# Patient Record
Sex: MALE | Race: WHITE | Employment: UNEMPLOYED | URBAN - METROPOLITAN AREA
[De-identification: names, ages, dates, MRNs, and addresses within clinical notes are randomized per-mention and may not be internally consistent; named-entity substitution may affect disease eponyms.]

---

## 2018-04-19 ENCOUNTER — HOSPITAL ENCOUNTER (OUTPATIENT)
Age: 53
Discharge: HOME | End: 2018-04-21
Payer: MEDICARE

## 2018-04-19 LAB
ALBUMIN SERPL-MCNC: 4.4 G/DL (ref 3.5–5.2)
ALP BLD-CCNC: 88 U/L (ref 40–129)
ALT SERPL-CCNC: 15 U/L (ref 0–40)
ANION GAP SERPL CALCULATED.3IONS-SCNC: 17 MMOL/L (ref 7–16)
AST SERPL-CCNC: 14 U/L (ref 0–39)
BASOPHILS ABSOLUTE: 0.11 E9/L (ref 0–0.2)
BASOPHILS RELATIVE PERCENT: 1.1 % (ref 0–2)
BILIRUB SERPL-MCNC: 1.2 MG/DL (ref 0–1.2)
BUN BLDV-MCNC: 15 MG/DL (ref 6–20)
CALCIUM SERPL-MCNC: 9.6 MG/DL (ref 8.6–10.2)
CHLORIDE BLD-SCNC: 98 MMOL/L (ref 98–107)
CHOLESTEROL, TOTAL: 143 MG/DL (ref 0–199)
CO2: 24 MMOL/L (ref 22–29)
CREAT SERPL-MCNC: 1.1 MG/DL (ref 0.7–1.2)
EOSINOPHILS ABSOLUTE: 0.45 E9/L (ref 0.05–0.5)
EOSINOPHILS RELATIVE PERCENT: 4.4 % (ref 0–6)
GFR AFRICAN AMERICAN: >60
GFR NON-AFRICAN AMERICAN: >60 ML/MIN/1.73
GLUCOSE BLD-MCNC: 102 MG/DL (ref 74–109)
HCT VFR BLD CALC: 47.9 % (ref 37–54)
HDLC SERPL-MCNC: 30 MG/DL
HEMOGLOBIN: 15.7 G/DL (ref 12.5–16.5)
IMMATURE GRANULOCYTES #: 0.02 E9/L
IMMATURE GRANULOCYTES %: 0.2 % (ref 0–5)
LDL CHOLESTEROL CALCULATED: 83 MG/DL (ref 0–99)
LYMPHOCYTES ABSOLUTE: 1.79 E9/L (ref 1.5–4)
LYMPHOCYTES RELATIVE PERCENT: 17.7 % (ref 20–42)
MCH RBC QN AUTO: 31.3 PG (ref 26–35)
MCHC RBC AUTO-ENTMCNC: 32.8 % (ref 32–34.5)
MCV RBC AUTO: 95.6 FL (ref 80–99.9)
MONOCYTES ABSOLUTE: 0.78 E9/L (ref 0.1–0.95)
MONOCYTES RELATIVE PERCENT: 7.7 % (ref 2–12)
NEUTROPHILS ABSOLUTE: 6.99 E9/L (ref 1.8–7.3)
NEUTROPHILS RELATIVE PERCENT: 68.9 % (ref 43–80)
PDW BLD-RTO: 12.8 FL (ref 11.5–15)
PLATELET # BLD: 330 E9/L (ref 130–450)
PMV BLD AUTO: 10.3 FL (ref 7–12)
POTASSIUM SERPL-SCNC: 4 MMOL/L (ref 3.5–5)
PROSTATE SPECIFIC ANTIGEN: 0.17 NG/ML (ref 0–4)
RBC # BLD: 5.01 E12/L (ref 3.8–5.8)
SODIUM BLD-SCNC: 139 MMOL/L (ref 132–146)
TOTAL PROTEIN: 7.6 G/DL (ref 6.4–8.3)
TRIGL SERPL-MCNC: 148 MG/DL (ref 0–149)
TSH SERPL DL<=0.05 MIU/L-ACNC: 6.31 UIU/ML (ref 0.27–4.2)
VITAMIN D 25-HYDROXY: 40 NG/ML (ref 30–100)
VLDLC SERPL CALC-MCNC: 30 MG/DL
WBC # BLD: 10.1 E9/L (ref 4.5–11.5)

## 2018-04-19 PROCEDURE — 84443 ASSAY THYROID STIM HORMONE: CPT

## 2018-04-19 PROCEDURE — 82306 VITAMIN D 25 HYDROXY: CPT

## 2018-04-19 PROCEDURE — 80053 COMPREHEN METABOLIC PANEL: CPT

## 2018-04-19 PROCEDURE — 80061 LIPID PANEL: CPT

## 2018-04-19 PROCEDURE — G0103 PSA SCREENING: HCPCS

## 2018-04-19 PROCEDURE — 85025 COMPLETE CBC W/AUTO DIFF WBC: CPT

## 2018-07-18 ENCOUNTER — HOSPITAL ENCOUNTER (OUTPATIENT)
Age: 53
Discharge: HOME | End: 2018-07-20
Payer: MEDICARE

## 2018-07-18 LAB
ALBUMIN SERPL-MCNC: 4.1 G/DL (ref 3.5–5.2)
ALP BLD-CCNC: 101 U/L (ref 40–129)
ALT SERPL-CCNC: 16 U/L (ref 0–40)
ANION GAP SERPL CALCULATED.3IONS-SCNC: 18 MMOL/L (ref 7–16)
AST SERPL-CCNC: 13 U/L (ref 0–39)
BASOPHILS ABSOLUTE: 0.06 E9/L (ref 0–0.2)
BASOPHILS RELATIVE PERCENT: 0.7 % (ref 0–2)
BILIRUB SERPL-MCNC: 1.1 MG/DL (ref 0–1.2)
BUN BLDV-MCNC: 11 MG/DL (ref 6–20)
CALCIUM SERPL-MCNC: 9.6 MG/DL (ref 8.6–10.2)
CHLORIDE BLD-SCNC: 99 MMOL/L (ref 98–107)
CHOLESTEROL, TOTAL: 132 MG/DL (ref 0–199)
CO2: 22 MMOL/L (ref 22–29)
CREAT SERPL-MCNC: 0.9 MG/DL (ref 0.7–1.2)
EOSINOPHILS ABSOLUTE: 0.18 E9/L (ref 0.05–0.5)
EOSINOPHILS RELATIVE PERCENT: 2 % (ref 0–6)
GFR AFRICAN AMERICAN: >60
GFR NON-AFRICAN AMERICAN: >60 ML/MIN/1.73
GLUCOSE BLD-MCNC: 94 MG/DL (ref 74–109)
HCT VFR BLD CALC: 48.1 % (ref 37–54)
HDLC SERPL-MCNC: 29 MG/DL
HEMOGLOBIN: 15.8 G/DL (ref 12.5–16.5)
IMMATURE GRANULOCYTES #: 0.03 E9/L
IMMATURE GRANULOCYTES %: 0.3 % (ref 0–5)
LDL CHOLESTEROL CALCULATED: 78 MG/DL (ref 0–99)
LYMPHOCYTES ABSOLUTE: 1.08 E9/L (ref 1.5–4)
LYMPHOCYTES RELATIVE PERCENT: 12.1 % (ref 20–42)
MCH RBC QN AUTO: 31.3 PG (ref 26–35)
MCHC RBC AUTO-ENTMCNC: 32.8 % (ref 32–34.5)
MCV RBC AUTO: 95.4 FL (ref 80–99.9)
MONOCYTES ABSOLUTE: 0.55 E9/L (ref 0.1–0.95)
MONOCYTES RELATIVE PERCENT: 6.2 % (ref 2–12)
NEUTROPHILS ABSOLUTE: 6.99 E9/L (ref 1.8–7.3)
NEUTROPHILS RELATIVE PERCENT: 78.7 % (ref 43–80)
PDW BLD-RTO: 12.8 FL (ref 11.5–15)
PLATELET # BLD: 317 E9/L (ref 130–450)
PMV BLD AUTO: 10.1 FL (ref 7–12)
POTASSIUM SERPL-SCNC: 4.2 MMOL/L (ref 3.5–5)
RBC # BLD: 5.04 E12/L (ref 3.8–5.8)
SODIUM BLD-SCNC: 139 MMOL/L (ref 132–146)
TOTAL PROTEIN: 7.6 G/DL (ref 6.4–8.3)
TRIGL SERPL-MCNC: 126 MG/DL (ref 0–149)
TSH SERPL DL<=0.05 MIU/L-ACNC: 0.45 UIU/ML (ref 0.27–4.2)
VITAMIN D 25-HYDROXY: 37 NG/ML (ref 30–100)
VLDLC SERPL CALC-MCNC: 25 MG/DL
WBC # BLD: 8.9 E9/L (ref 4.5–11.5)

## 2018-07-18 PROCEDURE — 80061 LIPID PANEL: CPT

## 2018-07-18 PROCEDURE — 82306 VITAMIN D 25 HYDROXY: CPT

## 2018-07-18 PROCEDURE — 85025 COMPLETE CBC W/AUTO DIFF WBC: CPT

## 2018-07-18 PROCEDURE — 84443 ASSAY THYROID STIM HORMONE: CPT

## 2018-07-18 PROCEDURE — 80053 COMPREHEN METABOLIC PANEL: CPT

## 2018-11-14 ENCOUNTER — HOSPITAL ENCOUNTER (OUTPATIENT)
Age: 53
Discharge: HOME | End: 2018-11-16
Payer: MEDICARE

## 2018-11-14 LAB
ALBUMIN SERPL-MCNC: 4.3 G/DL (ref 3.5–5.2)
ALP BLD-CCNC: 97 U/L (ref 40–129)
ALT SERPL-CCNC: 18 U/L (ref 0–40)
ANION GAP SERPL CALCULATED.3IONS-SCNC: 20 MMOL/L (ref 7–16)
AST SERPL-CCNC: 15 U/L (ref 0–39)
BASOPHILS ABSOLUTE: 0.08 E9/L (ref 0–0.2)
BASOPHILS RELATIVE PERCENT: 1 % (ref 0–2)
BILIRUB SERPL-MCNC: 0.4 MG/DL (ref 0–1.2)
BUN BLDV-MCNC: 10 MG/DL (ref 6–20)
CALCIUM SERPL-MCNC: 9.6 MG/DL (ref 8.6–10.2)
CHLORIDE BLD-SCNC: 102 MMOL/L (ref 98–107)
CHOLESTEROL, TOTAL: 148 MG/DL (ref 0–199)
CO2: 21 MMOL/L (ref 22–29)
CREAT SERPL-MCNC: 1.1 MG/DL (ref 0.7–1.2)
EOSINOPHILS ABSOLUTE: 0.2 E9/L (ref 0.05–0.5)
EOSINOPHILS RELATIVE PERCENT: 2.5 % (ref 0–6)
GFR AFRICAN AMERICAN: >60
GFR NON-AFRICAN AMERICAN: >60 ML/MIN/1.73
GLUCOSE BLD-MCNC: 103 MG/DL (ref 74–99)
HBA1C MFR BLD: 5.5 % (ref 4–5.6)
HCT VFR BLD CALC: 51 % (ref 37–54)
HDLC SERPL-MCNC: 32 MG/DL
HEMOGLOBIN: 17.1 G/DL (ref 12.5–16.5)
IMMATURE GRANULOCYTES #: 0.03 E9/L
IMMATURE GRANULOCYTES %: 0.4 % (ref 0–5)
LDL CHOLESTEROL CALCULATED: 79 MG/DL (ref 0–99)
LYMPHOCYTES ABSOLUTE: 1.22 E9/L (ref 1.5–4)
LYMPHOCYTES RELATIVE PERCENT: 15.3 % (ref 20–42)
MCH RBC QN AUTO: 31.7 PG (ref 26–35)
MCHC RBC AUTO-ENTMCNC: 33.5 % (ref 32–34.5)
MCV RBC AUTO: 94.4 FL (ref 80–99.9)
MONOCYTES ABSOLUTE: 0.5 E9/L (ref 0.1–0.95)
MONOCYTES RELATIVE PERCENT: 6.3 % (ref 2–12)
NEUTROPHILS ABSOLUTE: 5.92 E9/L (ref 1.8–7.3)
NEUTROPHILS RELATIVE PERCENT: 74.5 % (ref 43–80)
PDW BLD-RTO: 13.1 FL (ref 11.5–15)
PLATELET # BLD: 325 E9/L (ref 130–450)
PMV BLD AUTO: 10.4 FL (ref 7–12)
POTASSIUM SERPL-SCNC: 4.1 MMOL/L (ref 3.5–5)
RBC # BLD: 5.4 E12/L (ref 3.8–5.8)
SODIUM BLD-SCNC: 143 MMOL/L (ref 132–146)
TOTAL PROTEIN: 7.6 G/DL (ref 6.4–8.3)
TRIGL SERPL-MCNC: 187 MG/DL (ref 0–149)
TSH SERPL DL<=0.05 MIU/L-ACNC: 0.79 UIU/ML (ref 0.27–4.2)
VITAMIN D 25-HYDROXY: 38 NG/ML (ref 30–100)
VLDLC SERPL CALC-MCNC: 37 MG/DL
WBC # BLD: 8 E9/L (ref 4.5–11.5)

## 2018-11-14 PROCEDURE — 85025 COMPLETE CBC W/AUTO DIFF WBC: CPT

## 2018-11-14 PROCEDURE — 82306 VITAMIN D 25 HYDROXY: CPT

## 2018-11-14 PROCEDURE — 80061 LIPID PANEL: CPT

## 2018-11-14 PROCEDURE — 84443 ASSAY THYROID STIM HORMONE: CPT

## 2018-11-14 PROCEDURE — 80053 COMPREHEN METABOLIC PANEL: CPT

## 2018-11-14 PROCEDURE — 83036 HEMOGLOBIN GLYCOSYLATED A1C: CPT

## 2018-12-10 ENCOUNTER — HOSPITAL ENCOUNTER (EMERGENCY)
Age: 53
Discharge: HOME | End: 2018-12-10
Attending: EMERGENCY MEDICINE
Payer: MEDICARE

## 2018-12-10 ENCOUNTER — APPOINTMENT (OUTPATIENT)
Dept: GENERAL RADIOLOGY | Age: 53
End: 2018-12-10
Payer: MEDICARE

## 2018-12-10 VITALS
BODY MASS INDEX: 44.1 KG/M2 | RESPIRATION RATE: 22 BRPM | WEIGHT: 315 LBS | HEART RATE: 99 BPM | DIASTOLIC BLOOD PRESSURE: 68 MMHG | HEIGHT: 71 IN | OXYGEN SATURATION: 98 % | SYSTOLIC BLOOD PRESSURE: 140 MMHG | TEMPERATURE: 98 F

## 2018-12-10 DIAGNOSIS — J11.1 INFLUENZA WITH RESPIRATORY MANIFESTATION OTHER THAN PNEUMONIA: Primary | ICD-10-CM

## 2018-12-10 DIAGNOSIS — R09.1 PLEURISY: ICD-10-CM

## 2018-12-10 LAB
ALBUMIN SERPL-MCNC: 4.3 G/DL (ref 3.5–5.2)
ALP BLD-CCNC: 92 U/L (ref 40–129)
ALT SERPL-CCNC: 18 U/L (ref 0–40)
ANION GAP SERPL CALCULATED.3IONS-SCNC: 15 MMOL/L (ref 7–16)
AST SERPL-CCNC: 14 U/L (ref 0–39)
BASOPHILS ABSOLUTE: 0.04 E9/L (ref 0–0.2)
BASOPHILS RELATIVE PERCENT: 0.4 % (ref 0–2)
BILIRUB SERPL-MCNC: 0.8 MG/DL (ref 0–1.2)
BUN BLDV-MCNC: 13 MG/DL (ref 6–20)
CALCIUM SERPL-MCNC: 9.2 MG/DL (ref 8.6–10.2)
CHLORIDE BLD-SCNC: 104 MMOL/L (ref 98–107)
CO2: 22 MMOL/L (ref 22–29)
CREAT SERPL-MCNC: 1.1 MG/DL (ref 0.7–1.2)
D DIMER: <200 NG/ML DDU
EKG ATRIAL RATE: 101 BPM
EKG P AXIS: 56 DEGREES
EKG P-R INTERVAL: 168 MS
EKG Q-T INTERVAL: 360 MS
EKG QRS DURATION: 100 MS
EKG QTC CALCULATION (BAZETT): 466 MS
EKG R AXIS: 52 DEGREES
EKG T AXIS: 50 DEGREES
EKG VENTRICULAR RATE: 101 BPM
EOSINOPHILS ABSOLUTE: 0.04 E9/L (ref 0.05–0.5)
EOSINOPHILS RELATIVE PERCENT: 0.4 % (ref 0–6)
GFR AFRICAN AMERICAN: >60
GFR NON-AFRICAN AMERICAN: >60 ML/MIN/1.73
GLUCOSE BLD-MCNC: 154 MG/DL (ref 74–99)
HCT VFR BLD CALC: 46.3 % (ref 37–54)
HEMOGLOBIN: 16 G/DL (ref 12.5–16.5)
IMMATURE GRANULOCYTES #: 0.04 E9/L
IMMATURE GRANULOCYTES %: 0.4 % (ref 0–5)
INFLUENZA A BY PCR: DETECTED
INFLUENZA B BY PCR: NOT DETECTED
LYMPHOCYTES ABSOLUTE: 0.24 E9/L (ref 1.5–4)
LYMPHOCYTES RELATIVE PERCENT: 2.2 % (ref 20–42)
MCH RBC QN AUTO: 31.6 PG (ref 26–35)
MCHC RBC AUTO-ENTMCNC: 34.6 % (ref 32–34.5)
MCV RBC AUTO: 91.5 FL (ref 80–99.9)
MONOCYTES ABSOLUTE: 0.41 E9/L (ref 0.1–0.95)
MONOCYTES RELATIVE PERCENT: 3.7 % (ref 2–12)
NEUTROPHILS ABSOLUTE: 10.36 E9/L (ref 1.8–7.3)
NEUTROPHILS RELATIVE PERCENT: 92.9 % (ref 43–80)
PDW BLD-RTO: 12.5 FL (ref 11.5–15)
PLATELET # BLD: 275 E9/L (ref 130–450)
PMV BLD AUTO: 9.6 FL (ref 7–12)
POTASSIUM REFLEX MAGNESIUM: 3.6 MMOL/L (ref 3.5–5)
RBC # BLD: 5.06 E12/L (ref 3.8–5.8)
SODIUM BLD-SCNC: 141 MMOL/L (ref 132–146)
TOTAL PROTEIN: 7.5 G/DL (ref 6.4–8.3)
TROPONIN: <0.01 NG/ML (ref 0–0.03)
WBC # BLD: 11.1 E9/L (ref 4.5–11.5)

## 2018-12-10 PROCEDURE — 84484 ASSAY OF TROPONIN QUANT: CPT

## 2018-12-10 PROCEDURE — 80053 COMPREHEN METABOLIC PANEL: CPT

## 2018-12-10 PROCEDURE — 71045 X-RAY EXAM CHEST 1 VIEW: CPT

## 2018-12-10 PROCEDURE — 6360000002 HC RX W HCPCS: Performed by: EMERGENCY MEDICINE

## 2018-12-10 PROCEDURE — 87502 INFLUENZA DNA AMP PROBE: CPT

## 2018-12-10 PROCEDURE — 93005 ELECTROCARDIOGRAM TRACING: CPT | Performed by: EMERGENCY MEDICINE

## 2018-12-10 PROCEDURE — 6370000000 HC RX 637 (ALT 250 FOR IP): Performed by: EMERGENCY MEDICINE

## 2018-12-10 PROCEDURE — 85025 COMPLETE CBC W/AUTO DIFF WBC: CPT

## 2018-12-10 PROCEDURE — 99285 EMERGENCY DEPT VISIT HI MDM: CPT

## 2018-12-10 PROCEDURE — 85378 FIBRIN DEGRADE SEMIQUANT: CPT

## 2018-12-10 PROCEDURE — 96375 TX/PRO/DX INJ NEW DRUG ADDON: CPT

## 2018-12-10 PROCEDURE — 36415 COLL VENOUS BLD VENIPUNCTURE: CPT

## 2018-12-10 PROCEDURE — 96374 THER/PROPH/DIAG INJ IV PUSH: CPT

## 2018-12-10 RX ORDER — IPRATROPIUM BROMIDE AND ALBUTEROL SULFATE 2.5; .5 MG/3ML; MG/3ML
1 SOLUTION RESPIRATORY (INHALATION)
Status: DISCONTINUED | OUTPATIENT
Start: 2018-12-10 | End: 2018-12-10 | Stop reason: HOSPADM

## 2018-12-10 RX ORDER — METHYLPREDNISOLONE SODIUM SUCCINATE 125 MG/2ML
125 INJECTION, POWDER, LYOPHILIZED, FOR SOLUTION INTRAMUSCULAR; INTRAVENOUS ONCE
Status: COMPLETED | OUTPATIENT
Start: 2018-12-10 | End: 2018-12-10

## 2018-12-10 RX ORDER — OSELTAMIVIR PHOSPHATE 75 MG/1
75 CAPSULE ORAL 2 TIMES DAILY
Qty: 10 CAPSULE | Refills: 0 | Status: SHIPPED | OUTPATIENT
Start: 2018-12-10 | End: 2018-12-15

## 2018-12-10 RX ORDER — OSELTAMIVIR PHOSPHATE 75 MG/1
75 CAPSULE ORAL ONCE
Status: DISCONTINUED | OUTPATIENT
Start: 2018-12-10 | End: 2018-12-10 | Stop reason: HOSPADM

## 2018-12-10 RX ORDER — KETOROLAC TROMETHAMINE 30 MG/ML
30 INJECTION, SOLUTION INTRAMUSCULAR; INTRAVENOUS ONCE
Status: COMPLETED | OUTPATIENT
Start: 2018-12-10 | End: 2018-12-10

## 2018-12-10 RX ADMIN — METHYLPREDNISOLONE SODIUM SUCCINATE 125 MG: 125 INJECTION, POWDER, FOR SOLUTION INTRAMUSCULAR; INTRAVENOUS at 08:11

## 2018-12-10 RX ADMIN — KETOROLAC TROMETHAMINE 30 MG: 30 INJECTION, SOLUTION INTRAMUSCULAR at 07:40

## 2018-12-10 RX ADMIN — IPRATROPIUM BROMIDE AND ALBUTEROL SULFATE 1 AMPULE: .5; 3 SOLUTION RESPIRATORY (INHALATION) at 07:01

## 2018-12-10 ASSESSMENT — PAIN SCALES - GENERAL
PAINLEVEL_OUTOF10: 10
PAINLEVEL_OUTOF10: 7

## 2018-12-10 ASSESSMENT — PAIN DESCRIPTION - ORIENTATION: ORIENTATION: RIGHT

## 2018-12-10 ASSESSMENT — PAIN DESCRIPTION - LOCATION: LOCATION: CHEST

## 2018-12-10 ASSESSMENT — PAIN DESCRIPTION - FREQUENCY: FREQUENCY: CONTINUOUS

## 2018-12-10 ASSESSMENT — PAIN DESCRIPTION - PAIN TYPE: TYPE: ACUTE PAIN

## 2018-12-10 ASSESSMENT — PAIN DESCRIPTION - DESCRIPTORS: DESCRIPTORS: SHARP

## 2018-12-10 NOTE — ED PROVIDER NOTES
EXAM--------------------------------------    Constitutional:  Well developed, well nourished, no acute distress, non-toxic appearance   Eyes:  PERRL, conjunctiva normal, EOMI  HENT:  Atraumatic, external ears normal, nose normal, oropharynx moist. Neck- normal range of motion, no tenderness, supple   Respiratory:  No respiratory distress, decreased breath sounds, no rales, scattered wheezing   Cardiovascular:  Normal rate, normal rhythm, no murmurs, no gallops, no rubs. Radial and DP pulses 2+ bilaterally. GI:  Soft, nondistended, normal bowel sounds, nontender, no organomegaly, no mass, no rebound, no guarding   :  No costovertebral angle tenderness   Musculoskeletal:  No edema, no tenderness, no deformities. Back- no tenderness  Integument:  Well hydrated, no rash. Adequate perfusion. Lymphatic:  No lymphadenopathy noted   Neurologic:  Alert & oriented x 3, CN 2-12 normal, normal motor function, normal sensory function, no focal deficits noted. Psychiatric:  Speech and behavior appropriate       -------------------------------------------------- RESULTS -------------------------------------------------  I have personally reviewed all laboratory and imaging results for this patient. Results are listed below.      LABS:  Results for orders placed or performed during the hospital encounter of 12/10/18   Rapid influenza A/B antigens   Result Value Ref Range    Influenza A by PCR DETECTED (A) Not Detected    Influenza B by PCR Not Detected Not Detected   CBC Auto Differential   Result Value Ref Range    WBC 11.1 4.5 - 11.5 E9/L    RBC 5.06 3.80 - 5.80 E12/L    Hemoglobin 16.0 12.5 - 16.5 g/dL    Hematocrit 46.3 37.0 - 54.0 %    MCV 91.5 80.0 - 99.9 fL    MCH 31.6 26.0 - 35.0 pg    MCHC 34.6 (H) 32.0 - 34.5 %    RDW 12.5 11.5 - 15.0 fL    Platelets 811 485 - 651 E9/L    MPV 9.6 7.0 - 12.0 fL   Comprehensive Metabolic Panel w/ Reflex to MG   Result Value Ref Range    Sodium 141 132 - 146 mmol/L    Potassium

## 2018-12-16 ENCOUNTER — HOSPITAL ENCOUNTER (EMERGENCY)
Age: 53
Discharge: HOME | End: 2018-12-16
Attending: EMERGENCY MEDICINE
Payer: MEDICARE

## 2018-12-16 ENCOUNTER — APPOINTMENT (OUTPATIENT)
Dept: GENERAL RADIOLOGY | Age: 53
End: 2018-12-16
Payer: MEDICARE

## 2018-12-16 VITALS
OXYGEN SATURATION: 95 % | RESPIRATION RATE: 18 BRPM | HEIGHT: 71 IN | TEMPERATURE: 98.1 F | WEIGHT: 315 LBS | DIASTOLIC BLOOD PRESSURE: 75 MMHG | HEART RATE: 68 BPM | BODY MASS INDEX: 44.1 KG/M2 | SYSTOLIC BLOOD PRESSURE: 160 MMHG

## 2018-12-16 DIAGNOSIS — J45.20 MILD INTERMITTENT ASTHMATIC BRONCHITIS WITHOUT COMPLICATION: Primary | ICD-10-CM

## 2018-12-16 PROCEDURE — 71046 X-RAY EXAM CHEST 2 VIEWS: CPT

## 2018-12-16 PROCEDURE — 6370000000 HC RX 637 (ALT 250 FOR IP): Performed by: EMERGENCY MEDICINE

## 2018-12-16 PROCEDURE — 93005 ELECTROCARDIOGRAM TRACING: CPT

## 2018-12-16 PROCEDURE — 99285 EMERGENCY DEPT VISIT HI MDM: CPT

## 2018-12-16 RX ORDER — PREDNISONE 10 MG/1
TABLET ORAL
Qty: 10 TABLET | Refills: 0 | Status: SHIPPED | OUTPATIENT
Start: 2018-12-16 | End: 2018-12-26

## 2018-12-16 RX ORDER — PREDNISONE 20 MG/1
60 TABLET ORAL ONCE
Status: COMPLETED | OUTPATIENT
Start: 2018-12-16 | End: 2018-12-16

## 2018-12-16 RX ORDER — IPRATROPIUM BROMIDE AND ALBUTEROL SULFATE 2.5; .5 MG/3ML; MG/3ML
1 SOLUTION RESPIRATORY (INHALATION) ONCE
Status: COMPLETED | OUTPATIENT
Start: 2018-12-16 | End: 2018-12-16

## 2018-12-16 RX ORDER — AZITHROMYCIN 250 MG/1
TABLET, FILM COATED ORAL
Qty: 1 PACKET | Refills: 0 | Status: SHIPPED | OUTPATIENT
Start: 2018-12-16 | End: 2018-12-26

## 2018-12-16 RX ADMIN — IPRATROPIUM BROMIDE AND ALBUTEROL SULFATE 1 AMPULE: .5; 3 SOLUTION RESPIRATORY (INHALATION) at 15:59

## 2018-12-16 RX ADMIN — PREDNISONE 60 MG: 20 TABLET ORAL at 15:58

## 2018-12-16 ASSESSMENT — PAIN DESCRIPTION - PROGRESSION: CLINICAL_PROGRESSION: GRADUALLY WORSENING

## 2018-12-16 ASSESSMENT — PAIN DESCRIPTION - ORIENTATION: ORIENTATION: RIGHT

## 2018-12-16 ASSESSMENT — PAIN DESCRIPTION - PAIN TYPE: TYPE: ACUTE PAIN

## 2018-12-16 ASSESSMENT — PAIN SCALES - GENERAL: PAINLEVEL_OUTOF10: 6

## 2018-12-16 ASSESSMENT — PAIN DESCRIPTION - DESCRIPTORS: DESCRIPTORS: DULL

## 2018-12-16 ASSESSMENT — PAIN DESCRIPTION - LOCATION: LOCATION: CHEST

## 2018-12-16 NOTE — ED PROVIDER NOTES
HPI:  12/16/18,   Time: 3:47 PM         Mandi Espitia is a 47 y.o. male presenting to the ED for shortness of breath and coughing, beginning one week ago. The complaint has been persistent, moderate in severity, and worsened by light exertion. The patient has been having left-sided chest pain for the last week and has since developed a moist productive cough and shortness of breath with wheezing. The patient is a smoker and he has been using a nebulizer at home. He denies fever or chills or further chest pain    ROS:   Pertinent positives and negatives are stated within HPI, all other systems reviewed and are negative.  --------------------------------------------- PAST HISTORY ---------------------------------------------  Past Medical History:  has a past medical history of Hyperlipidemia; Hypertension; MS (multiple sclerosis) ([facility] Multiple sclerosis ([facility] Spinal stenosis, lumbar; and Thyroid disease. Past Surgical History:  has a past surgical history that includes hernia repair and Tonsillectomy. Social History:  reports that he has been smoking. He has been smoking about 1.00 pack per day. He has never used smokeless tobacco. He reports that he drinks alcohol. He reports that he does not use drugs. Family History: family history is not on file. The patients home medications have been reviewed. Allergies: Bactrim [sulfamethoxazole-trimethoprim]; Penicillins; and Sulfa antibiotics    -------------------------------------------------- RESULTS -------------------------------------------------  All laboratory and radiology results have been personally reviewed by myself   LABS:  No results found for this visit on 12/16/18. RADIOLOGY:  Interpreted by Radiologist.  XR CHEST STANDARD (2 VW)    (Results Pending)       ------------------------- NURSING NOTES AND VITALS REVIEWED ---------------------------   The nursing notes within the ED encounter and vital signs as below have been reviewed.

## 2018-12-16 NOTE — ED NOTES
Pt co right sided chest pain & SOB.  Last seen here 1 week ago & dx with pleurisy     Yamilet Dexter RN  12/16/18 [id number]

## 2018-12-19 LAB
EKG ATRIAL RATE: 71 BPM
EKG P AXIS: 35 DEGREES
EKG P-R INTERVAL: 150 MS
EKG Q-T INTERVAL: 388 MS
EKG QRS DURATION: 100 MS
EKG QTC CALCULATION (BAZETT): 421 MS
EKG R AXIS: 48 DEGREES
EKG T AXIS: 39 DEGREES
EKG VENTRICULAR RATE: 71 BPM

## 2019-02-07 ENCOUNTER — HOSPITAL ENCOUNTER (OUTPATIENT)
Age: 54
Discharge: HOME | End: 2019-02-09
Payer: MEDICARE

## 2019-02-07 LAB
ALBUMIN SERPL-MCNC: 4.4 G/DL (ref 3.5–5.2)
ALP BLD-CCNC: 83 U/L (ref 40–129)
ALT SERPL-CCNC: 15 U/L (ref 0–40)
ANION GAP SERPL CALCULATED.3IONS-SCNC: 14 MMOL/L (ref 7–16)
AST SERPL-CCNC: 13 U/L (ref 0–39)
BASOPHILS ABSOLUTE: 0.08 E9/L (ref 0–0.2)
BASOPHILS RELATIVE PERCENT: 0.9 % (ref 0–2)
BILIRUB SERPL-MCNC: 0.7 MG/DL (ref 0–1.2)
BUN BLDV-MCNC: 15 MG/DL (ref 6–20)
CALCIUM SERPL-MCNC: 9.7 MG/DL (ref 8.6–10.2)
CHLORIDE BLD-SCNC: 104 MMOL/L (ref 98–107)
CHOLESTEROL, TOTAL: 147 MG/DL (ref 0–199)
CO2: 22 MMOL/L (ref 22–29)
CREAT SERPL-MCNC: 1.1 MG/DL (ref 0.7–1.2)
EOSINOPHILS ABSOLUTE: 0.11 E9/L (ref 0.05–0.5)
EOSINOPHILS RELATIVE PERCENT: 1.2 % (ref 0–6)
GFR AFRICAN AMERICAN: >60
GFR NON-AFRICAN AMERICAN: >60 ML/MIN/1.73
GLUCOSE BLD-MCNC: 111 MG/DL (ref 74–99)
HCT VFR BLD CALC: 44.2 % (ref 37–54)
HDLC SERPL-MCNC: 35 MG/DL
HEMOGLOBIN: 14.4 G/DL (ref 12.5–16.5)
IMMATURE GRANULOCYTES #: 0.02 E9/L
IMMATURE GRANULOCYTES %: 0.2 % (ref 0–5)
LDL CHOLESTEROL CALCULATED: 78 MG/DL (ref 0–99)
LYMPHOCYTES ABSOLUTE: 1.03 E9/L (ref 1.5–4)
LYMPHOCYTES RELATIVE PERCENT: 11.1 % (ref 20–42)
MCH RBC QN AUTO: 31.6 PG (ref 26–35)
MCHC RBC AUTO-ENTMCNC: 32.6 % (ref 32–34.5)
MCV RBC AUTO: 97.1 FL (ref 80–99.9)
MONOCYTES ABSOLUTE: 0.66 E9/L (ref 0.1–0.95)
MONOCYTES RELATIVE PERCENT: 7.1 % (ref 2–12)
NEUTROPHILS ABSOLUTE: 7.37 E9/L (ref 1.8–7.3)
NEUTROPHILS RELATIVE PERCENT: 79.5 % (ref 43–80)
PDW BLD-RTO: 14.6 FL (ref 11.5–15)
PLATELET # BLD: 325 E9/L (ref 130–450)
PMV BLD AUTO: 10 FL (ref 7–12)
POTASSIUM SERPL-SCNC: 4.3 MMOL/L (ref 3.5–5)
PROSTATE SPECIFIC ANTIGEN: 0.22 NG/ML (ref 0–4)
RBC # BLD: 4.55 E12/L (ref 3.8–5.8)
SODIUM BLD-SCNC: 140 MMOL/L (ref 132–146)
TOTAL PROTEIN: 7.5 G/DL (ref 6.4–8.3)
TRIGL SERPL-MCNC: 170 MG/DL (ref 0–149)
TSH SERPL DL<=0.05 MIU/L-ACNC: 15.15 UIU/ML (ref 0.27–4.2)
VITAMIN D 25-HYDROXY: 35 NG/ML (ref 30–100)
VLDLC SERPL CALC-MCNC: 34 MG/DL
WBC # BLD: 9.3 E9/L (ref 4.5–11.5)

## 2019-02-07 PROCEDURE — 80061 LIPID PANEL: CPT

## 2019-02-07 PROCEDURE — 84443 ASSAY THYROID STIM HORMONE: CPT

## 2019-02-07 PROCEDURE — 85025 COMPLETE CBC W/AUTO DIFF WBC: CPT

## 2019-02-07 PROCEDURE — 82306 VITAMIN D 25 HYDROXY: CPT

## 2019-02-07 PROCEDURE — 80053 COMPREHEN METABOLIC PANEL: CPT

## 2019-02-07 PROCEDURE — G0103 PSA SCREENING: HCPCS

## 2019-04-04 ENCOUNTER — HOSPITAL ENCOUNTER (OUTPATIENT)
Age: 54
Discharge: HOME | End: 2019-04-06
Payer: MEDICARE

## 2019-04-04 LAB
ALBUMIN SERPL-MCNC: 4.3 G/DL (ref 3.5–5.2)
ALP BLD-CCNC: 92 U/L (ref 40–129)
ALT SERPL-CCNC: 14 U/L (ref 0–40)
ANION GAP SERPL CALCULATED.3IONS-SCNC: 19 MMOL/L (ref 7–16)
AST SERPL-CCNC: 13 U/L (ref 0–39)
BASOPHILS ABSOLUTE: 0.06 E9/L (ref 0–0.2)
BASOPHILS RELATIVE PERCENT: 0.7 % (ref 0–2)
BILIRUB SERPL-MCNC: 0.4 MG/DL (ref 0–1.2)
BUN BLDV-MCNC: 10 MG/DL (ref 6–20)
CALCIUM SERPL-MCNC: 9.6 MG/DL (ref 8.6–10.2)
CHLORIDE BLD-SCNC: 99 MMOL/L (ref 98–107)
CHOLESTEROL, TOTAL: 140 MG/DL (ref 0–199)
CO2: 22 MMOL/L (ref 22–29)
CREAT SERPL-MCNC: 0.9 MG/DL (ref 0.7–1.2)
EOSINOPHILS ABSOLUTE: 0.21 E9/L (ref 0.05–0.5)
EOSINOPHILS RELATIVE PERCENT: 2.6 % (ref 0–6)
GFR AFRICAN AMERICAN: >60
GFR NON-AFRICAN AMERICAN: >60 ML/MIN/1.73
GLUCOSE BLD-MCNC: 110 MG/DL (ref 74–99)
HCT VFR BLD CALC: 48.6 % (ref 37–54)
HDLC SERPL-MCNC: 32 MG/DL
HEMOGLOBIN: 16.2 G/DL (ref 12.5–16.5)
IMMATURE GRANULOCYTES #: 0.01 E9/L
IMMATURE GRANULOCYTES %: 0.1 % (ref 0–5)
LDL CHOLESTEROL CALCULATED: 56 MG/DL (ref 0–99)
LYMPHOCYTES ABSOLUTE: 1.09 E9/L (ref 1.5–4)
LYMPHOCYTES RELATIVE PERCENT: 13.4 % (ref 20–42)
MCH RBC QN AUTO: 32.1 PG (ref 26–35)
MCHC RBC AUTO-ENTMCNC: 33.3 % (ref 32–34.5)
MCV RBC AUTO: 96.2 FL (ref 80–99.9)
MONOCYTES ABSOLUTE: 0.36 E9/L (ref 0.1–0.95)
MONOCYTES RELATIVE PERCENT: 4.4 % (ref 2–12)
NEUTROPHILS ABSOLUTE: 6.43 E9/L (ref 1.8–7.3)
NEUTROPHILS RELATIVE PERCENT: 78.8 % (ref 43–80)
PDW BLD-RTO: 12 FL (ref 11.5–15)
PLATELET # BLD: 363 E9/L (ref 130–450)
PMV BLD AUTO: 10.3 FL (ref 7–12)
POTASSIUM SERPL-SCNC: 3.7 MMOL/L (ref 3.5–5)
PROSTATE SPECIFIC ANTIGEN: 0.11 NG/ML (ref 0–4)
RBC # BLD: 5.05 E12/L (ref 3.8–5.8)
SODIUM BLD-SCNC: 140 MMOL/L (ref 132–146)
TOTAL PROTEIN: 7.5 G/DL (ref 6.4–8.3)
TRIGL SERPL-MCNC: 261 MG/DL (ref 0–149)
TSH SERPL DL<=0.05 MIU/L-ACNC: 1.59 UIU/ML (ref 0.27–4.2)
VITAMIN D 25-HYDROXY: 33 NG/ML (ref 30–100)
VLDLC SERPL CALC-MCNC: 52 MG/DL
WBC # BLD: 8.2 E9/L (ref 4.5–11.5)

## 2019-04-04 PROCEDURE — 80061 LIPID PANEL: CPT

## 2019-04-04 PROCEDURE — 85025 COMPLETE CBC W/AUTO DIFF WBC: CPT

## 2019-04-04 PROCEDURE — 80053 COMPREHEN METABOLIC PANEL: CPT

## 2019-04-04 PROCEDURE — 84443 ASSAY THYROID STIM HORMONE: CPT

## 2019-04-04 PROCEDURE — 82306 VITAMIN D 25 HYDROXY: CPT

## 2019-04-04 PROCEDURE — G0103 PSA SCREENING: HCPCS

## 2019-07-23 ENCOUNTER — HOSPITAL ENCOUNTER (EMERGENCY)
Age: 54
Discharge: HOME | End: 2019-07-23
Payer: MEDICARE

## 2019-07-23 VITALS
OXYGEN SATURATION: 96 % | SYSTOLIC BLOOD PRESSURE: 124 MMHG | DIASTOLIC BLOOD PRESSURE: 88 MMHG | HEART RATE: 87 BPM | RESPIRATION RATE: 16 BRPM | BODY MASS INDEX: 44.1 KG/M2 | TEMPERATURE: 97.9 F | WEIGHT: 315 LBS | HEIGHT: 71 IN

## 2019-07-23 DIAGNOSIS — B02.9 HERPES ZOSTER WITHOUT COMPLICATION: Primary | ICD-10-CM

## 2019-07-23 PROCEDURE — 99282 EMERGENCY DEPT VISIT SF MDM: CPT

## 2019-07-23 RX ORDER — ACYCLOVIR 800 MG/1
800 TABLET ORAL
Qty: 35 TABLET | Refills: 0 | Status: SHIPPED | OUTPATIENT
Start: 2019-07-23 | End: 2019-08-02

## 2019-07-23 RX ORDER — METHYLPREDNISOLONE 4 MG/1
TABLET ORAL
Qty: 1 KIT | Refills: 0 | Status: SHIPPED | OUTPATIENT
Start: 2019-07-23 | End: 2019-07-29

## 2019-07-23 RX ORDER — OXYCODONE AND ACETAMINOPHEN 10; 325 MG/1; MG/1
1 TABLET ORAL EVERY 4 HOURS PRN
COMMUNITY

## 2019-07-23 ASSESSMENT — PAIN DESCRIPTION - ORIENTATION: ORIENTATION: RIGHT

## 2019-07-23 ASSESSMENT — PAIN DESCRIPTION - LOCATION: LOCATION: BACK;CHEST

## 2019-07-23 ASSESSMENT — PAIN SCALES - GENERAL: PAINLEVEL_OUTOF10: 4

## 2019-07-23 NOTE — ED PROVIDER NOTES
339 lb (153.8 kg)     Oxygen Saturation Interpretation: Normal.    Constitutional:  Alert, development consistent with age. HEENT:  NC/NT. Airway patent. Eyes:  PERRL, EOMI, no discharge. Ears:  TMs without perforation, injection, or bulging. External canals clear without exudate. Mouth:  Mucous membranes moist without lesions, tongue and gums normal.  Throat:  Pharynx without injection, exudate, or tonsillar hypertrophy. Airway patient. Neck:  Supple. No lymphadenopathy. Respiratory:  Clear to auscultation and breath sounds equal.  CV:  Regular rate and rhythm. GI:  Abdomen Soft, nontender, +BS. Integument:  Skin turgor: Normal.              Erythematous  Serous filled blistering rash to the dermatomal pathway to the right chest.  Neurological:  Orientation age-appropriate unless noted elseware. Motor functions intact. Lab / Imaging Results   (All laboratory and radiology results have been personally reviewed by myself)  Labs:  No results found for this visit on 07/23/19. Imaging: All Radiology results interpreted by Radiologist unless otherwise noted. No orders to display       ED Course / Medical Decision Making   Medications - No data to display     Consults:   None    Procedures:   none    MDM:   At this time the patient is without objective evidence of an acute process requiring hospitalization or inpatient management. They have remained hemodynamically stable throughout their entire ED visit and are stable for discharge with outpatient follow-up. The plan has been discussed in detail and they are aware of the specific conditions for emergent return, as well as the importance of follow-up. Patient educated on shingles and to follow up with his pcp. All questions answered. Counseling:     The emergency provider has spoken with the patient and discussed todays results, in addition to providing specific details for the plan of care and counseling regarding the diagnosis and

## 2019-07-31 ENCOUNTER — HOSPITAL ENCOUNTER (OUTPATIENT)
Age: 54
Discharge: HOME | End: 2019-08-02
Payer: MEDICARE

## 2019-07-31 LAB
ALBUMIN SERPL-MCNC: 4.1 G/DL (ref 3.5–5.2)
ALP BLD-CCNC: 88 U/L (ref 40–129)
ALT SERPL-CCNC: 44 U/L (ref 0–40)
ANION GAP SERPL CALCULATED.3IONS-SCNC: 22 MMOL/L (ref 7–16)
AST SERPL-CCNC: 21 U/L (ref 0–39)
BASOPHILS ABSOLUTE: 0.05 E9/L (ref 0–0.2)
BASOPHILS RELATIVE PERCENT: 0.3 % (ref 0–2)
BILIRUB SERPL-MCNC: 0.5 MG/DL (ref 0–1.2)
BUN BLDV-MCNC: 19 MG/DL (ref 6–20)
CALCIUM SERPL-MCNC: 9.3 MG/DL (ref 8.6–10.2)
CHLORIDE BLD-SCNC: 99 MMOL/L (ref 98–107)
CHOLESTEROL, TOTAL: 182 MG/DL (ref 0–199)
CO2: 21 MMOL/L (ref 22–29)
CREAT SERPL-MCNC: 1 MG/DL (ref 0.7–1.2)
EOSINOPHILS ABSOLUTE: 0.13 E9/L (ref 0.05–0.5)
EOSINOPHILS RELATIVE PERCENT: 0.9 % (ref 0–6)
GFR AFRICAN AMERICAN: >60
GFR NON-AFRICAN AMERICAN: >60 ML/MIN/1.73
GLUCOSE BLD-MCNC: 127 MG/DL (ref 74–99)
HCT VFR BLD CALC: 52.4 % (ref 37–54)
HDLC SERPL-MCNC: 41 MG/DL
HEMOGLOBIN: 17.4 G/DL (ref 12.5–16.5)
IMMATURE GRANULOCYTES #: 0.16 E9/L
IMMATURE GRANULOCYTES %: 1.1 % (ref 0–5)
LDL CHOLESTEROL CALCULATED: 70 MG/DL (ref 0–99)
LYMPHOCYTES ABSOLUTE: 1.96 E9/L (ref 1.5–4)
LYMPHOCYTES RELATIVE PERCENT: 13.4 % (ref 20–42)
MCH RBC QN AUTO: 31.9 PG (ref 26–35)
MCHC RBC AUTO-ENTMCNC: 33.2 % (ref 32–34.5)
MCV RBC AUTO: 96 FL (ref 80–99.9)
MONOCYTES ABSOLUTE: 0.63 E9/L (ref 0.1–0.95)
MONOCYTES RELATIVE PERCENT: 4.3 % (ref 2–12)
NEUTROPHILS ABSOLUTE: 11.69 E9/L (ref 1.8–7.3)
NEUTROPHILS RELATIVE PERCENT: 80 % (ref 43–80)
PDW BLD-RTO: 13.7 FL (ref 11.5–15)
PLATELET # BLD: 368 E9/L (ref 130–450)
PMV BLD AUTO: 10.4 FL (ref 7–12)
POTASSIUM SERPL-SCNC: 4.6 MMOL/L (ref 3.5–5)
RBC # BLD: 5.46 E12/L (ref 3.8–5.8)
SODIUM BLD-SCNC: 142 MMOL/L (ref 132–146)
TOTAL PROTEIN: 7.1 G/DL (ref 6.4–8.3)
TRIGL SERPL-MCNC: 357 MG/DL (ref 0–149)
TSH SERPL DL<=0.05 MIU/L-ACNC: 1.05 UIU/ML (ref 0.27–4.2)
VITAMIN D 25-HYDROXY: 38 NG/ML (ref 30–100)
VLDLC SERPL CALC-MCNC: 71 MG/DL
WBC # BLD: 14.6 E9/L (ref 4.5–11.5)

## 2019-07-31 PROCEDURE — 80061 LIPID PANEL: CPT

## 2019-07-31 PROCEDURE — 82306 VITAMIN D 25 HYDROXY: CPT

## 2019-07-31 PROCEDURE — 85025 COMPLETE CBC W/AUTO DIFF WBC: CPT

## 2019-07-31 PROCEDURE — 84443 ASSAY THYROID STIM HORMONE: CPT

## 2019-07-31 PROCEDURE — 80053 COMPREHEN METABOLIC PANEL: CPT

## 2023-01-11 ENCOUNTER — TELEPHONE (OUTPATIENT)
Dept: VASCULAR SURGERY | Age: 58
End: 2023-01-11

## 2023-01-12 ENCOUNTER — OFFICE VISIT (OUTPATIENT)
Dept: VASCULAR SURGERY | Age: 58
End: 2023-01-12
Payer: MEDICARE

## 2023-01-12 ENCOUNTER — TELEPHONE (OUTPATIENT)
Dept: VASCULAR SURGERY | Age: 58
End: 2023-01-12

## 2023-01-12 VITALS — HEIGHT: 70 IN | WEIGHT: 315 LBS | BODY MASS INDEX: 45.1 KG/M2

## 2023-01-12 DIAGNOSIS — I89.0 LYMPHEDEMA OF BOTH LOWER EXTREMITIES: ICD-10-CM

## 2023-01-12 DIAGNOSIS — B35.3 TINEA PEDIS OF BOTH FEET: ICD-10-CM

## 2023-01-12 DIAGNOSIS — B35.9 DERMATOPHYTOSIS: ICD-10-CM

## 2023-01-12 DIAGNOSIS — G35 MULTIPLE SCLEROSIS (HCC): ICD-10-CM

## 2023-01-12 DIAGNOSIS — M79.89 LEG SWELLING: ICD-10-CM

## 2023-01-12 PROCEDURE — 99204 OFFICE O/P NEW MOD 45 MIN: CPT | Performed by: SURGERY

## 2023-01-12 RX ORDER — PRAMIPEXOLE DIHYDROCHLORIDE 0.12 MG/1
0.12 TABLET ORAL 3 TIMES DAILY
COMMUNITY

## 2023-01-12 RX ORDER — LEVOTHYROXINE SODIUM 175 UG/1
175 TABLET ORAL DAILY
COMMUNITY

## 2023-01-12 RX ORDER — BUMETANIDE 1 MG/1
1 TABLET ORAL DAILY
COMMUNITY

## 2023-01-12 RX ORDER — TERBINAFINE HYDROCHLORIDE 250 MG/1
250 TABLET ORAL DAILY
Qty: 30 TABLET | Refills: 0 | Status: SHIPPED | OUTPATIENT
Start: 2023-01-12 | End: 2023-02-11

## 2023-01-12 RX ORDER — BACLOFEN 20 MG/1
20 TABLET ORAL 3 TIMES DAILY
COMMUNITY

## 2023-01-12 RX ORDER — SPIRONOLACTONE 25 MG/1
25 TABLET ORAL DAILY
COMMUNITY

## 2023-01-12 NOTE — TELEPHONE ENCOUNTER
Left message on voicemail regarding ultrasound at [address] [place] [address] Dr Figueroa on Monday, 1-23-23, at 3:00 pm. [place] at 2:30 pm.   Also, stop in lab the day of the ultrasound to have blood work (order in [address] [place] [address] Rd).

## 2023-01-12 NOTE — PROGRESS NOTES
Chief Complaint:   Chief Complaint   Patient presents with    Consultation     New pt. Bilateral lower extremities swelling         HPI: Patient came to the office, in a wheelchair accompanied by his mother and his cousin for the evaluation of vascular status of both legs, for the evaluation of persistent and progressive swelling of both legs on and off for last many years    Patient states that last year he did undergo venous ultrasound study and was told that there was no deep vein thrombosis    No history of deep vein thrombosis in the past    Patient has significant weakness of both legs due to combination of multiple sclerosis and spinal stenosis, mostly confined to bed to wheelchair and short distance of walking, spends most of the day with the legs in a dependent position    Patient follows up with the clinic clinic regarding his multiple sclerosis      Patient denies any focal lateralizing neurological symptoms like loss of speech, vision or loss of function of extremity      Allergies   Allergen Reactions    Bactrim [Sulfamethoxazole-Trimethoprim] Swelling     Swelling to face and eyes.     Penicillins Rash    Sulfa Antibiotics Rash       Current Outpatient Medications   Medication Sig Dispense Refill    bumetanide (BUMEX) 1 MG tablet Take 1 mg by mouth daily      levothyroxine (SYNTHROID) 175 MCG tablet Take 175 mcg by mouth Daily      spironolactone (ALDACTONE) 25 MG tablet Take 25 mg by mouth daily      pramipexole (MIRAPEX) 0.125 MG tablet Take 0.125 mg by mouth 3 times daily      baclofen (LIORESAL) 20 MG tablet Take 20 mg by mouth 3 times daily      metFORMIN (GLUCOPHAGE) 500 MG tablet Take 500 mg by mouth 2 times daily (with meals)      metoprolol tartrate (LOPRESSOR) 25 MG tablet Take 25 mg by mouth 2 times daily      terbinafine (LAMISIL) 250 MG tablet Take 1 tablet by mouth daily 30 tablet 0    miconazole nitrate 2 % OINT Apply topically 2 times daily Apply to the toes in between the toes both feet and both calfs up to the knee twice a day for 1 month 1 each 10    oxyCODONE-acetaminophen (PERCOCET)  MG per tablet Take 1 tablet by mouth every 4 hours as needed for Pain.      ipratropium (ATROVENT) 0.02 % nebulizer solution Take 2.5 mLs by nebulization 4 times daily 2.5 mL 3    Dimethyl Fumarate (TECFIDERA PO) Take  by mouth 2 times daily. gabapentin (NEURONTIN) 300 MG capsule Take 600 mg by mouth 2 times daily. vitamin E 1000 UNITS capsule Take 1,000 Units by mouth daily. potassium chloride (KLOR-CON) 10 MEQ CR tablet Take 10 mEq by mouth daily. furosemide (LASIX) 20 MG tablet Take 20 mg by mouth daily. Cholecalciferol (VITAMIN D3) 5000 UNITS CAPS Take  by mouth daily. loratadine (CLARITIN) 10 MG tablet Take 10 mg by mouth daily. albuterol (PROVENTIL HFA) 108 (90 BASE) MCG/ACT inhaler Inhale 2 puffs into the lungs every 6 hours as needed for Wheezing for 7 days. 1 Inhaler 0     No current facility-administered medications for this visit. Past Medical History:   Diagnosis Date    Dermatophytosis 1/12/2023    Hyperlipidemia     Hypertension     Leg pain     Leg swelling 1/12/2023    Lymphedema     Lymphedema of both lower extremities 1/12/2023    MS (multiple sclerosis[facility] ([facility])     [facility]     Spinal stenosis, lumbar     Thyroid disease     Tinea pedis of both feet 1/12/2023       Past Surgical History:   Procedure Laterality Date    HERNIA REPAIR      TONSILLECTOMY         No family history on file.     Social History     Socioeconomic History    Marital status:      Spouse name: Not on file    Number of children: Not on file    Years of education: Not on file    Highest education level: Not on file   Occupational History    Not on file   Tobacco Use    Smoking status: Every Day     Packs/day: 1.00     Types: Cigarettes    Smokeless tobacco: Never   Substance and Sexual Activity    Alcohol use: Yes     Comment: rarely    Drug use: Not Currently     Types: Marijuana Anne Fernandez)     Comment: edible    Sexual activity: Not on file   Other Topics Concern    Not on file   Social History Narrative    ** Merged History Encounter **          Social Determinants of Health     Financial Resource Strain: Not on file   Food Insecurity: Not on file   Transportation Needs: Not on file   Physical Activity: Not on file   Stress: Not on file   Social Connections: Not on file   Intimate Partner Violence: Not on file   Housing Stability: Not on file       Review of Systems:  Skin:  No abnormal pigmentation or rash  Eyes:  No blurring, diplopia or vision loss  Ears/Nose/Throat:  No hearing loss or vertigo  Respiratory:  No cough, pleuritic chest pain, dyspnea, or wheezing. History of asthma and chronic obstructive lung disease  Cardiovascular: No angina, palpitations . Hypertension  Gastrointestinal:  No nausea or vomiting; no abdominal pain or rectal bleeding  Musculoskeletal:  No arthritis or weakness. Neurologic:  No paralysis, paresis, paresthesia, seizures or headaches. History of weakness particularly in the legs due to multiple sclerosis and spinal stenosis  Hematologic/Lymphatic/Immunologic:  No anemia, abnormal bleeding/bruising, fever, chills or night sweats. Endocrine:  No heat or cold intolerance. No polyphagia, polydipsia or polyuria. Diabetes mellitus, hypothyroidism      Physical Exam:  General appearance:  Alert, awake, oriented x 3. No distress. Skin:  Warm and dry  Head:  Normocephalic. No masses, lesions or tenderness  Eyes:  Conjunctivae appear normal; PERRL  Ears:  External ears normal  Nose/Sinuses:  Septum midline, mucosa normal; no drainage  Oropharynx:  Clear, no exudate noted  Neck:  No jugular venous distention, lymphadenopathy or thyromegaly. No evidence of carotid bruit  Lungs:  Clear to ausculation bilaterally. No rhonchi, crackles, wheezes  Heart:  Regular rate and rhythm. No rub or murmur  Abdomen:  Soft, non-tender.   No masses, organomegaly. Musculoskeletal : No joint effusions, tenderness swelling    Neuro: Speech is intact. Moving all extremities. No focal motor or sensory deficits. Patient has weakness in both lower extremities mainly because of multiple sclerosis and spinal stenosis      Extremities:  Both feet are warm to touch. The color of both feet is normal.    Patient does have evidence of tinea pedis and dermatophytosis of the feet and the legs    Patient has a skin swelling of both legs right more than left side, clinically consistent with lymphedema    No calf tenderness    Surprisingly in the pain and swelling the pulses are easy to feel    No varicose veins noted    Pulses Right  Left    Brachial 3 3    Radial    3=normal   Femoral 2 2  2=diminished   Popliteal    1=barely palpable   Dorsalis pedis 2-3 2-3  0=absent   Posterior tibial    4=aneurysmal             Other pertinent information:1. The past medical records were reviewed. Assessment:    1. Leg swelling    2. Lymphedema of both lower extremities    3. Tinea pedis of both feet    4.  Dermatophytosis              Plan:       I had a long and detailed discussed the patient and his mother, options, risks benefits and alternatives were explained, patient recommended venous ultrasound for evaluation deep vein system, if normal, consider lymphedema therapy and once maximum improvement is noted, have the legs measured for compression device    Patient was instructed, to cut on the amount of time he sits in the chair with legs in a dependent position and keep the legs elevated to decrease the swelling    Patient also recommended baseline lab work with CBC and CMP    Inform them, that I will also contact the PCPs office regarding getting additional information including any further testing that was done in the past    The evidence to call me as needed if any increasing symptoms and also consider weight loss program   All the questions were answered. Orders Placed This Encounter   Procedures    US DUP LOWER EXTREMITIES BILATERAL VENOUS    Comprehensive Metabolic Panel    CBC     Orders Placed This Encounter   Medications    terbinafine (LAMISIL) 250 MG tablet     Sig: Take 1 tablet by mouth daily     Dispense:  30 tablet     Refill:  0    miconazole nitrate 2 % OINT     Sig: Apply topically 2 times daily Apply to the toes in between the toes both feet and both calfs up to the knee twice a day for 1 month     Dispense:  1 each     Refill:  10           Indicated follow-up: Return if symptoms worsen or fail to improve.

## 2023-01-23 ENCOUNTER — HOSPITAL ENCOUNTER (OUTPATIENT)
Dept: ULTRASOUND IMAGING | Age: 58
Discharge: HOME | End: 2023-01-25
Payer: MEDICARE

## 2023-01-23 DIAGNOSIS — M79.89 LEG SWELLING: ICD-10-CM

## 2023-01-23 PROCEDURE — 93970 EXTREMITY STUDY: CPT

## 2023-01-23 PROCEDURE — 93970 EXTREMITY STUDY: CPT | Performed by: RADIOLOGY

## 2023-01-24 ENCOUNTER — TELEPHONE (OUTPATIENT)
Dept: VASCULAR SURGERY | Age: 58
End: 2023-01-24

## 2023-01-24 DIAGNOSIS — I89.0 LYMPHEDEMA OF BOTH LOWER EXTREMITIES: Primary | ICD-10-CM

## 2023-01-24 NOTE — TELEPHONE ENCOUNTER
Message left #[phone] regarding the venous ultrasound results, normal, consider lymphedema therapy    Discussed the patient's mother Princess Li, telephone #[phone], venous ulcer normal, lab work CMP CBC within normal range, consider lymphedema therapy and once maximum improvement is noted, have the legs measured for compression device, all her questions were answered

## 2023-02-08 ENCOUNTER — APPOINTMENT (OUTPATIENT)
Dept: GENERAL RADIOLOGY | Age: 58
DRG: 208 | End: 2023-02-08
Payer: MEDICARE

## 2023-02-08 ENCOUNTER — APPOINTMENT (OUTPATIENT)
Dept: CT IMAGING | Age: 58
DRG: 208 | End: 2023-02-08
Payer: MEDICARE

## 2023-02-08 ENCOUNTER — HOSPITAL ENCOUNTER (INPATIENT)
Age: 58
LOS: 13 days | Discharge: NURSING, REHAB OR LONG-TERM CARE | DRG: 208 | End: 2023-02-21
Attending: EMERGENCY MEDICINE | Admitting: INTERNAL MEDICINE
Payer: MEDICARE

## 2023-02-08 DIAGNOSIS — G04.90 ENCEPHALITIS: Primary | ICD-10-CM

## 2023-02-08 PROBLEM — G93.40 ACUTE ENCEPHALOPATHY: Status: ACTIVE | Noted: 2023-02-08

## 2023-02-08 LAB
ACETAMINOPHEN LEVEL: <5 MCG/ML (ref 10–30)
ALBUMIN SERPL-MCNC: 4.3 G/DL (ref 3.5–5.2)
ALP BLD-CCNC: 115 U/L (ref 40–129)
ALT SERPL-CCNC: 12 U/L (ref 0–40)
AMMONIA: 29 UMOL/L (ref 16–60)
AMPHETAMINE SCREEN, URINE: NOT DETECTED
ANION GAP SERPL CALCULATED.3IONS-SCNC: 14 MMOL/L (ref 7–16)
AST SERPL-CCNC: 9 U/L (ref 0–39)
B.E.: 0.1 MMOL/L (ref -3–3)
B.E.: 2.2 MMOL/L (ref -3–3)
B.E.: 2.9 MMOL/L (ref -3–3)
BACTERIA: NORMAL /HPF
BARBITURATE SCREEN URINE: NOT DETECTED
BASOPHILS ABSOLUTE: 0.09 E9/L (ref 0–0.2)
BASOPHILS RELATIVE PERCENT: 0.6 % (ref 0–2)
BENZODIAZEPINE SCREEN, URINE: NOT DETECTED
BILIRUB SERPL-MCNC: 0.4 MG/DL (ref 0–1.2)
BILIRUBIN URINE: NEGATIVE
BLOOD, URINE: NEGATIVE
BUN BLDV-MCNC: 19 MG/DL (ref 6–20)
CALCIUM SERPL-MCNC: 9.4 MG/DL (ref 8.6–10.2)
CANNABINOID SCREEN URINE: NOT DETECTED
CHLORIDE BLD-SCNC: 102 MMOL/L (ref 98–107)
CLARITY: CLEAR
CO2: 27 MMOL/L (ref 22–29)
COCAINE METABOLITE SCREEN URINE: NOT DETECTED
COLOR: YELLOW
CREAT SERPL-MCNC: 1.2 MG/DL (ref 0.7–1.2)
DELIVERY SYSTEMS: ABNORMAL
DEVICE: ABNORMAL
EOSINOPHILS ABSOLUTE: 0.08 E9/L (ref 0.05–0.5)
EOSINOPHILS RELATIVE PERCENT: 0.6 % (ref 0–6)
ETHANOL: <10 MG/DL (ref 0–0.08)
FENTANYL SCREEN, URINE: NOT DETECTED
FIO2: 50
FIO2: 50
GFR SERPL CREATININE-BSD FRML MDRD: >60 ML/MIN/1.73
GLUCOSE BLD-MCNC: 173 MG/DL (ref 74–99)
GLUCOSE URINE: NEGATIVE MG/DL
HCO3: 27.1 MMOL/L (ref 22–26)
HCO3: 28.3 MMOL/L (ref 22–26)
HCO3: 29.6 MMOL/L (ref 22–26)
HCT VFR BLD CALC: 48.7 % (ref 37–54)
HEMOGLOBIN: 15.7 G/DL (ref 12.5–16.5)
IMMATURE GRANULOCYTES #: 0.07 E9/L
IMMATURE GRANULOCYTES %: 0.5 % (ref 0–5)
INFLUENZA A BY PCR: NOT DETECTED
INFLUENZA B BY PCR: NOT DETECTED
KETONES, URINE: NEGATIVE MG/DL
LACTIC ACID, SEPSIS: 1.7 MMOL/L (ref 0.5–1.9)
LACTIC ACID, SEPSIS: 1.7 MMOL/L (ref 0.5–1.9)
LEUKOCYTE ESTERASE, URINE: NEGATIVE
LYMPHOCYTES ABSOLUTE: 1.03 E9/L (ref 1.5–4)
LYMPHOCYTES RELATIVE PERCENT: 7.2 % (ref 20–42)
Lab: ABNORMAL
MCH RBC QN AUTO: 30.7 PG (ref 26–35)
MCHC RBC AUTO-ENTMCNC: 32.2 % (ref 32–34.5)
MCV RBC AUTO: 95.3 FL (ref 80–99.9)
METHADONE SCREEN, URINE: NOT DETECTED
MODE: AC
MODE: AC
MONOCYTES ABSOLUTE: 0.79 E9/L (ref 0.1–0.95)
MONOCYTES RELATIVE PERCENT: 5.6 % (ref 2–12)
NEUTROPHILS ABSOLUTE: 12.15 E9/L (ref 1.8–7.3)
NEUTROPHILS RELATIVE PERCENT: 85.5 % (ref 43–80)
NITRITE, URINE: NEGATIVE
O2 SATURATION: 86.7 % (ref 92–98.5)
O2 SATURATION: 96.7 % (ref 92–98.5)
O2 SATURATION: 96.9 % (ref 92–98.5)
OPERATOR ID: 30
OPERATOR ID: 30
OPERATOR ID: 3111
OPIATE SCREEN URINE: POSITIVE
OXYCODONE URINE: NOT DETECTED
PCO2 37: 45.1 MMHG (ref 35–45)
PCO2 37: 52.3 MMHG (ref 35–45)
PCO2 37: 55.9 MMHG (ref 35–45)
PDW BLD-RTO: 12.9 FL (ref 11.5–15)
PH 37: 7.32 (ref 7.35–7.45)
PH 37: 7.33 (ref 7.35–7.45)
PH 37: 7.41 (ref 7.35–7.45)
PH UA: 5.5 (ref 5–9)
PHENCYCLIDINE SCREEN URINE: NOT DETECTED
PLATELET # BLD: 401 E9/L (ref 130–450)
PMV BLD AUTO: 10.1 FL (ref 7–12)
PO2 37: 57.5 MMHG (ref 80–100)
PO2 37: 87.9 MMHG (ref 80–100)
PO2 37: 98.3 MMHG (ref 80–100)
POC SOURCE: ABNORMAL
POSITIVE END EXP PRESS: 5 CMH2O
POSITIVE END EXP PRESS: 5 CMH2O
POTASSIUM REFLEX MAGNESIUM: 4.1 MMOL/L (ref 3.5–5)
PROTEIN UA: NEGATIVE MG/DL
RBC # BLD: 5.11 E12/L (ref 3.8–5.8)
RBC UA: NORMAL /HPF (ref 0–2)
RESPIRATORY RATE: 18 B/MIN
RESPIRATORY RATE: 20 B/MIN
SALICYLATE, SERUM: <0.3 MG/DL (ref 0–30)
SARS-COV-2, NAAT: NOT DETECTED
SODIUM BLD-SCNC: 143 MMOL/L (ref 132–146)
SPECIFIC GRAVITY UA: 1.02 (ref 1–1.03)
TIDAL VOLUME: 400 ML
TIDAL VOLUME: 400 ML
TOTAL PROTEIN: 7.7 G/DL (ref 6.4–8.3)
TRICYCLIC ANTIDEPRESSANTS SCREEN SERUM: NEGATIVE NG/ML
TROPONIN, HIGH SENSITIVITY: 7 NG/L (ref 0–11)
TROPONIN, HIGH SENSITIVITY: 8 NG/L (ref 0–11)
TSH SERPL DL<=0.05 MIU/L-ACNC: 2.09 UIU/ML (ref 0.27–4.2)
UROBILINOGEN, URINE: 0.2 E.U./DL
WBC # BLD: 14.2 E9/L (ref 4.5–11.5)
WBC UA: NORMAL /HPF (ref 0–5)

## 2023-02-08 PROCEDURE — 74177 CT ABD & PELVIS W/CONTRAST: CPT

## 2023-02-08 PROCEDURE — 71045 X-RAY EXAM CHEST 1 VIEW: CPT

## 2023-02-08 PROCEDURE — 99285 EMERGENCY DEPT VISIT HI MDM: CPT

## 2023-02-08 PROCEDURE — 84484 ASSAY OF TROPONIN QUANT: CPT

## 2023-02-08 PROCEDURE — 2580000003 HC RX 258: Performed by: STUDENT IN AN ORGANIZED HEALTH CARE EDUCATION/TRAINING PROGRAM

## 2023-02-08 PROCEDURE — 5A1945Z RESPIRATORY VENTILATION, 24-96 CONSECUTIVE HOURS: ICD-10-PCS | Performed by: STUDENT IN AN ORGANIZED HEALTH CARE EDUCATION/TRAINING PROGRAM

## 2023-02-08 PROCEDURE — 6360000004 HC RX CONTRAST MEDICATION: Performed by: RADIOLOGY

## 2023-02-08 PROCEDURE — 85025 COMPLETE CBC W/AUTO DIFF WBC: CPT

## 2023-02-08 PROCEDURE — 81001 URINALYSIS AUTO W/SCOPE: CPT

## 2023-02-08 PROCEDURE — 2580000003 HC RX 258

## 2023-02-08 PROCEDURE — 80307 DRUG TEST PRSMV CHEM ANLYZR: CPT

## 2023-02-08 PROCEDURE — 6370000000 HC RX 637 (ALT 250 FOR IP): Performed by: STUDENT IN AN ORGANIZED HEALTH CARE EDUCATION/TRAINING PROGRAM

## 2023-02-08 PROCEDURE — 83605 ASSAY OF LACTIC ACID: CPT

## 2023-02-08 PROCEDURE — 80179 DRUG ASSAY SALICYLATE: CPT

## 2023-02-08 PROCEDURE — 96372 THER/PROPH/DIAG INJ SC/IM: CPT

## 2023-02-08 PROCEDURE — 2500000003 HC RX 250 WO HCPCS: Performed by: STUDENT IN AN ORGANIZED HEALTH CARE EDUCATION/TRAINING PROGRAM

## 2023-02-08 PROCEDURE — 87502 INFLUENZA DNA AMP PROBE: CPT

## 2023-02-08 PROCEDURE — 02HV33Z INSERTION OF INFUSION DEVICE INTO SUPERIOR VENA CAVA, PERCUTANEOUS APPROACH: ICD-10-PCS | Performed by: INTERNAL MEDICINE

## 2023-02-08 PROCEDURE — 84443 ASSAY THYROID STIM HORMONE: CPT

## 2023-02-08 PROCEDURE — 82803 BLOOD GASES ANY COMBINATION: CPT

## 2023-02-08 PROCEDURE — 96375 TX/PRO/DX INJ NEW DRUG ADDON: CPT

## 2023-02-08 PROCEDURE — 6360000002 HC RX W HCPCS: Performed by: EMERGENCY MEDICINE

## 2023-02-08 PROCEDURE — 87635 SARS-COV-2 COVID-19 AMP PRB: CPT

## 2023-02-08 PROCEDURE — 6360000002 HC RX W HCPCS: Performed by: STUDENT IN AN ORGANIZED HEALTH CARE EDUCATION/TRAINING PROGRAM

## 2023-02-08 PROCEDURE — 94640 AIRWAY INHALATION TREATMENT: CPT

## 2023-02-08 PROCEDURE — 93005 ELECTROCARDIOGRAM TRACING: CPT | Performed by: STUDENT IN AN ORGANIZED HEALTH CARE EDUCATION/TRAINING PROGRAM

## 2023-02-08 PROCEDURE — 0BH17EZ INSERTION OF ENDOTRACHEAL AIRWAY INTO TRACHEA, VIA NATURAL OR ARTIFICIAL OPENING: ICD-10-PCS | Performed by: STUDENT IN AN ORGANIZED HEALTH CARE EDUCATION/TRAINING PROGRAM

## 2023-02-08 PROCEDURE — 82140 ASSAY OF AMMONIA: CPT

## 2023-02-08 PROCEDURE — 2000000000 HC ICU R&B

## 2023-02-08 PROCEDURE — 94002 VENT MGMT INPAT INIT DAY: CPT

## 2023-02-08 PROCEDURE — 36415 COLL VENOUS BLD VENIPUNCTURE: CPT

## 2023-02-08 PROCEDURE — 36600 WITHDRAWAL OF ARTERIAL BLOOD: CPT

## 2023-02-08 PROCEDURE — 96368 THER/DIAG CONCURRENT INF: CPT

## 2023-02-08 PROCEDURE — 82077 ASSAY SPEC XCP UR&BREATH IA: CPT

## 2023-02-08 PROCEDURE — 70450 CT HEAD/BRAIN W/O DYE: CPT

## 2023-02-08 PROCEDURE — 74018 RADEX ABDOMEN 1 VIEW: CPT

## 2023-02-08 PROCEDURE — 71260 CT THORAX DX C+: CPT

## 2023-02-08 PROCEDURE — 96365 THER/PROPH/DIAG IV INF INIT: CPT

## 2023-02-08 PROCEDURE — 80143 DRUG ASSAY ACETAMINOPHEN: CPT

## 2023-02-08 PROCEDURE — 31500 INSERT EMERGENCY AIRWAY: CPT

## 2023-02-08 PROCEDURE — 96376 TX/PRO/DX INJ SAME DRUG ADON: CPT

## 2023-02-08 PROCEDURE — 87040 BLOOD CULTURE FOR BACTERIA: CPT

## 2023-02-08 PROCEDURE — 80053 COMPREHEN METABOLIC PANEL: CPT

## 2023-02-08 RX ORDER — LORAZEPAM 2 MG/ML
2 INJECTION INTRAMUSCULAR ONCE
Status: COMPLETED | OUTPATIENT
Start: 2023-02-08 | End: 2023-02-08

## 2023-02-08 RX ORDER — FENTANYL CITRATE 50 UG/ML
100 INJECTION, SOLUTION INTRAMUSCULAR; INTRAVENOUS ONCE
Status: COMPLETED | OUTPATIENT
Start: 2023-02-08 | End: 2023-02-08

## 2023-02-08 RX ORDER — PROPOFOL 10 MG/ML
5-50 INJECTION, EMULSION INTRAVENOUS CONTINUOUS
Status: DISCONTINUED | OUTPATIENT
Start: 2023-02-08 | End: 2023-02-09

## 2023-02-08 RX ORDER — 0.9 % SODIUM CHLORIDE 0.9 %
1000 INTRAVENOUS SOLUTION INTRAVENOUS ONCE
Status: COMPLETED | OUTPATIENT
Start: 2023-02-08 | End: 2023-02-08

## 2023-02-08 RX ORDER — IPRATROPIUM BROMIDE AND ALBUTEROL SULFATE 2.5; .5 MG/3ML; MG/3ML
1 SOLUTION RESPIRATORY (INHALATION) 4 TIMES DAILY
Status: DISCONTINUED | OUTPATIENT
Start: 2023-02-08 | End: 2023-02-09

## 2023-02-08 RX ORDER — SODIUM CHLORIDE 9 MG/ML
INJECTION, SOLUTION INTRAVENOUS PRN
Status: DISCONTINUED | OUTPATIENT
Start: 2023-02-08 | End: 2023-02-21 | Stop reason: HOSPADM

## 2023-02-08 RX ORDER — ROCURONIUM BROMIDE 10 MG/ML
150 INJECTION, SOLUTION INTRAVENOUS ONCE
Status: COMPLETED | OUTPATIENT
Start: 2023-02-08 | End: 2023-02-08

## 2023-02-08 RX ORDER — DEXAMETHASONE SODIUM PHOSPHATE 10 MG/ML
10 INJECTION INTRAMUSCULAR; INTRAVENOUS ONCE
Status: COMPLETED | OUTPATIENT
Start: 2023-02-08 | End: 2023-02-08

## 2023-02-08 RX ORDER — IPRATROPIUM BROMIDE AND ALBUTEROL SULFATE 2.5; .5 MG/3ML; MG/3ML
3 SOLUTION RESPIRATORY (INHALATION) ONCE
Status: COMPLETED | OUTPATIENT
Start: 2023-02-08 | End: 2023-02-08

## 2023-02-08 RX ORDER — HALOPERIDOL 5 MG/ML
5 INJECTION INTRAMUSCULAR ONCE
Status: COMPLETED | OUTPATIENT
Start: 2023-02-08 | End: 2023-02-08

## 2023-02-08 RX ORDER — BUDESONIDE 0.5 MG/2ML
0.5 INHALANT ORAL 2 TIMES DAILY
Status: DISCONTINUED | OUTPATIENT
Start: 2023-02-08 | End: 2023-02-21 | Stop reason: HOSPADM

## 2023-02-08 RX ORDER — KETAMINE HYDROCHLORIDE 10 MG/ML
0.5 INJECTION INTRAMUSCULAR; INTRAVENOUS ONCE
Status: COMPLETED | OUTPATIENT
Start: 2023-02-08 | End: 2023-02-08

## 2023-02-08 RX ORDER — SODIUM CHLORIDE, SODIUM LACTATE, POTASSIUM CHLORIDE, CALCIUM CHLORIDE 600; 310; 30; 20 MG/100ML; MG/100ML; MG/100ML; MG/100ML
INJECTION, SOLUTION INTRAVENOUS CONTINUOUS
Status: DISCONTINUED | OUTPATIENT
Start: 2023-02-08 | End: 2023-02-10

## 2023-02-08 RX ORDER — SODIUM CHLORIDE 0.9 % (FLUSH) 0.9 %
5-40 SYRINGE (ML) INJECTION EVERY 12 HOURS SCHEDULED
Status: DISCONTINUED | OUTPATIENT
Start: 2023-02-08 | End: 2023-02-21 | Stop reason: HOSPADM

## 2023-02-08 RX ORDER — SODIUM CHLORIDE 0.9 % (FLUSH) 0.9 %
5-40 SYRINGE (ML) INJECTION PRN
Status: DISCONTINUED | OUTPATIENT
Start: 2023-02-08 | End: 2023-02-21 | Stop reason: HOSPADM

## 2023-02-08 RX ORDER — SODIUM CHLORIDE 9 MG/ML
INJECTION, SOLUTION INTRAVENOUS ONCE
Status: COMPLETED | OUTPATIENT
Start: 2023-02-08 | End: 2023-02-08

## 2023-02-08 RX ORDER — KETAMINE HYDROCHLORIDE 10 MG/ML
400 INJECTION INTRAMUSCULAR; INTRAVENOUS ONCE
Status: DISCONTINUED | OUTPATIENT
Start: 2023-02-08 | End: 2023-02-08

## 2023-02-08 RX ORDER — ETOMIDATE 2 MG/ML
40 INJECTION INTRAVENOUS ONCE
Status: COMPLETED | OUTPATIENT
Start: 2023-02-08 | End: 2023-02-08

## 2023-02-08 RX ORDER — IPRATROPIUM BROMIDE AND ALBUTEROL SULFATE 2.5; .5 MG/3ML; MG/3ML
1 SOLUTION RESPIRATORY (INHALATION) ONCE
Status: DISCONTINUED | OUTPATIENT
Start: 2023-02-08 | End: 2023-02-08

## 2023-02-08 RX ADMIN — FENTANYL CITRATE 100 MCG: 50 INJECTION, SOLUTION INTRAMUSCULAR; INTRAVENOUS at 20:32

## 2023-02-08 RX ADMIN — PROPOFOL 50 MCG/KG/MIN: 10 INJECTION, EMULSION INTRAVENOUS at 23:47

## 2023-02-08 RX ADMIN — MEROPENEM 2000 MG: 1 INJECTION, POWDER, FOR SOLUTION INTRAVENOUS at 19:17

## 2023-02-08 RX ADMIN — SODIUM CHLORIDE: 9 INJECTION, SOLUTION INTRAVENOUS at 22:45

## 2023-02-08 RX ADMIN — ETOMIDATE 40 MG: 2 INJECTION INTRAVENOUS at 17:19

## 2023-02-08 RX ADMIN — VANCOMYCIN HYDROCHLORIDE 3000 MG: 1 INJECTION, POWDER, LYOPHILIZED, FOR SOLUTION INTRAVENOUS at 20:04

## 2023-02-08 RX ADMIN — IPRATROPIUM BROMIDE AND ALBUTEROL SULFATE 3 AMPULE: .5; 3 SOLUTION RESPIRATORY (INHALATION) at 18:09

## 2023-02-08 RX ADMIN — HALOPERIDOL LACTATE 5 MG: 5 INJECTION, SOLUTION INTRAMUSCULAR at 15:43

## 2023-02-08 RX ADMIN — FENTANYL CITRATE 100 MCG: 50 INJECTION, SOLUTION INTRAMUSCULAR; INTRAVENOUS at 19:42

## 2023-02-08 RX ADMIN — PROPOFOL 10 MCG/KG/MIN: 10 INJECTION, EMULSION INTRAVENOUS at 17:22

## 2023-02-08 RX ADMIN — DEXAMETHASONE SODIUM PHOSPHATE 10 MG: 10 INJECTION INTRAMUSCULAR; INTRAVENOUS at 19:14

## 2023-02-08 RX ADMIN — SODIUM CHLORIDE, POTASSIUM CHLORIDE, SODIUM LACTATE AND CALCIUM CHLORIDE: 600; 310; 30; 20 INJECTION, SOLUTION INTRAVENOUS at 22:32

## 2023-02-08 RX ADMIN — SODIUM CHLORIDE 1000 ML: 9 INJECTION, SOLUTION INTRAVENOUS at 19:24

## 2023-02-08 RX ADMIN — KETAMINE HYDROCHLORIDE 86.2 MG: 10 INJECTION INTRAMUSCULAR; INTRAVENOUS at 16:48

## 2023-02-08 RX ADMIN — SODIUM CHLORIDE 1000 ML: 9 INJECTION, SOLUTION INTRAVENOUS at 14:37

## 2023-02-08 RX ADMIN — IOPAMIDOL 75 ML: 755 INJECTION, SOLUTION INTRAVENOUS at 21:15

## 2023-02-08 RX ADMIN — ROCURONIUM BROMIDE 150 MG: 10 SOLUTION INTRAVENOUS at 17:20

## 2023-02-08 RX ADMIN — ACYCLOVIR SODIUM 750 MG: 50 INJECTION, SOLUTION INTRAVENOUS at 19:23

## 2023-02-08 RX ADMIN — FENTANYL CITRATE 50 MCG/HR: 50 INJECTION, SOLUTION INTRAMUSCULAR; INTRAVENOUS at 19:36

## 2023-02-08 RX ADMIN — LORAZEPAM 2 MG: 2 INJECTION INTRAMUSCULAR; INTRAVENOUS at 15:04

## 2023-02-08 ASSESSMENT — PULMONARY FUNCTION TESTS
PIF_VALUE: 24
PIF_VALUE: 42
PIF_VALUE: 39

## 2023-02-08 NOTE — ED NOTES
150mg of rocuronium given IV at this time.      Froilan Aguero, RN  02/08/23 6523 Joaquim Rhodes, GEOVANNY  02/08/23 [id number]

## 2023-02-08 NOTE — ED PROVIDER NOTES
Department of Emergency Medicine   ED  Provider Note  Admit Date/RoomTime: 2/8/2023  1:38 PM  ED Room: 08/08          History of Present Illness:  2/8/23, Time: 2:02 PM EST  Chief Complaint   Patient presents with    Altered Mental Status     Family states the patient is more altered than normal. Started yesterday. EMS did give Narcan and patient became more agitated. Nasreen Mann is a 65 y.o. male presenting to the ED for altered mental status. Patient does have a history of hypothyroidism on Synthroid, alcohol and drug abuse, and AMS. Patient reportedly was normal yesterday. Today patient was found sitting on the toilet and they believe that he was there for an extended period of time. He does have some abnormal movements of his upper extremities. Patient is oriented to person and month but not place nor time. He is unable to provide a history at this time. Review of Systems   Unable to perform ROS: Mental status change        --------------------------------------------- PAST HISTORY ---------------------------------------------  Past Medical History:  has a past medical history of Dermatophytosis, Hyperlipidemia, Hypertension, Leg pain, Leg swelling, Lymphedema, Lymphedema of both lower extremities, MS (multiple sclerosis) ([facility] [address] Multiple sclerosis ([facility] [address] Multiple sclerosis ([facility] [address] Spinal stenosis, lumbar, Thyroid disease, and Tinea pedis of both feet. Past Surgical History:  has a past surgical history that includes hernia repair and Tonsillectomy. Social History:  reports that he has been smoking cigarettes. He has been smoking an average of 1 pack per day. He has never used smokeless tobacco. He reports current alcohol use. He reports that he does not currently use drugs after having used the following drugs: Marijuana Juryarelis Hartley). Family History: family history is not on file. . Unless otherwise noted, family history is non contributory    The patients home medications have been reviewed. Allergies: Bactrim [sulfamethoxazole-trimethoprim], Penicillins, and Sulfa antibiotics        ---------------------------------------------------PHYSICAL EXAM--------------------------------------    Physical Exam  Vitals and nursing note reviewed. Constitutional:       General: He is not in acute distress. Appearance: He is well-developed. He is obese. HENT:      Head: Normocephalic and atraumatic. Eyes:      Conjunctiva/sclera: Conjunctivae normal.      Pupils: Pupils are equal, round, and reactive to light. Cardiovascular:      Rate and Rhythm: Normal rate and regular rhythm. Heart sounds: Normal heart sounds. No murmur heard. Pulmonary:      Effort: Pulmonary effort is normal. No respiratory distress. Breath sounds: Normal breath sounds. No wheezing or rales. Abdominal:      Palpations: Abdomen is soft. Tenderness: There is no abdominal tenderness. There is no guarding or rebound. Musculoskeletal:         General: No tenderness or deformity. Cervical back: Normal range of motion and neck supple. Skin:     General: Skin is warm and dry. Comments: Stage 1 pressure ulcer sacrum   Neurological:      Mental Status: He is lethargic, disoriented and confused. GCS: GCS eye subscore is 2. GCS verbal subscore is 4. GCS motor subscore is 6.          -------------------------------------------------- RESULTS -------------------------------------------------  I have personally reviewed all laboratory and imaging results for this patient. Results are listed below.      LABS: (Lab results interpreted by me)  Results for orders placed or performed during the hospital encounter of 02/08/23   COVID-19, Rapid    Specimen: Nasopharyngeal Swab   Result Value Ref Range    SARS-CoV-2, NAAT Not Detected Not Detected   RAPID INFLUENZA A/B ANTIGENS    Specimen: Nasopharyngeal   Result Value Ref Range    Influenza A by PCR Not Detected Not Detected    Influenza B by PCR Not Detected Not Detected   CBC with Auto Differential   Result Value Ref Range    WBC 14.2 (H) 4.5 - 11.5 E9/L    RBC 5.11 3.80 - 5.80 E12/L    Hemoglobin 15.7 12.5 - 16.5 g/dL    Hematocrit 48.7 37.0 - 54.0 %    MCV 95.3 80.0 - 99.9 fL    MCH 30.7 26.0 - 35.0 pg    MCHC 32.2 32.0 - 34.5 %    RDW 12.9 11.5 - 15.0 fL    Platelets 054 906 - 304 E9/L    MPV 10.1 7.0 - 12.0 fL    Neutrophils % 85.5 (H) 43.0 - 80.0 %    Immature Granulocytes % 0.5 0.0 - 5.0 %    Lymphocytes % 7.2 (L) 20.0 - 42.0 %    Monocytes % 5.6 2.0 - 12.0 %    Eosinophils % 0.6 0.0 - 6.0 %    Basophils % 0.6 0.0 - 2.0 %    Neutrophils Absolute 12.15 (H) 1.80 - 7.30 E9/L    Immature Granulocytes # 0.07 E9/L    Lymphocytes Absolute 1.03 (L) 1.50 - 4.00 E9/L    Monocytes Absolute 0.79 0.10 - 0.95 E9/L    Eosinophils Absolute 0.08 0.05 - 0.50 E9/L    Basophils Absolute 0.09 0.00 - 0.20 E9/L   Comprehensive Metabolic Panel w/ Reflex to MG   Result Value Ref Range    Sodium 143 132 - 146 mmol/L    Potassium reflex Magnesium 4.1 3.5 - 5.0 mmol/L    Chloride 102 98 - 107 mmol/L    CO2 27 22 - 29 mmol/L    Anion Gap 14 7 - 16 mmol/L    Glucose 173 (H) 74 - 99 mg/dL    BUN 19 6 - 20 mg/dL    Creatinine 1.2 0.7 - 1.2 mg/dL    Est, Glom Filt Rate >60 >=60 mL/min/1.73    Calcium 9.4 8.6 - 10.2 mg/dL    Total Protein 7.7 6.4 - 8.3 g/dL    Albumin 4.3 3.5 - 5.2 g/dL    Total Bilirubin 0.4 0.0 - 1.2 mg/dL    Alkaline Phosphatase 115 40 - 129 U/L    ALT 12 0 - 40 U/L    AST 9 0 - 39 U/L   Troponin   Result Value Ref Range    Troponin, High Sensitivity 7 0 - 11 ng/L   Ammonia   Result Value Ref Range    Ammonia 29.0 16.0 - 60.0 umol/L   Lactate, Sepsis   Result Value Ref Range    Lactic Acid, Sepsis 1.7 0.5 - 1.9 mmol/L   Lactate, Sepsis   Result Value Ref Range    Lactic Acid, Sepsis 1.7 0.5 - 1.9 mmol/L   TSH   Result Value Ref Range    TSH 2.090 0.270 - 4.200 uIU/mL   Urinalysis with Microscopic   Result Value Ref Range    Color, UA Yellow Straw/Yellow    Clarity, UA Clear Clear    Glucose, Ur Negative Negative mg/dL    Bilirubin Urine Negative Negative    Ketones, Urine Negative Negative mg/dL    Specific Gravity, UA 1.025 1.005 - 1.030    Blood, Urine Negative Negative    pH, UA 5.5 5.0 - 9.0    Protein, UA Negative Negative mg/dL    Urobilinogen, Urine 0.2 <2.0 E.U./dL    Nitrite, Urine Negative Negative    Leukocyte Esterase, Urine Negative Negative    WBC, UA NONE 0 - 5 /HPF    RBC, UA NONE 0 - 2 /HPF    Bacteria, UA NONE SEEN None Seen /HPF   URINE DRUG SCREEN   Result Value Ref Range    Amphetamine Screen, Urine NOT DETECTED Negative <1000 ng/mL    Barbiturate Screen, Ur NOT DETECTED Negative < 200 ng/mL    Benzodiazepine Screen, Urine NOT DETECTED Negative < 200 ng/mL    Cannabinoid Scrn, Ur NOT DETECTED Negative < 50ng/mL    Cocaine Metabolite Screen, Urine NOT DETECTED Negative < 300 ng/mL    Opiate Scrn, Ur POSITIVE (A) Negative < 300ng/mL    PCP Screen, Urine NOT DETECTED Negative < 25 ng/mL    Methadone Screen, Urine NOT DETECTED Negative <300 ng/mL    Oxycodone Urine NOT DETECTED Negative <100 ng/mL    FENTANYL SCREEN, URINE NOT DETECTED Negative <1 ng/mL    Drug Screen Comment: see below    Serum Drug Screen   Result Value Ref Range    Ethanol Lvl <10 mg/dL    Acetaminophen Level <5.0 (L) 10.0 - 07.4 mcg/mL    Salicylate, Serum <7.4 0.0 - 30.0 mg/dL    TCA Scrn NEGATIVE Cutoff:300 ng/mL   Arterial Blood Gas, Respiratory Only   Result Value Ref Range    POC Source Arterial     PH 37 7.406 7.350 - 7.450    PCO2 37 45.1 (H) 35.0 - 45.0 mmHg    PO2 37 87.9 80.0 - 100.0 mmHg    HCO3 28.3 (H) 22.0 - 26.0 mmol/L    B.E. 2.9 -3.0 - 3.0 mmol/L    O2 Sat 96.7 92.0 - 98.5 %     ID 3,111     DEVICE [id number]     [employer]    Troponin   Result Value Ref Range    Troponin, High Sensitivity 8 0 - 11 ng/L   Arterial Blood Gas, Respiratory Only   Result Value Ref Range    POC Source Arterial     FIO2 50.0     PH 37 7.333 (L) 7.350 - 7.450    PCO2 37 55.9 (H) 35.0 - 45.0 mmHg    PO2 37 98.3 80.0 - 100.0 mmHg    HCO3 29.6 (H) 22.0 - 26.0 mmol/L    B.E. 2.2 -3.0 - 3.0 mmol/L    O2 Sat 96.9 92.0 - 98.5 %     ID 30     DEVICE [id number]     Mode AC     Tidal Volume 400 mL    Positive End EXP Press 5 cmH2O    Respiratory Rate 18 b/min    [employer]    ,       RADIOLOGY:  Interpreted by Radiologist unless otherwise specified  XR ABDOMEN FOR NG/OG/NE TUBE PLACEMENT   Final Result   Nasogastric tube is poorly discerned on the abdominal films but CT chest   confirms that tip of this device terminates at the gastroesophageal junction. Preliminary report is provided via ancillary staff to a license caregiver on   02/08/2023 at 9:14 p.m. EST. XR CHEST PORTABLE   Final Result   1. Medical support devices as above. The enteric tube on this exam appears   to slightly retracted with distal tip projecting over the central mediastinum. (Suggest confirmation with dedicated radiographic evaluation of the lower   chest and upper abdomen and if confirmed, suggest repositioning.)      2. Atherosclerotic disease and prominence of the cardiac silhouette. There   are bilateral interstitial opacities which are unchanged. The findings were sent to the Radiology Results [address] at 6:55   pm on 2/8/2023 to be communicated to a licensed caregiver. XR CHEST PORTABLE   Final Result   ETT as noted above. CT HEAD WO CONTRAST   Final Result   No acute intracranial abnormality. XR CHEST PORTABLE   Final Result   No acute process. Mild cardiomegaly.          CT ABDOMEN PELVIS W IV CONTRAST Additional Contrast? None    (Results Pending)   CT CHEST W CONTRAST    (Results Pending)                ------------------------- NURSING NOTES AND VITALS REVIEWED ---------------------------   The nursing notes within the ED encounter and vital signs as below have been reviewed by myself  BP (!) 157/83   Pulse 70 Temp 98.4 °F (36.9 °C) (Axillary)   Resp 20   Wt (!) 380 lb (172.4 kg)   SpO2 100%   BMI 54.52 kg/m²     Oxygen Saturation Interpretation: Normal    The cardiac monitor revealed normal sinus rhythm with a heart rate in the 70s as interpreted by me. The cardiac monitor was ordered secondary to the patient's heart rate and to monitor the patient for dysrhythmia. CPT 89689    The patients available past medical records and past encounters were reviewed.         ------------------------------ ED COURSE/MEDICAL DECISION MAKING----------------------  Medications   propofol injection (50 mcg/kg/min × 172.4 kg IntraVENous Rate/Dose Change 2/8/23 1918)   vancomycin (VANCOCIN) 3,000 mg in sodium chloride 0.9 % 500 mL IVPB (3,000 mg IntraVENous New Bag 2/8/23 2004)   0.9 % sodium chloride infusion (has no administration in time range)   sodium chloride flush 0.9 % injection 5-40 mL (has no administration in time range)   sodium chloride flush 0.9 % injection 5-40 mL (has no administration in time range)   0.9 % sodium chloride infusion (has no administration in time range)   fentaNYL 10 mcg/ml in 0.9%  ml infusion (75 mcg/hr IntraVENous Rate/Dose Change 2/8/23 2029)   0.9 % sodium chloride bolus (0 mLs IntraVENous Stopped 2/8/23 2127)   LORazepam (ATIVAN) injection 2 mg (2 mg IntraVENous Given 2/8/23 1504)   haloperidol lactate (HALDOL) injection 5 mg (5 mg IntraMUSCular Given 2/8/23 1543)   ketamine (KETALAR) injection 86.2 mg (86.2 mg IntraVENous Given by Other 2/8/23 1648)   etomidate (AMIDATE) injection 40 mg (40 mg IntraVENous Given 2/8/23 1719)   rocuronium (ZEMURON) injection 150 mg (150 mg IntraVENous Given 2/8/23 1720)   0.9 % sodium chloride bolus (1,000 mLs IntraVENous New Bag 2/8/23 1924)   acyclovir (ZOVIRAX) 750 mg in sodium chloride 0.9 % 250 mL IVPB (0 mg IntraVENous Stopped 2/8/23 2030)   meropenem (MERREM) 2,000 mg in sodium chloride 0.9 % 100 mL IVPB (0 mg IntraVENous Stopped 2/8/23 2004) dexamethasone (DECADRON) injection 10 mg (10 mg IntraVENous Given 2/8/23 1914)   ipratropium-albuterol (DUONEB) nebulizer solution 3 ampule (3 ampules Inhalation Given 2/8/23 1809)   fentaNYL (SUBLIMAZE) injection 100 mcg (100 mcg IntraVENous Given 2/8/23 1942)   iopamidol (ISOVUE-370) 76 % injection 75 mL (75 mLs IntraVENous Given 2/8/23 2115)   fentaNYL (SUBLIMAZE) injection 100 mcg (100 mcg IntraVENous Given 2/8/23 2032)          Medical Decision Making  Differential included but not limited to sepsis vs acs vs meningitis vs acute respiratory failure with hypercapnia vs drug intoxication vs hypothyroidism    Amount and/or Complexity of Data Reviewed  Independent Historian:      Details: Family provided additional history. Labs: ordered. Decision-making details documented in ED Course. Radiology: ordered and independent interpretation performed. Decision-making details documented in ED Course. ECG/medicine tests: ordered and independent interpretation performed. Decision-making details documented in ED Course. Discussion of management or test interpretation with external provider(s): Discussed with [facility] who will accept patient. Also spoke with Evelin Naranjo of ICU and Dr. Mable Winchester of medicine who will admit patient pending transfer. Risk  Prescription drug management. Decision regarding hospitalization. Critical Care  Total time providing critical care: (60 minutes)      ED Course as of 02/08/23 2137 Wed Feb 08, 2023   1400 ATTENDING PROVIDER ATTESTATION:     I have personally performed and/or participated in the history, exam, medical decision making, and procedures and agree with all pertinent clinical information unless otherwise noted. I have also reviewed and agree with the past medical, family and social history unless otherwise noted. I have discussed this patient in detail with the resident, and provided the instruction and education regarding . Patient here, his son is not present, the patient lives with other family members has a history of MS, drug and alcohol abuse as well as COPD although has been clean from drugs and alcohol for several years per the son. Apparently was confused today, could not get himself up off of the toilet and has some irregular nonspecific muscle movements. The patient is offering no information himself. My findings/plan: Patient is a morbidly obese male in the bed in no distress varying myoclonic movements of his extremities, none no seizure-like activity. No sign of acute head or face injury. No jaundice or icterus. Abdomen soft and nontender with no guarding or rebound tenderness. Patient answers his name correctly but gives no other correct answers for me. Intermittently is following commands but not consistently. Has no jaundice or icterus. Lungs are diminished but equal bilaterally anteriorly. Heart rate regular. Arms and legs are neurovascular tact and no sign of acute bone or joint injury no unilateral leg swelling or calf pain. [NC]   3338 Called to CT for patient continued to be agitated. The decision was made to intubate the patient for patient and staff safety as patient is flailing around and lab work and imaging is unable to be obtained. Family was present in room is agreeable with plan. [BB]   1709 Patient becoming increasingly restless and agitated throughout his stay less cooperative and confused. Requiring several attempts with sedation to get his CT head, laboratory results have been monitored and are fairly unremarkable. Patient at this time is back from CT and rolling around on the bed and cooperative, agitated and a threat to his own safety.  [NC]   1901 Attempts by the resident as well as myself performing the lumbar puncture under general sterile techniques were unsuccessful, we attempted use ultrasound and were unsuccessful, I have gone over the radiology department, IR has been gone for the day and nobody is here for IR. [NC]   1902 Please note I was personally in [NC]   1902 Please note I was directly involved with and or supervised the intubation and central venous catheter placement of this patient as well as the postprocedure evaluations and examinations. [NC]   1934 CRITICAL CARE:   61 MINUTES. Please note that the withdrawal or failure to initiate urgent interventions for this patient would likely result in a life threatening deterioration or permanent disability. Accordingly this patient received the above mentioned time, excluding separately billable procedures. [NC]   2101 Accepted by [facility]. Spoke with Dr. Alexandria Miller. They do not have beds at this time. Will admit to ICU in the mean time. [BB]   2120 EKG normal sinus rhythm with a rate of 67 with no acute ischemic ST-T wave changes. Normal axis, normal conduction otherwise. Otherwise agree with resident. [NC]   2121 EKG shows normal sinus rhythm with a ventricular rate of 67 bpm.  Normal axis. Does not meet STEMI criteria. Comparable to previous EKG on 12/16/2018. As interpreted by myself the ED physician [BB]   2131 Patient presents with altered mental status. He is disoriented on my exam.  Work-up was ordered. During patient's stay began to have agitation. Patient did have a history of alcohol abuse. Ativan was given without improvement. Haldol was then given and patient continued to be agitated flailing around. Ketamine was given and patient continued to have agitation and worsening mentation. Decision was made to intubate the patient to proceed with work-up for both patient's safety and staff. Patient was sent to CT and CT of the head did not show any brain bleed. CBC showed a mild leukocytosis of 14.2 and no anemia. Glucose was within normal limits. CMP was unremarkable without any electrolyte abnormalities or elevations in his transaminases. Ammonia within normal limits. COVID and influenza were negative.   TSH was within normal limits. Low concern for hypothyroidism. Cultures were drawn and are pending. Troponins with nonsignificant delta. EKG did not meet STEMI criteria. Low concern for ACS. Urine drug screen was negative outside of a positive result for opiates. Patient is prescribed home opiates. Urinalysis was unconvincing of UTI. ABG was obtained which did not show any acidosis and a mild increase in his CO2 at 45.1. Likely secondary to his obesity hypoventilation. Repeat ABG was obtained which did show worsening hypercapnia. Adjustments were made to the vents to increase his respirations. Repeat ABG is pending. Concern for encephalitis. Jessenia Milch, and acyclovir were started. Patient was also given Decadron for his history of MS. Lumbar puncture was attempted and was unsuccessful. Per patient's family's request, patient will be transferred to [facility]. They do not currently have any beds. Patient mated to the ICU pending transfer. [BB]      ED Course User Index  [BB] Reg Blair DO  [NC] Obed Barrientos DO        Re-Evaluations:  Patient was reevaluted and had worsening mentation. This patient's ED course included: a personal history and physicial examination, multiple bedside re-evaluations, IV medications, cardiac monitoring, continuous pulse oximetry, and complex medical decision making and emergency management    This patient has remained hemodynamically stable during their ED course. Consultations:  ICU    Counseling: The emergency provider has spoken with the family member son and daughter and discussed todays results, in addition to providing specific details for the plan of care and counseling regarding the diagnosis and prognosis. Questions are answered at this time and they are agreeable with the plan.       --------------------------------- IMPRESSION AND DISPOSITION ---------------------------------    IMPRESSION  1.  Encephalitis        DISPOSITION  Disposition: Admit to CCU/ICU pending transfer to [facility]  Patient condition is critical    Patient was seen and evaluated by both myself and Aung Keys DO. NOTE: This report was transcribed using voice recognition software.  Every effort was made to ensure accuracy; however, inadvertent computerized transcription errors may be present           Shani Hwang DO  Resident  02/08/23 1268

## 2023-02-08 NOTE — ED NOTES
8.0 ET tube placed, 22 cm at gum line. Xray called for verification.       Avelina Cade RN  02/08/23 [id number]

## 2023-02-08 NOTE — ED NOTES
Pt being placed on monitor continues to thrash and yell in the bed.       Flora Champagne RN  02/08/23 7318

## 2023-02-09 ENCOUNTER — APPOINTMENT (OUTPATIENT)
Dept: GENERAL RADIOLOGY | Age: 58
DRG: 208 | End: 2023-02-09
Payer: MEDICARE

## 2023-02-09 ENCOUNTER — APPOINTMENT (OUTPATIENT)
Dept: INTERVENTIONAL RADIOLOGY/VASCULAR | Age: 58
DRG: 208 | End: 2023-02-09
Payer: MEDICARE

## 2023-02-09 LAB
AADO2: 286.5 MMHG
AADO2: 291.5 MMHG
ADENOVIRUS BY PCR: NOT DETECTED
ANION GAP SERPL CALCULATED.3IONS-SCNC: 8 MMOL/L (ref 7–16)
B.E.: -1.3 MMOL/L (ref -3–3)
B.E.: 0.8 MMOL/L (ref -3–3)
BASOPHILS ABSOLUTE: 0.02 E9/L (ref 0–0.2)
BASOPHILS RELATIVE PERCENT: 0.1 % (ref 0–2)
BORDETELLA PARAPERTUSSIS BY PCR: NOT DETECTED
BORDETELLA PERTUSSIS BY PCR: NOT DETECTED
BUN BLDV-MCNC: 16 MG/DL (ref 6–20)
CALCIUM SERPL-MCNC: 8.1 MG/DL (ref 8.6–10.2)
CHLAMYDOPHILIA PNEUMONIAE BY PCR: NOT DETECTED
CHLORIDE BLD-SCNC: 106 MMOL/L (ref 98–107)
CO2: 28 MMOL/L (ref 22–29)
COHB: 0.3 % (ref 0–1.5)
COHB: 0.6 % (ref 0–1.5)
CORONAVIRUS 229E BY PCR: NOT DETECTED
CORONAVIRUS HKU1 BY PCR: NOT DETECTED
CORONAVIRUS NL63 BY PCR: NOT DETECTED
CORONAVIRUS OC43 BY PCR: NOT DETECTED
CREAT SERPL-MCNC: 1 MG/DL (ref 0.7–1.2)
CRITICAL: ABNORMAL
CRITICAL: ABNORMAL
DATE ANALYZED: ABNORMAL
DATE ANALYZED: ABNORMAL
DATE OF COLLECTION: ABNORMAL
DATE OF COLLECTION: ABNORMAL
EOSINOPHILS ABSOLUTE: 0 E9/L (ref 0.05–0.5)
EOSINOPHILS RELATIVE PERCENT: 0 % (ref 0–6)
FIO2: 65 %
FIO2: 65 %
GFR SERPL CREATININE-BSD FRML MDRD: >60 ML/MIN/1.73
GLUCOSE BLD-MCNC: 195 MG/DL (ref 74–99)
HCO3: 25.7 MMOL/L (ref 22–26)
HCO3: 27 MMOL/L (ref 22–26)
HCT VFR BLD CALC: 43.6 % (ref 37–54)
HEMOGLOBIN: 13.6 G/DL (ref 12.5–16.5)
HHB: 2.4 % (ref 0–5)
HHB: 2.5 % (ref 0–5)
HUMAN METAPNEUMOVIRUS BY PCR: NOT DETECTED
HUMAN RHINOVIRUS/ENTEROVIRUS BY PCR: NOT DETECTED
IMMATURE GRANULOCYTES #: 0.1 E9/L
IMMATURE GRANULOCYTES %: 0.6 % (ref 0–5)
INFLUENZA A BY PCR: NOT DETECTED
INFLUENZA B BY PCR: NOT DETECTED
L. PNEUMOPHILA SEROGP 1 UR AG: NORMAL
LAB: ABNORMAL
LAB: ABNORMAL
LYMPHOCYTES ABSOLUTE: 0.72 E9/L (ref 1.5–4)
LYMPHOCYTES RELATIVE PERCENT: 4.3 % (ref 20–42)
Lab: ABNORMAL
Lab: ABNORMAL
MAGNESIUM: 1.9 MG/DL (ref 1.6–2.6)
MCH RBC QN AUTO: 30.6 PG (ref 26–35)
MCHC RBC AUTO-ENTMCNC: 31.2 % (ref 32–34.5)
MCV RBC AUTO: 98.2 FL (ref 80–99.9)
METHB: 0.5 % (ref 0–1.5)
METHB: 0.6 % (ref 0–1.5)
MODE: AC
MODE: AC
MONOCYTES ABSOLUTE: 0.44 E9/L (ref 0.1–0.95)
MONOCYTES RELATIVE PERCENT: 2.6 % (ref 2–12)
MYCOPLASMA PNEUMONIAE BY PCR: NOT DETECTED
NEUTROPHILS ABSOLUTE: 15.45 E9/L (ref 1.8–7.3)
NEUTROPHILS RELATIVE PERCENT: 92.4 % (ref 43–80)
O2 CONTENT: 19.9 ML/DL
O2 CONTENT: 20.4 ML/DL
O2 SATURATION: 97.5 % (ref 92–98.5)
O2 SATURATION: 97.6 % (ref 92–98.5)
O2HB: 96.4 % (ref 94–97)
O2HB: 96.7 % (ref 94–97)
OPERATOR ID: 2323
OPERATOR ID: 2323
PARAINFLUENZA VIRUS 1 BY PCR: NOT DETECTED
PARAINFLUENZA VIRUS 2 BY PCR: NOT DETECTED
PARAINFLUENZA VIRUS 3 BY PCR: NOT DETECTED
PARAINFLUENZA VIRUS 4 BY PCR: NOT DETECTED
PATIENT TEMP: 37 C
PATIENT TEMP: 37 C
PCO2: 49.4 MMHG (ref 35–45)
PCO2: 51.8 MMHG (ref 35–45)
PDW BLD-RTO: 13.2 FL (ref 11.5–15)
PEEP/CPAP: 5 CMH2O
PEEP/CPAP: 5 CMH2O
PFO2: 1.57 MMHG/%
PFO2: 1.61 MMHG/%
PH BLOOD GAS: 7.31 (ref 7.35–7.45)
PH BLOOD GAS: 7.36 (ref 7.35–7.45)
PHOSPHORUS: 3.8 MG/DL (ref 2.5–4.5)
PLATELET # BLD: 343 E9/L (ref 130–450)
PMV BLD AUTO: 10 FL (ref 7–12)
PO2: 102 MMHG (ref 75–100)
PO2: 104.4 MMHG (ref 75–100)
POTASSIUM SERPL-SCNC: 4.2 MMOL/L (ref 3.5–5)
PROCALCITONIN: 0.04 NG/ML (ref 0–0.08)
RBC # BLD: 4.44 E12/L (ref 3.8–5.8)
RESPIRATORY SYNCYTIAL VIRUS BY PCR: NOT DETECTED
RI(T): 2.74
RI(T): 2.86
RR MECHANICAL: 20 B/MIN
RR MECHANICAL: 20 B/MIN
SARS-COV-2, PCR: NOT DETECTED
SODIUM BLD-SCNC: 142 MMOL/L (ref 132–146)
SOURCE, BLOOD GAS: ABNORMAL
SOURCE, BLOOD GAS: ABNORMAL
STREP PNEUMONIAE ANTIGEN, URINE: NORMAL
THB: 14.6 G/DL (ref 11.5–16.5)
THB: 15 G/DL (ref 11.5–16.5)
TIME ANALYZED: 454
TIME ANALYZED: 9
VT MECHANICAL: 400 ML
VT MECHANICAL: 400 ML
WBC # BLD: 16.7 E9/L (ref 4.5–11.5)

## 2023-02-09 PROCEDURE — 71045 X-RAY EXAM CHEST 1 VIEW: CPT

## 2023-02-09 PROCEDURE — 83735 ASSAY OF MAGNESIUM: CPT

## 2023-02-09 PROCEDURE — 85025 COMPLETE CBC W/AUTO DIFF WBC: CPT

## 2023-02-09 PROCEDURE — 6360000002 HC RX W HCPCS: Performed by: INTERNAL MEDICINE

## 2023-02-09 PROCEDURE — 36600 WITHDRAWAL OF ARTERIAL BLOOD: CPT

## 2023-02-09 PROCEDURE — C9113 INJ PANTOPRAZOLE SODIUM, VIA: HCPCS

## 2023-02-09 PROCEDURE — 87449 NOS EACH ORGANISM AG IA: CPT

## 2023-02-09 PROCEDURE — 0202U NFCT DS 22 TRGT SARS-COV-2: CPT

## 2023-02-09 PROCEDURE — 6370000000 HC RX 637 (ALT 250 FOR IP): Performed by: INTERNAL MEDICINE

## 2023-02-09 PROCEDURE — 2580000003 HC RX 258: Performed by: INTERNAL MEDICINE

## 2023-02-09 PROCEDURE — 6360000002 HC RX W HCPCS

## 2023-02-09 PROCEDURE — A4216 STERILE WATER/SALINE, 10 ML: HCPCS

## 2023-02-09 PROCEDURE — 87206 SMEAR FLUORESCENT/ACID STAI: CPT

## 2023-02-09 PROCEDURE — 84100 ASSAY OF PHOSPHORUS: CPT

## 2023-02-09 PROCEDURE — 2580000003 HC RX 258

## 2023-02-09 PROCEDURE — 6360000002 HC RX W HCPCS: Performed by: STUDENT IN AN ORGANIZED HEALTH CARE EDUCATION/TRAINING PROGRAM

## 2023-02-09 PROCEDURE — 2000000000 HC ICU R&B

## 2023-02-09 PROCEDURE — 93970 EXTREMITY STUDY: CPT

## 2023-02-09 PROCEDURE — 94003 VENT MGMT INPAT SUBQ DAY: CPT

## 2023-02-09 PROCEDURE — 2500000003 HC RX 250 WO HCPCS: Performed by: INTERNAL MEDICINE

## 2023-02-09 PROCEDURE — 84145 PROCALCITONIN (PCT): CPT

## 2023-02-09 PROCEDURE — 94640 AIRWAY INHALATION TREATMENT: CPT

## 2023-02-09 PROCEDURE — 82805 BLOOD GASES W/O2 SATURATION: CPT

## 2023-02-09 PROCEDURE — 6370000000 HC RX 637 (ALT 250 FOR IP)

## 2023-02-09 PROCEDURE — 2580000003 HC RX 258: Performed by: STUDENT IN AN ORGANIZED HEALTH CARE EDUCATION/TRAINING PROGRAM

## 2023-02-09 PROCEDURE — 80048 BASIC METABOLIC PNL TOTAL CA: CPT

## 2023-02-09 PROCEDURE — 87088 URINE BACTERIA CULTURE: CPT

## 2023-02-09 PROCEDURE — 87070 CULTURE OTHR SPECIMN AEROBIC: CPT

## 2023-02-09 RX ORDER — MIDAZOLAM HYDROCHLORIDE 5 MG/ML
INJECTION INTRAMUSCULAR; INTRAVENOUS
Status: COMPLETED
Start: 2023-02-09 | End: 2023-02-09

## 2023-02-09 RX ORDER — BUMETANIDE 1 MG/1
1 TABLET ORAL DAILY
Status: DISCONTINUED | OUTPATIENT
Start: 2023-02-09 | End: 2023-02-09

## 2023-02-09 RX ORDER — MIDAZOLAM HYDROCHLORIDE 1 MG/ML
5 INJECTION INTRAMUSCULAR; INTRAVENOUS ONCE
Status: DISCONTINUED | OUTPATIENT
Start: 2023-02-09 | End: 2023-02-09 | Stop reason: CLARIF

## 2023-02-09 RX ORDER — SPIRONOLACTONE 25 MG/1
25 TABLET ORAL DAILY
Status: DISCONTINUED | OUTPATIENT
Start: 2023-02-09 | End: 2023-02-09

## 2023-02-09 RX ORDER — PRAMIPEXOLE DIHYDROCHLORIDE 0.12 MG/1
0.12 TABLET ORAL 3 TIMES DAILY
Status: DISCONTINUED | OUTPATIENT
Start: 2023-02-09 | End: 2023-02-21 | Stop reason: HOSPADM

## 2023-02-09 RX ORDER — SODIUM CHLORIDE 0.9 % (FLUSH) 0.9 %
5-40 SYRINGE (ML) INJECTION PRN
Status: DISCONTINUED | OUTPATIENT
Start: 2023-02-09 | End: 2023-02-21 | Stop reason: HOSPADM

## 2023-02-09 RX ORDER — PROPOFOL 10 MG/ML
5-50 INJECTION, EMULSION INTRAVENOUS CONTINUOUS
Status: DISCONTINUED | OUTPATIENT
Start: 2023-02-09 | End: 2023-02-11

## 2023-02-09 RX ORDER — MIDAZOLAM HYDROCHLORIDE 5 MG/ML
5 INJECTION INTRAMUSCULAR; INTRAVENOUS ONCE
Status: COMPLETED | OUTPATIENT
Start: 2023-02-09 | End: 2023-02-09

## 2023-02-09 RX ORDER — BACLOFEN 10 MG/1
20 TABLET ORAL 3 TIMES DAILY
Status: DISCONTINUED | OUTPATIENT
Start: 2023-02-09 | End: 2023-02-10

## 2023-02-09 RX ORDER — IPRATROPIUM BROMIDE AND ALBUTEROL SULFATE 2.5; .5 MG/3ML; MG/3ML
1 SOLUTION RESPIRATORY (INHALATION) EVERY 4 HOURS
Status: DISCONTINUED | OUTPATIENT
Start: 2023-02-09 | End: 2023-02-17 | Stop reason: CLARIF

## 2023-02-09 RX ORDER — LEVOTHYROXINE SODIUM 175 UG/1
175 TABLET ORAL DAILY
Status: DISCONTINUED | OUTPATIENT
Start: 2023-02-09 | End: 2023-02-21 | Stop reason: HOSPADM

## 2023-02-09 RX ORDER — SODIUM CHLORIDE 0.9 % (FLUSH) 0.9 %
5-40 SYRINGE (ML) INJECTION EVERY 12 HOURS SCHEDULED
Status: DISCONTINUED | OUTPATIENT
Start: 2023-02-09 | End: 2023-02-21 | Stop reason: HOSPADM

## 2023-02-09 RX ORDER — GABAPENTIN 300 MG/1
600 CAPSULE ORAL 2 TIMES DAILY
Status: DISCONTINUED | OUTPATIENT
Start: 2023-02-09 | End: 2023-02-09

## 2023-02-09 RX ORDER — ACETAMINOPHEN 325 MG/1
650 TABLET ORAL EVERY 6 HOURS PRN
Status: DISCONTINUED | OUTPATIENT
Start: 2023-02-09 | End: 2023-02-21 | Stop reason: HOSPADM

## 2023-02-09 RX ORDER — ENOXAPARIN SODIUM 100 MG/ML
60 INJECTION SUBCUTANEOUS 2 TIMES DAILY
Status: DISCONTINUED | OUTPATIENT
Start: 2023-02-09 | End: 2023-02-21 | Stop reason: HOSPADM

## 2023-02-09 RX ORDER — ENOXAPARIN SODIUM 100 MG/ML
40 INJECTION SUBCUTANEOUS 2 TIMES DAILY
Status: DISCONTINUED | OUTPATIENT
Start: 2023-02-09 | End: 2023-02-09

## 2023-02-09 RX ORDER — GABAPENTIN 300 MG/1
600 CAPSULE ORAL 2 TIMES DAILY
Status: DISCONTINUED | OUTPATIENT
Start: 2023-02-09 | End: 2023-02-10

## 2023-02-09 RX ORDER — ACETAMINOPHEN 650 MG/1
650 SUPPOSITORY RECTAL EVERY 6 HOURS PRN
Status: DISCONTINUED | OUTPATIENT
Start: 2023-02-09 | End: 2023-02-21 | Stop reason: HOSPADM

## 2023-02-09 RX ORDER — SODIUM CHLORIDE 9 MG/ML
INJECTION, SOLUTION INTRAVENOUS PRN
Status: DISCONTINUED | OUTPATIENT
Start: 2023-02-09 | End: 2023-02-10

## 2023-02-09 RX ORDER — POLYETHYLENE GLYCOL 3350 17 G/17G
17 POWDER, FOR SOLUTION ORAL DAILY PRN
Status: DISCONTINUED | OUTPATIENT
Start: 2023-02-09 | End: 2023-02-21 | Stop reason: HOSPADM

## 2023-02-09 RX ORDER — DEXTROSE MONOHYDRATE 100 MG/ML
INJECTION, SOLUTION INTRAVENOUS CONTINUOUS PRN
Status: DISCONTINUED | OUTPATIENT
Start: 2023-02-09 | End: 2023-02-21 | Stop reason: HOSPADM

## 2023-02-09 RX ADMIN — IPRATROPIUM BROMIDE AND ALBUTEROL SULFATE 1 AMPULE: .5; 2.5 SOLUTION RESPIRATORY (INHALATION) at 04:43

## 2023-02-09 RX ADMIN — PROPOFOL 50 MCG/KG/MIN: 10 INJECTION, EMULSION INTRAVENOUS at 13:18

## 2023-02-09 RX ADMIN — HYDROCORTISONE SODIUM SUCCINATE 50 MG: 100 INJECTION, POWDER, FOR SOLUTION INTRAMUSCULAR; INTRAVENOUS at 00:16

## 2023-02-09 RX ADMIN — VANCOMYCIN HYDROCHLORIDE 1000 MG: 1 INJECTION, POWDER, LYOPHILIZED, FOR SOLUTION INTRAVENOUS at 20:59

## 2023-02-09 RX ADMIN — METOPROLOL TARTRATE 25 MG: 25 TABLET, FILM COATED ORAL at 09:04

## 2023-02-09 RX ADMIN — GABAPENTIN 600 MG: 300 CAPSULE ORAL at 21:06

## 2023-02-09 RX ADMIN — MEROPENEM 2000 MG: 1 INJECTION, POWDER, FOR SOLUTION INTRAVENOUS at 20:57

## 2023-02-09 RX ADMIN — FENTANYL CITRATE 100 MCG/HR: 50 INJECTION, SOLUTION INTRAMUSCULAR; INTRAVENOUS at 08:16

## 2023-02-09 RX ADMIN — SODIUM CHLORIDE, PRESERVATIVE FREE 40 MG: 5 INJECTION INTRAVENOUS at 09:05

## 2023-02-09 RX ADMIN — PRAMIPEXOLE DIHYDROCHLORIDE 0.12 MG: 0.12 TABLET ORAL at 09:05

## 2023-02-09 RX ADMIN — IPRATROPIUM BROMIDE AND ALBUTEROL SULFATE 1 AMPULE: .5; 2.5 SOLUTION RESPIRATORY (INHALATION) at 17:30

## 2023-02-09 RX ADMIN — MEROPENEM 2000 MG: 1 INJECTION, POWDER, FOR SOLUTION INTRAVENOUS at 03:00

## 2023-02-09 RX ADMIN — PROPOFOL 50 MCG/KG/MIN: 10 INJECTION, EMULSION INTRAVENOUS at 07:52

## 2023-02-09 RX ADMIN — BACLOFEN 20 MG: 10 TABLET ORAL at 09:04

## 2023-02-09 RX ADMIN — ENOXAPARIN SODIUM 40 MG: 100 INJECTION SUBCUTANEOUS at 11:58

## 2023-02-09 RX ADMIN — METOPROLOL TARTRATE 25 MG: 25 TABLET, FILM COATED ORAL at 21:07

## 2023-02-09 RX ADMIN — Medication 10 ML: at 08:34

## 2023-02-09 RX ADMIN — Medication 10 ML: at 21:07

## 2023-02-09 RX ADMIN — AZITHROMYCIN MONOHYDRATE 500 MG: 500 INJECTION, POWDER, LYOPHILIZED, FOR SOLUTION INTRAVENOUS at 07:56

## 2023-02-09 RX ADMIN — ACYCLOVIR SODIUM 750 MG: 50 INJECTION, SOLUTION INTRAVENOUS at 19:14

## 2023-02-09 RX ADMIN — BUDESONIDE 500 MCG: 0.5 SUSPENSION RESPIRATORY (INHALATION) at 04:42

## 2023-02-09 RX ADMIN — LEVOTHYROXINE SODIUM 175 MCG: 0.17 TABLET ORAL at 09:05

## 2023-02-09 RX ADMIN — PRAMIPEXOLE DIHYDROCHLORIDE 0.12 MG: 0.12 TABLET ORAL at 13:25

## 2023-02-09 RX ADMIN — ACYCLOVIR SODIUM 750 MG: 50 INJECTION, SOLUTION INTRAVENOUS at 03:00

## 2023-02-09 RX ADMIN — GABAPENTIN 600 MG: 300 CAPSULE ORAL at 13:25

## 2023-02-09 RX ADMIN — FENTANYL CITRATE 150 MCG/HR: 50 INJECTION, SOLUTION INTRAMUSCULAR; INTRAVENOUS at 20:55

## 2023-02-09 RX ADMIN — PROPOFOL 50 MCG/KG/MIN: 10 INJECTION, EMULSION INTRAVENOUS at 01:33

## 2023-02-09 RX ADMIN — SODIUM CHLORIDE, POTASSIUM CHLORIDE, SODIUM LACTATE AND CALCIUM CHLORIDE: 600; 310; 30; 20 INJECTION, SOLUTION INTRAVENOUS at 11:42

## 2023-02-09 RX ADMIN — MEROPENEM 2000 MG: 1 INJECTION, POWDER, FOR SOLUTION INTRAVENOUS at 11:51

## 2023-02-09 RX ADMIN — IPRATROPIUM BROMIDE AND ALBUTEROL SULFATE 1 AMPULE: .5; 2.5 SOLUTION RESPIRATORY (INHALATION) at 09:39

## 2023-02-09 RX ADMIN — IPRATROPIUM BROMIDE AND ALBUTEROL SULFATE 1 AMPULE: .5; 2.5 SOLUTION RESPIRATORY (INHALATION) at 12:55

## 2023-02-09 RX ADMIN — SODIUM CHLORIDE 0.05 MG/KG/HR: 9 INJECTION, SOLUTION INTRAVENOUS at 18:38

## 2023-02-09 RX ADMIN — VANCOMYCIN HYDROCHLORIDE 1000 MG: 1 INJECTION, POWDER, LYOPHILIZED, FOR SOLUTION INTRAVENOUS at 08:19

## 2023-02-09 RX ADMIN — BACLOFEN 20 MG: 10 TABLET ORAL at 13:25

## 2023-02-09 RX ADMIN — BUDESONIDE 500 MCG: 0.5 SUSPENSION RESPIRATORY (INHALATION) at 17:31

## 2023-02-09 RX ADMIN — IPRATROPIUM BROMIDE AND ALBUTEROL SULFATE 1 AMPULE: .5; 2.5 SOLUTION RESPIRATORY (INHALATION) at 22:32

## 2023-02-09 RX ADMIN — BACLOFEN 20 MG: 10 TABLET ORAL at 21:06

## 2023-02-09 RX ADMIN — PROPOFOL 50 MCG/KG/MIN: 10 INJECTION, EMULSION INTRAVENOUS at 19:21

## 2023-02-09 RX ADMIN — MIDAZOLAM HYDROCHLORIDE 5 MG: 5 INJECTION INTRAMUSCULAR; INTRAVENOUS at 18:35

## 2023-02-09 RX ADMIN — ACYCLOVIR SODIUM 750 MG: 50 INJECTION, SOLUTION INTRAVENOUS at 11:52

## 2023-02-09 RX ADMIN — Medication 10 ML: at 21:13

## 2023-02-09 RX ADMIN — PRAMIPEXOLE DIHYDROCHLORIDE 0.12 MG: 0.12 TABLET ORAL at 21:14

## 2023-02-09 RX ADMIN — MIDAZOLAM 5 MG: 5 INJECTION INTRAMUSCULAR; INTRAVENOUS at 18:35

## 2023-02-09 RX ADMIN — HYDROCORTISONE SODIUM SUCCINATE 50 MG: 100 INJECTION, POWDER, FOR SOLUTION INTRAMUSCULAR; INTRAVENOUS at 08:19

## 2023-02-09 RX ADMIN — ENOXAPARIN SODIUM 60 MG: 100 INJECTION SUBCUTANEOUS at 21:06

## 2023-02-09 RX ADMIN — Medication 10 ML: at 00:16

## 2023-02-09 ASSESSMENT — PULMONARY FUNCTION TESTS
PIF_VALUE: 21
PIF_VALUE: 23
PIF_VALUE: 21
PIF_VALUE: 23
PIF_VALUE: 25
PIF_VALUE: 21
PIF_VALUE: 24
PIF_VALUE: 21
PIF_VALUE: 25
PIF_VALUE: 24
PIF_VALUE: 25
PIF_VALUE: 24
PIF_VALUE: 24
PIF_VALUE: 23
PIF_VALUE: 27
PIF_VALUE: 22
PIF_VALUE: 21
PIF_VALUE: 25
PIF_VALUE: 22
PIF_VALUE: 21
PIF_VALUE: 27
PIF_VALUE: 25
PIF_VALUE: 22
PIF_VALUE: 24
PIF_VALUE: 27
PIF_VALUE: 24
PIF_VALUE: 24
PIF_VALUE: 21

## 2023-02-09 NOTE — PROGRESS NOTES
Department of Internal Medicine  PN    PCP: Celestine Smart DO  Admitting Physician: Dr. Anner Baumgarten  Consultants:   Date of Service: 2/8/2023    CHIEF COMPLAINT: Altered mental status    HISTORY OF PRESENT ILLNESS:    Patient is 60-year-old male who presented to the ED with altered mental status. HPI obtained from staff and chart review. Patient lives in the basement apparently. Mother stated that she has a room overhead where her son stays. States that she heard her son talking nonsensically and repeating phrases. She confronted him and tried to assist him. Later in the day patient went to the bathroom however did not come out and mother became concerned and called EMS. .  On presentation patient was able to answer questions although confused. patient was intubated due to protection of airway and need for further evaluation in the setting of agitation. 2/9/2023  Patient seen examined on ICU. Patient's family members at the bedside and case discussed. Case discussed with patient's nurse at the bedside. BUN/creatinine 16/1.0 with fasting blood sugar 195. Procalcitonin was 0.04. Drug screen positive for opiates with the patient on Percocet at home. WBC 16.7 hemoglobin 13.6. Urinalysis is unremarkable. Temperature 99 with heart rate 71 blood pressure 132/87. O2 sat 97% with the patient on assist control ventilator with a rate of 20 and 8 of PEEP and FiO2 55%. Urinary output is adequate.     PAST MEDICAL Hx:  Past Medical History:   Diagnosis Date    Dermatophytosis 1/12/2023    Hyperlipidemia     Hypertension     Leg pain     Leg swelling 1/12/2023    Lymphedema     Lymphedema of both lower extremities 1/12/2023    MS (multiple sclerosis) ([address]     Multiple sclerosis ([address]     Multiple sclerosis ([address] 1/12/2023    With significant weakness of both legs follows up with [facility]    Spinal stenosis, lumbar     Thyroid disease     Tinea pedis of both feet 1/12/2023       PAST SURGICAL Hx:   Past Surgical History:   Procedure Laterality Date    HERNIA REPAIR      TONSILLECTOMY         FAMILY Hx:  No family history on file. HOME MEDICATIONS:  Prior to Admission medications    Medication Sig Start Date End Date Taking? Authorizing Provider   miconazole nitrate 2 % OINT Apply topically 2 times daily Apply to the toes in between the toes both feet and both calfs up to the knee twice a day for 1 month 1/19/23   Ap Denton MD   bumetanide (BUMEX) 1 MG tablet Take 1 mg by mouth daily    Historical Provider, MD   levothyroxine (SYNTHROID) 175 MCG tablet Take 175 mcg by mouth Daily    Historical Provider, MD   spironolactone (ALDACTONE) 25 MG tablet Take 25 mg by mouth daily    Historical Provider, MD   pramipexole (MIRAPEX) 0.125 MG tablet Take 0.125 mg by mouth 3 times daily    Historical Provider, MD   baclofen (LIORESAL) 20 MG tablet Take 20 mg by mouth 3 times daily    Historical Provider, MD   metFORMIN (GLUCOPHAGE) 500 MG tablet Take 500 mg by mouth 2 times daily (with meals)    Historical Provider, MD   metoprolol tartrate (LOPRESSOR) 25 MG tablet Take 25 mg by mouth 2 times daily    Historical Provider, MD   terbinafine (LAMISIL) 250 MG tablet Take 1 tablet by mouth daily 1/12/23 2/11/23  Ap Denton MD   oxyCODONE-acetaminophen (PERCOCET)  MG per tablet Take 1 tablet by mouth every 4 hours as needed for Pain. Historical Provider, MD   ipratropium (ATROVENT) 0.02 % nebulizer solution Take 2.5 mLs by nebulization 4 times daily 12/16/18   Elizabeth Chavez MD   Dimethyl Fumarate (TECFIDERA PO) Take  by mouth 2 times daily. Historical Provider, MD   gabapentin (NEURONTIN) 300 MG capsule Take 600 mg by mouth 2 times daily. Historical Provider, MD   albuterol (PROVENTIL HFA) 108 (90 BASE) MCG/ACT inhaler Inhale 2 puffs into the lungs every 6 hours as needed for Wheezing for 7 days. 1/1/14 1/8/14  Elizabeth Chavez MD   vitamin E 1000 UNITS capsule Take 1,000 Units by mouth daily. Historical Provider, MD   potassium chloride (KLOR-CON) 10 MEQ CR tablet Take 10 mEq by mouth daily. Historical Provider, MD   furosemide (LASIX) 20 MG tablet Take 20 mg by mouth daily. Historical Provider, MD   Cholecalciferol (VITAMIN D3) 5000 UNITS CAPS Take  by mouth daily. Historical Provider, MD   loratadine (CLARITIN) 10 MG tablet Take 10 mg by mouth daily. Historical Provider, MD       ALLERGIES:  Bactrim [sulfamethoxazole-trimethoprim], Penicillins, and Sulfa antibiotics    SOCIAL Hx:  Social History     Socioeconomic History    Marital status:      Spouse name: Not on file    Number of children: Not on file    Years of education: Not on file    Highest education level: Not on file   Occupational History    Not on file   Tobacco Use    Smoking status: Every Day     Packs/day: 1.00     Types: Cigarettes    Smokeless tobacco: Never   Substance and Sexual Activity    Alcohol use: Yes     Comment: rarely    Drug use: Not Currently     Types: Marijuana Connor Shoemaker)     Comment: edible    Sexual activity: Not on file   Other Topics Concern    Not on file   Social History Narrative    ** Merged History Encounter **          Social Determinants of Health     Financial Resource Strain: Not on file   Food Insecurity: Not on file   Transportation Needs: Not on file   Physical Activity: Not on file   Stress: Not on file   Social Connections: Not on file   Intimate Partner Violence: Not on file   Housing Stability: Not on file       ROS: Positive in bold  General:   Denies chills, fatigue, fever, malaise, night sweats or weight loss    Psychological:   Denies anxiety, disorientation or hallucinations    ENT:    Denies epistaxis, headaches, vertigo or visual changes    Cardiovascular:   Denies any chest pain, irregular heartbeats, or palpitations. No paroxysmal nocturnal dyspnea. Respiratory:   Denies shortness of breath, coughing, sputum production, hemoptysis, or wheezing.   No orthopnea. Gastrointestinal:   Denies nausea, vomiting, diarrhea, or constipation. Denies any abdominal pain. Denies change in bowel habits or stools. Genito-Urinary:    Denies any urgency, frequency, hematuria. Voiding without difficulty. Musculoskeletal:   Denies joint pain, joint stiffness, joint swelling or muscle pain    Neurology:    Denies any headache or focal neurological deficits. No weakness or paresthesia. Derm:    Denies any rashes, ulcers, or excoriations. Denies bruising. Extremities:   Denies any lower extremity swelling or edema. PHYSICAL EXAM: Abnormal findings noted  VITALS:  Vitals:    02/09/23 0943   BP:    Pulse: 71   Resp: 20   Temp:    SpO2: 97%         CONSTITUTIONAL:    Awake, alert, cooperative, no apparent distress, and appears stated age    Patient is intubated and sedated    EYES:    sclera clear, conjunctiva normal    ENT:    Normocephalic, atraumatic,  External ears without lesions. Patient has OG tube and ET tube in place    NECK:    Supple, symmetrical, trachea midline,no JVD    HEMATOLOGIC/LYMPHATICS:    No cervical lymphadenopathy and no supraclavicular lymphadenopathy    LUNGS:    coarse decreased breath sounds to auscultation bilaterally, no wheezes, rhonchi, or rales,     CARDIOVASCULAR:    Normal apical impulse, regular rate and rhythm, normal S1 and S2, no S3 or S4, and no murmur noted    ABDOMEN:    , soft, non-distended, non-tender, morbidly obese    MUSCULOSKELETAL:    There is no redness, warmth, or swelling of the joints. NEUROLOGIC:    Awake, alert, oriented to name, place and time. Patient currently intubated and sedated    SKIN:    No bruising or bleeding. No redness, warmth, or swelling    EXTREMITIES:    Peripheral pulses present. No edema, cyanosis, or swelling.   Patient has bilateral lower extremity edema    LINES/CATHETERS   Patient has right IJ CVC  Whitaker catheter in place    LABORATORY DATA:  CBC with Differential: Lab Results   Component Value Date/Time    WBC 16.7 02/09/2023 04:22 AM    RBC 4.44 02/09/2023 04:22 AM    HGB 13.6 02/09/2023 04:22 AM    HCT 43.6 02/09/2023 04:22 AM     02/09/2023 04:22 AM    MCV 98.2 02/09/2023 04:22 AM    MCH 30.6 02/09/2023 04:22 AM    MCHC 31.2 02/09/2023 04:22 AM    RDW 13.2 02/09/2023 04:22 AM    SEGSPCT 73 03/12/2014 08:10 AM    LYMPHOPCT 4.3 02/09/2023 04:22 AM    MONOPCT 2.6 02/09/2023 04:22 AM    EOSPCT 2 10/21/2010 10:25 AM    BASOPCT 0.1 02/09/2023 04:22 AM    MONOSABS 0.44 02/09/2023 04:22 AM    LYMPHSABS 0.72 02/09/2023 04:22 AM    EOSABS 0.00 02/09/2023 04:22 AM    BASOSABS 0.02 02/09/2023 04:22 AM     CMP:    Lab Results   Component Value Date/Time     02/09/2023 04:22 AM    K 4.2 02/09/2023 04:22 AM    K 4.1 02/08/2023 02:33 PM     02/09/2023 04:22 AM    CO2 28 02/09/2023 04:22 AM    BUN 16 02/09/2023 04:22 AM    CREATININE 1.0 02/09/2023 04:22 AM    GFRAA >60 07/31/2019 09:44 AM    LABGLOM >60 02/09/2023 04:22 AM    GLUCOSE 195 02/09/2023 04:22 AM    GLUCOSE 99 05/30/2012 08:37 AM    PROT 7.7 02/08/2023 02:33 PM    LABALBU 4.3 02/08/2023 02:33 PM    LABALBU 4.2 05/30/2012 08:37 AM    CALCIUM 8.1 02/09/2023 04:22 AM    BILITOT 0.4 02/08/2023 02:33 PM    ALKPHOS 115 02/08/2023 02:33 PM    AST 9 02/08/2023 02:33 PM    ALT 12 02/08/2023 02:33 PM       ASSESSMENT/PLAN:  Acute hypoxemic and hypercapnic respiratory failure requiring mechanical ventilation  Exacerbation of COPD with current tobacco abuse  Encephalopathy rule out meningitis  Community-acquired pneumonia  History of multiple sclerosis since age 28  Hypertension  Hyperlipidemia  Obesity hypoventilation syndrome  History of alcohol and drug abuse  Morbid obesity with BMI 56.60 kg/m²  Hypothyroidism      Patient presented with altered mental status. Patient became increasingly more confused and agitated. Patient was intubated in the ED. There is concern for meningitis.   However lumbar puncture was unable to be performed. Patient placed on acyclovir, meropenem, vancomycin. Patient is immunocompromise in the setting of Tecfidera for his MS. Cultures ordered. Consider ID consultation. IR has been consulted for lumbar puncture. Critical care consulted and patient accepted to ICU. Further evaluation and management per critical care. Patient is [facility] pending [facility] availability.    -Home medication reviewed  -N.p.o. with patient intubated  -Lactated Ringer's at 75 cc an hour  -DuoNeb aerosols 4 times daily  -Pulmicort aerosol twice daily    -BMP, CBC in a.m.     Manning Harada, DO  10:47 AM  2/9/2023

## 2023-02-09 NOTE — PROGRESS NOTES
Pharmacy Consultation Note  (Antibiotic Dosing and Monitoring)    Initial consult date: 02/08/2023  Consulting physician/provider: Rice  Drug: Vancomycin  Indication: Central Nervous System Infection     Age/  Gender Height Weight IBW  Allergy Information   57 y.o./male   (!) 380 lb (172.4 kg)     Ideal body weight: 73 kg (160 lb 15 oz)  Adjusted ideal body weight: 112.7 kg (248 lb 9 oz)   Bactrim [sulfamethoxazole-trimethoprim], Penicillins, and Sulfa antibiotics      Renal Function:  Recent Labs     02/08/23  1433   BUN 19   CREATININE 1.2       Intake/Output Summary (Last 24 hours) at 2/8/2023 2332  Last data filed at 2/8/2023 2236  Gross per 24 hour   Intake 1541.1 ml   Output --   Net 1541.1 ml       Vancomycin Monitoring:  Trough:  No results for input(s): VANCOTROUGH in the last 72 hours. Random:  No results for input(s): VANCORANDOM in the last 72 hours. No results for input(s): Ari Jeana in the last 72 hours. Historical Cultures:  No results found for: ORG  No results for input(s): BC in the last 72 hours. Vancomycin Administration Times:  Recent vancomycin administrations                     vancomycin (VANCOCIN) 3,000 mg in sodium chloride 0.9 % 500 mL IVPB (mg) 3,000 mg New Bag 02/08/23 2004                    Assessment:  Patient is a 65 y.o. male who has been initiated on vancomycin  Estimated Creatinine Clearance: 110 mL/min (based on SCr of 1.2 mg/dL). To dose vancomycin, pharmacy will be utilizing Recurve calculation software for goal AUC/BEAU 400-600 mg/L-hr    Plan:   Will check vancomycin levels when appropriate  Will continue to monitor renal function   Pharmacy to follow      Jess Camara [facility] 2/8/2023 11:32 PM

## 2023-02-09 NOTE — PROGRESS NOTES
Comprehensive Nutrition Assessment    Type and Reason for Visit:  Initial, NPO/Clear Liquid (vent)    Nutrition Recommendations/Plan:   Patient currently receiving ~1300 lipid calories from propofol. Please consult for TF recommendations as needed. Will continue to monitor. Malnutrition Assessment:  Malnutrition Status: At risk for malnutrition (Comment) (02/09/23 0800)    Context:  Chronic Illness     Findings of the 6 clinical characteristics of malnutrition:  Energy Intake:  Unable to assess (vent)  Weight Loss:  Unable to assess     Body Fat Loss:  No significant body fat loss     Muscle Mass Loss:  No significant muscle mass loss    Fluid Accumulation:  No significant fluid accumulation     Strength:  Not Performed    Nutrition Assessment:    Pt adm d/t AMS w/ resp failure, currently on vent support 2/2 COPD exacerbation/CAP, encephalopathy r/o - meningitis. Hx poly substance abuse, MS. Note transfer to [facility] when bed available, will continue to monitor. Nutrition Related Findings:    intubated/sedated, propofol 51.7ml/hr = 1365 lipid calories, OG clamped, hypo bs, distended abd, lymphedema, MAP WNL, +I/Os   Wound Type: None       Current Nutrition Intake & Therapies:    Average Meal Intake: NPO  Average Supplements Intake: NPO  Diet NPO    Anthropometric Measures:  Height: 5' 10\" (177.8 cm)  Ideal Body Weight (IBW): 166 lbs (75 kg)    Admission Body Weight: 394 lb 8 oz (178.9 kg) (2/8 actual)  Current Body Weight: 394 lb 8 oz (178.9 kg) (2/9 actual), 237.7 % IBW.     Current BMI (kg/m2): 56.6  Usual Body Weight:  (lack measured wt hx per EMR)  Weight Adjustment For: No Adjustment  BMI Categories: Obese Class 3 (BMI 40.0 or greater)    Estimated Daily Nutrient Needs:  Energy Requirements Based On: Formula  Weight Used for Energy Requirements: Current  Energy (kcal/day): 2899-2805 (80% = 9725-6780)  Weight Used for Protein Requirements: Ideal  Protein (g/day): 150-170  Fluid (ml/day): per critical care    Nutrition Diagnosis:   Inadequate oral intake related to impaired respiratory function as evidenced by intubation, NPO or clear liquid status due to medical condition    Nutrition Interventions:   Food and/or Nutrient Delivery: Continue NPO  Nutrition Education/Counseling: No recommendation at this time  Coordination of Nutrition Care: Continue to monitor while inpatient    Goals:  Goals: other (specify)  Specify Other Goals: Nutrition progression    Nutrition Monitoring and Evaluation:   Behavioral-Environmental Outcomes: None Identified  Food/Nutrient Intake Outcomes: Diet Advancement/Tolerance  Physical Signs/Symptoms Outcomes: Biochemical Data, Nutrition Focused Physical Findings, Skin, Weight, GI Status, Fluid Status or Edema, Hemodynamic Status    Discharge Planning:     Too soon to determine     Ced Clay2, MS, RD, LD  Contact: [phone]

## 2023-02-09 NOTE — H&P
Department of Internal Medicine  History and Physical    PCP: Radha Montes De Oca DO  Admitting Physician: Dr. Anthony Wheeler  Consultants:   Date of Service: 2/8/2023    CHIEF COMPLAINT: Altered mental status    HISTORY OF PRESENT ILLNESS:    Patient is 52-year-old male who presented to the ED with altered mental status. HPI obtained from staff and chart review. Patient lives in the basement apparently. Mother stated that she has a room overhead where her son stays. States that she heard her son talking nonsensically and repeating phrases. She confronted him and tried to assist him. Later in the day patient went to the bathroom however did not come out and mother became concerned and called EMS. .  On presentation patient was able to answer questions although confused. patient was intubated due to protection of airway and need for further evaluation in the setting of agitation. PAST MEDICAL Hx:  Past Medical History:   Diagnosis Date    Dermatophytosis 1/12/2023    Hyperlipidemia     Hypertension     Leg pain     Leg swelling 1/12/2023    Lymphedema     Lymphedema of both lower extremities 1/12/2023    MS (multiple sclerosis) ([facility] [address]     Multiple sclerosis ([facility] [address]     Multiple sclerosis ([facility] [address] 1/12/2023    With significant weakness of both legs follows up with [facility]    Spinal stenosis, lumbar     Thyroid disease     Tinea pedis of both feet 1/12/2023       PAST SURGICAL Hx:   Past Surgical History:   Procedure Laterality Date    HERNIA REPAIR      TONSILLECTOMY         FAMILY Hx:  No family history on file. HOME MEDICATIONS:  Prior to Admission medications    Medication Sig Start Date End Date Taking?  Authorizing Provider   miconazole nitrate 2 % OINT Apply topically 2 times daily Apply to the toes in between the toes both feet and both calfs up to the knee twice a day for 1 month 1/19/23   Amor Victor MD   bumetanide (BUMEX) 1 MG tablet Take 1 mg by mouth daily    Historical Provider, MD levothyroxine (SYNTHROID) 175 MCG tablet Take 175 mcg by mouth Daily    Historical Provider, MD   spironolactone (ALDACTONE) 25 MG tablet Take 25 mg by mouth daily    Historical Provider, MD   pramipexole (MIRAPEX) 0.125 MG tablet Take 0.125 mg by mouth 3 times daily    Historical Provider, MD   baclofen (LIORESAL) 20 MG tablet Take 20 mg by mouth 3 times daily    Historical Provider, MD   metFORMIN (GLUCOPHAGE) 500 MG tablet Take 500 mg by mouth 2 times daily (with meals)    Historical Provider, MD   metoprolol tartrate (LOPRESSOR) 25 MG tablet Take 25 mg by mouth 2 times daily    Historical Provider, MD   terbinafine (LAMISIL) 250 MG tablet Take 1 tablet by mouth daily 1/12/23 2/11/23  Anneliese Drummond MD   oxyCODONE-acetaminophen (PERCOCET)  MG per tablet Take 1 tablet by mouth every 4 hours as needed for Pain. Historical Provider, MD   ipratropium (ATROVENT) 0.02 % nebulizer solution Take 2.5 mLs by nebulization 4 times daily 12/16/18   Sunny Odom MD   Dimethyl Fumarate (TECFIDERA PO) Take  by mouth 2 times daily. Historical Provider, MD   gabapentin (NEURONTIN) 300 MG capsule Take 600 mg by mouth 2 times daily. Historical Provider, MD   albuterol (PROVENTIL HFA) 108 (90 BASE) MCG/ACT inhaler Inhale 2 puffs into the lungs every 6 hours as needed for Wheezing for 7 days. 1/1/14 1/8/14  Sunny Odom MD   vitamin E 1000 UNITS capsule Take 1,000 Units by mouth daily. Historical Provider, MD   potassium chloride (KLOR-CON) 10 MEQ CR tablet Take 10 mEq by mouth daily. Historical Provider, MD   furosemide (LASIX) 20 MG tablet Take 20 mg by mouth daily. Historical Provider, MD   Cholecalciferol (VITAMIN D3) 5000 UNITS CAPS Take  by mouth daily. Historical Provider, MD   loratadine (CLARITIN) 10 MG tablet Take 10 mg by mouth daily.       Historical Provider, MD       ALLERGIES:  Bactrim [sulfamethoxazole-trimethoprim], Penicillins, and Sulfa antibiotics    SOCIAL Hx:  Social History     Socioeconomic History    Marital status:      Spouse name: Not on file    Number of children: Not on file    Years of education: Not on file    Highest education level: Not on file   Occupational History    Not on file   Tobacco Use    Smoking status: Every Day     Packs/day: 1.00     Types: Cigarettes    Smokeless tobacco: Never   Substance and Sexual Activity    Alcohol use: Yes     Comment: rarely    Drug use: Not Currently     Types: Marijuana Annita Legions)     Comment: edible    Sexual activity: Not on file   Other Topics Concern    Not on file   Social History Narrative    ** Merged History Encounter **          Social Determinants of Health     Financial Resource Strain: Not on file   Food Insecurity: Not on file   Transportation Needs: Not on file   Physical Activity: Not on file   Stress: Not on file   Social Connections: Not on file   Intimate Partner Violence: Not on file   Housing Stability: Not on file       ROS: Positive in bold  General:   Denies chills, fatigue, fever, malaise, night sweats or weight loss    Psychological:   Denies anxiety, disorientation or hallucinations    ENT:    Denies epistaxis, headaches, vertigo or visual changes    Cardiovascular:   Denies any chest pain, irregular heartbeats, or palpitations. No paroxysmal nocturnal dyspnea. Respiratory:   Denies shortness of breath, coughing, sputum production, hemoptysis, or wheezing. No orthopnea. Gastrointestinal:   Denies nausea, vomiting, diarrhea, or constipation. Denies any abdominal pain. Denies change in bowel habits or stools. Genito-Urinary:    Denies any urgency, frequency, hematuria. Voiding without difficulty. Musculoskeletal:   Denies joint pain, joint stiffness, joint swelling or muscle pain    Neurology:    Denies any headache or focal neurological deficits. No weakness or paresthesia. Derm:    Denies any rashes, ulcers, or excoriations. Denies bruising.       Extremities:   Denies any lower extremity swelling or edema. PHYSICAL EXAM: Abnormal findings noted  VITALS:  Vitals:    02/08/23 2110   BP: (!) 157/83   Pulse: 70   Resp: 20   Temp:    SpO2: 100%         CONSTITUTIONAL:    Awake, alert, cooperative, no apparent distress, and appears stated age  Patient is intubated and sedated    EYES:    sclera clear, conjunctiva normal    ENT:    Normocephalic, atraumatic,  External ears without lesions. Patient has OG tube and ET tube in place    NECK:    Supple, symmetrical, trachea midline,no JVD    HEMATOLOGIC/LYMPHATICS:    No cervical lymphadenopathy and no supraclavicular lymphadenopathy    LUNGS:    Symmetric. No increased work of breathing, good air exchange, clear to auscultation bilaterally, no wheezes, rhonchi, or rales,     CARDIOVASCULAR:    Normal apical impulse, regular rate and rhythm, normal S1 and S2, no S3 or S4, and no murmur noted    ABDOMEN:    , soft, non-distended, non-tender    MUSCULOSKELETAL:    There is no redness, warmth, or swelling of the joints. NEUROLOGIC:    Awake, alert, oriented to name, place and time. Patient currently intubated and sedated    SKIN:    No bruising or bleeding. No redness, warmth, or swelling    EXTREMITIES:    Peripheral pulses present. No edema, cyanosis, or swelling.   Patient has bilateral lower extremity edema    LINES/CATHETERS   Patient has right IJ CVC  Whitaker catheter in place    LABORATORY DATA:  CBC with Differential:    Lab Results   Component Value Date/Time    WBC 14.2 02/08/2023 02:33 PM    RBC 5.11 02/08/2023 02:33 PM    HGB 15.7 02/08/2023 02:33 PM    HCT 48.7 02/08/2023 02:33 PM     02/08/2023 02:33 PM    MCV 95.3 02/08/2023 02:33 PM    MCH 30.7 02/08/2023 02:33 PM    MCHC 32.2 02/08/2023 02:33 PM    RDW 12.9 02/08/2023 02:33 PM    SEGSPCT 73 03/12/2014 08:10 AM    LYMPHOPCT 7.2 02/08/2023 02:33 PM    MONOPCT 5.6 02/08/2023 02:33 PM    EOSPCT 2 10/21/2010 10:25 AM    BASOPCT 0.6 02/08/2023 02:33 PM    DEVINABS 0.79 02/08/2023 02:33 PM    LYMPHSABS 1.03 02/08/2023 02:33 PM    EOSABS 0.08 02/08/2023 02:33 PM    BASOSABS 0.09 02/08/2023 02:33 PM     CMP:    Lab Results   Component Value Date/Time     02/08/2023 02:33 PM    K 4.1 02/08/2023 02:33 PM     02/08/2023 02:33 PM    CO2 27 02/08/2023 02:33 PM    BUN 19 02/08/2023 02:33 PM    CREATININE 1.2 02/08/2023 02:33 PM    GFRAA >60 07/31/2019 09:44 AM    LABGLOM >60 02/08/2023 02:33 PM    GLUCOSE 173 02/08/2023 02:33 PM    GLUCOSE 99 05/30/2012 08:37 AM    PROT 7.7 02/08/2023 02:33 PM    LABALBU 4.3 02/08/2023 02:33 PM    LABALBU 4.2 05/30/2012 08:37 AM    CALCIUM 9.4 02/08/2023 02:33 PM    BILITOT 0.4 02/08/2023 02:33 PM    ALKPHOS 115 02/08/2023 02:33 PM    AST 9 02/08/2023 02:33 PM    ALT 12 02/08/2023 02:33 PM       ASSESSMENT/PLAN:  Acute hypoxemic and hypercapnic respiratory failure requiring mechanical ventilation  Exacerbation of COPD  Encephalopathy rule out meningitis  Community-acquired pneumonia  History of multiple sclerosis since age 28  Hypertension  Hyperlipidemia  Obesity hypoventilation syndrome  History of alcohol and drug abuse  Current tobacco abuse  Morbid obesity with BMI 56.60 kg/m²      Patient presented with altered mental status. Patient became increasingly more confused and agitated. Patient was intubated in the ED. There is concern for meningitis. However lumbar puncture was unable to be performed. Patient placed on acyclovir, meropenem, vancomycin. Patient is immunocompromise in the setting of Tecfidera for his MS. Cultures ordered. Consider ID consultation. IR has been consulted for lumbar puncture. Critical care consulted and patient accepted to ICU. Further evaluation and management per critical care. Patient is awaiting transfer to [facility] pending bed availability.       Marie Jimenez [place]  9:14 PM  2/8/2023    Electronically signed by Marie Jimenez DO on 2/8/23 at 9:14 PM EST

## 2023-02-09 NOTE — CONSULTS
Assessment:  Acute hypoxemic and hypercapnic respiratory failure requiring mechanical ventilation  Exacerbation of COPD  Encephalopathy rule out meningitis  Community-acquired pneumonia  Obesity hypoventilation syndrome  History of multiple sclerosis since age 28  History of alcohol and drug abuse  Morbid obesity with BMI 56.60 kg/m²    Plan :   Mechanical ventilation with protective lung strategy  Continues Zovirax, Merrem, and vancomycin  Start Solu-Cortef 50 mg every 8 hours  Blood cultures pending  Check sputum culture  Check respiratory molecular panel  Check procalcitonin  Check for Legionella and strep pneumonia  Check echocardiogram  Lumbar puncture by interventional radiology tomorrow  Hold anticoagulation until after lumbar puncture  SCDs for DVT prophylaxis  Start Pulmicort and DuoNeb aerosol  Transfer to [facility] when bed comes available      History of Present Illness:   Patient is a 41-year-old male with a past medical history of hyperlipidemia, hypertension, lymphedema, multiple sclerosis, thyroid disease, alcohol abuse, drug abuse and lumbar spinal stenosis. He presented to the emergency department for altered mental status. The patient lives in his mother's house and stays in the basement. This morning around 5 AM he woke up his mother by talking very loudly she went down to investigate and he was acting strangely and talking gibberish. Later that morning he went into the bathroom and did not come out and she got concerned and called EMS. EMS did administer Narcan due to his altered mental status but there was no change in his mental status. On arrival to the emergency department he is alert and oriented to self and place only but able to make some sense but his condition had declined which required intubation to protect his airway. Arterial blood gases were drawn that showed a pH of 7.33, PCO2 55.9, HCO3 of 29.6, and a PO2 of 98.   Urine drug screen was positive for opiates but he does take prescribed Percocet at home. Ethanol level less than 10. his WBCs were slightly elevated at 14.2. Urinalysis was unremarkable. CAT scan of the head was unremarkable. CAT scan of the chest showed right lung atelectasis. Chest x-ray showed no acute process mild cardiomegaly. Upon review there is dense consolidation on the right lower lobe. February 8, 2023:  Patient is seen and examined at the bedside in the emergency department. He is currently intubated and sedated with fentanyl and propofol. His mother and his daughter is at the bedside. [facility] but there are no beds currently available. We will continue antibiotic therapy to cover central nervous system infection and community-acquired pneumonia. Past Medical History:  Past Medical History:   Diagnosis Date    Dermatophytosis 1/12/2023    Hyperlipidemia     Hypertension     Leg pain     Leg swelling 1/12/2023    Lymphedema     Lymphedema of both lower extremities 1/12/2023    MS (multiple sclerosis) ([address]     Multiple sclerosis ([address]     Multiple sclerosis ([address] 1/12/2023    With significant weakness of both legs [facility]    Spinal stenosis, lumbar     Thyroid disease     Tinea pedis of both feet 1/12/2023      Past Surgical History:   Procedure Laterality Date    HERNIA REPAIR      TONSILLECTOMY         Family History:   @[facility]@    Allergies:         Bactrim [sulfamethoxazole-trimethoprim], Penicillins, and Sulfa antibiotics    Social history:  Social History     Socioeconomic History    Marital status:      Spouse name: Not on file    Number of children: Not on file    Years of education: Not on file    Highest education level: Not on file   Occupational History    Not on file   Tobacco Use    Smoking status: Every Day     Packs/day: 1.00     Types: Cigarettes    Smokeless tobacco: Never   Substance and Sexual Activity    Alcohol use:  Yes Comment: rarely    Drug use: Not Currently     Types: Marijuana Alfornia Jaun)     Comment: edible    Sexual activity: Not on file   Other Topics Concern    Not on file   Social History Narrative    ** Merged History Encounter **          Social Determinants of Health     Financial Resource Strain: Not on file   Food Insecurity: Not on file   Transportation Needs: Not on file   Physical Activity: Not on file   Stress: Not on file   Social Connections: Not on file   Intimate Partner Violence: Not on file   Housing Stability: Not on file       Current Medications:     Current Facility-Administered Medications:     propofol injection, 5-50 mcg/kg/min, IntraVENous, Continuous, Michela Shell, DO, Last Rate: 51.7 mL/hr at 02/08/23 1918, 50 mcg/kg/min at 02/08/23 1918    vancomycin (VANCOCIN) 3,000 mg in sodium chloride 0.9 % 500 mL IVPB, 3,000 mg, IntraVENous, Once, Michela Shell, DO, Last Rate: 166.7 mL/hr at 02/08/23 2004, 3,000 mg at 02/08/23 2004    0.9 % sodium chloride infusion, , IntraVENous, Once, Michela Shell, DO    sodium chloride flush 0.9 % injection 5-40 mL, 5-40 mL, IntraVENous, 2 times per day, Michela Shell, DO    sodium chloride flush 0.9 % injection 5-40 mL, 5-40 mL, IntraVENous, PRN, Michela Shell, DO    0.9 % sodium chloride infusion, , IntraVENous, PRN, Michela Shell, DO    fentaNYL 10 mcg/ml in 0.9%  ml infusion,  mcg/hr, IntraVENous, Continuous, Michela Shell, DO, Last Rate: 7.5 mL/hr at 02/08/23 2029, 75 mcg/hr at 02/08/23 2029    Review of Systems:   Unable to perform due to patient intubated and sedated    Physical Exam:   Vital Signs:  BP 95/61   Pulse 67   Temp 98.4 °F (36.9 °C) (Axillary)   Resp 19   Wt (!) 380 lb (172.4 kg)   SpO2 97%   BMI 54.52 kg/m²     Input/Output:   In: 1100   Out: -     Oxygen requirements: 50%    Ventilator Information:  Vent Information  Ventilator Initiate: Yes  Vent Mode: AC/VC    General appearance: Intubated and sedated   HEENT: Atraumatic/normocephalic,  PATSY, pharynx clear, dry mucosa   Neck: Supple, no jugular venous distension, lymphadenopathy, thyromegaly or carotid bruits  Chest: Diminished bilateral, no wheezing, no crackles and no tenderness over ribs   Cardiovascular: Normal S1 , S2, regular rate and rhythm, no murmur, rub or gallop  Abdomen: Obese normal sounds present, soft,no hepatosplenomegaly, and no masses  Extremities: 2-3+ edema bilateral lower extremities. Pulses are equally present. Skin: intact, no rashes   Neurologic: Intubated and sedated  Investigations:  Labs, radiological imaging and cardiac work up were reviewed        ICU NURSE PRACTITIONER NOTE OF PERSONAL INVOLVEMENT IN CARE  As the nurse practitioner, I certify that I personally reviewed the patient's history and personally examined the patient to confirm the physical findings described above, and that I reviewed the relevant imaging studies and available reports. I also discussed the differential diagnosis and all of the proposed management plans with the patient and individuals accompanying the patient to this visit. They had the opportunity to ask questions about the proposed management plans and to have those questions answered. This patient has a high probability of sudden, clinically significant deterioration, which requires the highest level of  preparedness to intervene urgently. I managed/supervised life or organ supporting interventions that required frequent  assessment. I devoted my full attention to the direct care of this patient for the amount of time indicated below. Time I spent with family or surrogate(s) is included only if the patient was incapable of providing the necessary information or participating in medical decision-making. Time devoted to teaching and to any procedures I billed separately is not included.     Critical Care Time: 42 minutes      Electronically signed by MASHA Watkins CNP on 2/8/2023 at 10:11 PM

## 2023-02-09 NOTE — PROGRESS NOTES
Per the patient's son, the pt started Prednisone 10 mg on Monday. (12 tabs in prescription with 7 remaining.) Family states that the patient becomes very agitated & aggressive when he takes this medication.     Karen Sepulveda RN  2/9/2023

## 2023-02-09 NOTE — ACP (ADVANCE CARE PLANNING)
Advance Care Planning     Advance Care Planning Activator (Inpatient)  Conversation Note      Date of ACP Conversation: 2/9/2023     Conversation Conducted with: Patient with Decision Making Capacity   Healthcare Decision Maker: Next of Kin by law (only applies in absence of above) (name) Tello Portillo daughter    ACP Activator: Tomas Carbone RN  Health Care Decision Maker:     Current Designated Health Care Decision Maker:     Primary Decision Maker: Rhys Linn - Child - [phone]    Primary Decision Maker: Marlon [facility] - [phone]    Secondary Decision Maker: Yamilet Tao - Parent - [phone]    Today we documented Decision Maker(s) consistent with Legal Next of Kin hierarchy. Care Preferences    Ventilation: \"If you were in your present state of health and suddenly became very ill and were unable to breathe on your own, what would your preference be about the use of a ventilator (breathing machine) if it were available to you? \"      Would the patient desire the use of ventilator (breathing machine)?: yes    \"If your health worsens and it becomes clear that your chance of recovery is unlikely, what would your preference be about the use of a ventilator (breathing machine) if it were available to you? \"     Would the patient desire the use of ventilator (breathing machine)?: Yes      Resuscitation  \"CPR works best to restart the heart when there is a sudden event, like a heart attack, in someone who is otherwise healthy. Unfortunately, CPR does not typically restart the heart for people who have serious health conditions or who are very sick. \"    \"In the event your heart stopped as a result of an underlying serious health condition, would you want attempts to be made to restart your heart (answer \"yes\" for attempt to resuscitate) or would you prefer a natural death (answer \"no\" for do not attempt to resuscitate)? \" yes       [x] Yes   [] No   Educated Patient / Decision Maker regarding differences between Advance Directives and portable DNR orders.     Length of ACP Conversation in minutes:  20    Conversation Outcomes:  [x] ACP discussion completed  [] Existing advance directive reviewed with patient; no changes to patient's previously recorded wishes  [] New Advance Directive completed  [] Portable Do Not Rescitate prepared for Provider review and signature  [] POLST/POST/MOLST/MOST prepared for Provider review and signature      Follow-up plan:    [] Schedule follow-up conversation to continue planning  [] Referred individual to Provider for additional questions/concerns   [] Advised patient/agent/surrogate to review completed ACP document and update if needed with changes in condition, patient preferences or care setting    [x] This note routed to one or more involved healthcare providers      Electronically signed by VINITA Zamudio on 2/9/2023 at 11:24 AM

## 2023-02-09 NOTE — PLAN OF CARE
Problem: Discharge Planning  Goal: Discharge to home or other facility with appropriate resources  Outcome: Progressing     Problem: Pain  Goal: Verbalizes/displays adequate comfort level or baseline comfort level  Outcome: Progressing     Problem: Safety - Medical Restraint  Goal: Remains free of injury from restraints (Restraint for Interference with Medical Device)  Description: INTERVENTIONS:  1. Determine that other, less restrictive measures have been tried or would not be effective before applying the restraint  2. Evaluate the patient's condition at the time of restraint application  3. Inform patient/family regarding the reason for restraint  4.  Q2H: Monitor safety, psychosocial status, comfort, nutrition and hydration  Outcome: Progressing  Flowsheets (Taken 2/9/2023 0245)  Remains free of injury from restraints (restraint for interference with medical device): Every 2 hours: Monitor safety, psychosocial status, comfort, nutrition and hydration

## 2023-02-09 NOTE — PROGRESS NOTES
Pulmonary/Critical Care Progress Note    We are following patient for acute hypoxemic and hypercapnic respiratory failure, bilateral lower lobe infiltrates consistent with pneumonia, severe COPD, nicotine addiction (patient smokes 2 packs/day), multiple sclerosis, history of alcohol and drug abuse, morbid obesity, untreated obstructive sleep apnea, obesity hypoventilation syndrome, encephalopathy appears to be clearing, history of hypothyroidism    SUBJECTIVE:  The patient responded to simple commands this morning without difficulty when a sedation vacation was given. The patient's CT shows bilateral lower lobe infiltrates which are consistent with pneumonia especially in a heavy cigarette smoker who is largely immobile. We will continue with azithromycin and meropenem as well as vancomycin. He is well sedated currently on propofol. GI bleed and DVT/PE prophylaxis are being administered via Protonix and Lovenox, respectively. Aerosolized bronchodilators and inhaled steroids have been obtained. We have sent a sputum this morning for Gram stain CNS and are awaiting for results. We will initiate tube feedings for nutritional support, along with water flushes. Interventional radiology is unable to do an LP because her longest needle is not long enough. The family is aware of this. They are also somewhat less enthusiastic about transferring the patient to [place]. Index of suspicion for meningitis is low, especially with the patient's ability to respond appropriately to verbal commands this morning despite being intubated.       MEDICATIONS:   sodium chloride flush  5-40 mL IntraVENous 2 times per day    azithromycin  500 mg IntraVENous Q24H    levothyroxine  175 mcg Oral Daily    gabapentin  600 mg Oral BID    metoprolol tartrate  25 mg Oral BID    pramipexole  0.125 mg Oral TID    baclofen  20 mg Oral TID    enoxaparin  60 mg SubCUTAneous BID    ipratropium-albuterol  1 ampule Inhalation Q4H    sodium chloride flush  5-40 mL IntraVENous 2 times per day    acyclovir  10 mg/kg (Ideal) IntraVENous Q8H    pantoprazole (PROTONIX) 40 mg injection  40 mg IntraVENous Daily    meropenem  2,000 mg IntraVENous Q8H    budesonide  0.5 mg Nebulization BID    vancomycin  1,000 mg IntraVENous Q12H      propofol 50 mcg/kg/min (02/09/23 0752)    dextrose      sodium chloride      sodium chloride      fentaNYL 100 mcg/hr (02/09/23 0816)    lactated ringers IV soln 75 mL/hr at 02/09/23 1142     perflutren lipid microspheres, glucose, dextrose bolus **OR** dextrose bolus, glucagon (rDNA), dextrose, sodium chloride flush, sodium chloride, polyethylene glycol, acetaminophen **OR** acetaminophen, sodium chloride flush, sodium chloride      REVIEW OF SYSTEMS:    Unable to answer questions because the patient is intubated and sedated    OBJECTIVE:  Vitals:    02/09/23 0943   BP:    Pulse: 71   Resp: 20   Temp:    SpO2: 97%     FiO2 : 55 %  O2 Flow Rate (L/min): 94 L/min  O2 Device: Ventilator    PHYSICAL EXAM:  Constitutional: Low-grade fever (99.1, no chills, no diaphoresis  Skin: No skin rash, no skin breakdown  HEENT: Endotracheal tube secured at lips  Neck: Neck circumference, no JVD or lymphadenopathy  Cardiovascular: S1, S2 regular. No S3 murmurs or rubs present  Respiratory: Low pitched wheezing anteriorly.   Fine crackles posteriorly either side  Gastrointestinal: Soft, obese, nontender  Genitourinary: No grossly bloody urine  Extremities: 1+ edema feet legs and ankles  Neurological: Sedated  Psychological: Sedated    LABS:  WBC   Date Value Ref Range Status   02/09/2023 16.7 (H) 4.5 - 11.5 E9/L Final   02/08/2023 14.2 (H) 4.5 - 11.5 E9/L Final   07/31/2019 14.6 (H) 4.5 - 11.5 E9/L Final     Hemoglobin   Date Value Ref Range Status   02/09/2023 13.6 12.5 - 16.5 g/dL Final   02/08/2023 15.7 12.5 - 16.5 g/dL Final   07/31/2019 17.4 (H) 12.5 - 16.5 g/dL Final     Hematocrit   Date Value Ref Range Status   02/09/2023 43.6 37.0 - 54.0 % Final   02/08/2023 48.7 37.0 - 54.0 % Final   07/31/2019 52.4 37.0 - 54.0 % Final     MCV   Date Value Ref Range Status   02/09/2023 98.2 80.0 - 99.9 fL Final   02/08/2023 95.3 80.0 - 99.9 fL Final   07/31/2019 96.0 80.0 - 99.9 fL Final     Platelets   Date Value Ref Range Status   02/09/2023 343 130 - 450 E9/L Final   02/08/2023 401 130 - 450 E9/L Final   07/31/2019 368 130 - 450 E9/L Final     Sodium   Date Value Ref Range Status   02/09/2023 142 132 - 146 mmol/L Final   02/08/2023 143 132 - 146 mmol/L Final   07/31/2019 142 132 - 146 mmol/L Final     Potassium   Date Value Ref Range Status   02/09/2023 4.2 3.5 - 5.0 mmol/L Final   07/31/2019 4.6 3.5 - 5.0 mmol/L Final   04/04/2019 3.7 3.5 - 5.0 mmol/L Final     Potassium reflex Magnesium   Date Value Ref Range Status   02/08/2023 4.1 3.5 - 5.0 mmol/L Final   12/10/2018 3.6 3.5 - 5.0 mmol/L Final     Chloride   Date Value Ref Range Status   02/09/2023 106 98 - 107 mmol/L Final   02/08/2023 102 98 - 107 mmol/L Final   07/31/2019 99 98 - 107 mmol/L Final     CO2   Date Value Ref Range Status   02/09/2023 28 22 - 29 mmol/L Final   02/08/2023 27 22 - 29 mmol/L Final   07/31/2019 21 (L) 22 - 29 mmol/L Final     BUN   Date Value Ref Range Status   02/09/2023 16 6 - 20 mg/dL Final   02/08/2023 19 6 - 20 mg/dL Final   07/31/2019 19 6 - 20 mg/dL Final     Creatinine   Date Value Ref Range Status   02/09/2023 1.0 0.7 - 1.2 mg/dL Final   02/08/2023 1.2 0.7 - 1.2 mg/dL Final   07/31/2019 1.0 0.7 - 1.2 mg/dL Final     Glucose   Date Value Ref Range Status   02/09/2023 195 (H) 74 - 99 mg/dL Final   02/08/2023 173 (H) 74 - 99 mg/dL Final   07/31/2019 127 (H) 74 - 99 mg/dL Final   05/30/2012 99 70 - 110 mg/dL Final   04/04/2012 89 70 - 110 mg/dL Final   01/13/2012 87 70 - 110 mg/dL Final     Calcium   Date Value Ref Range Status   02/09/2023 8.1 (L) 8.6 - 10.2 mg/dL Final   02/08/2023 9.4 8.6 - 10.2 mg/dL Final   07/31/2019 9.3 8.6 - 10.2 mg/dL Final     Total Protein   Date Value Ref Range Status   02/08/2023 7.7 6.4 - 8.3 g/dL Final   07/31/2019 7.1 6.4 - 8.3 g/dL Final   04/04/2019 7.5 6.4 - 8.3 g/dL Final     Albumin   Date Value Ref Range Status   02/08/2023 4.3 3.5 - 5.2 g/dL Final   07/31/2019 4.1 3.5 - 5.2 g/dL Final   04/04/2019 4.3 3.5 - 5.2 g/dL Final   05/30/2012 4.2 3.2 - 4.8 g/dL Final   04/04/2012 4.4 3.2 - 4.8 g/dL Final   12/08/2011 4.1 3.2 - 4.8 g/dL Final     Total Bilirubin   Date Value Ref Range Status   02/08/2023 0.4 0.0 - 1.2 mg/dL Final   07/31/2019 0.5 0.0 - 1.2 mg/dL Final   04/04/2019 0.4 0.0 - 1.2 mg/dL Final     Alkaline Phosphatase   Date Value Ref Range Status   02/08/2023 115 40 - 129 U/L Final   07/31/2019 88 40 - 129 U/L Final   04/04/2019 92 40 - 129 U/L Final     AST   Date Value Ref Range Status   02/08/2023 9 0 - 39 U/L Final   07/31/2019 21 0 - 39 U/L Final   04/04/2019 13 0 - 39 U/L Final     ALT   Date Value Ref Range Status   02/08/2023 12 0 - 40 U/L Final   07/31/2019 44 (H) 0 - 40 U/L Final   04/04/2019 14 0 - 40 U/L Final     Est, Glom Filt Rate   Date Value Ref Range Status   02/09/2023 >60 >=60 mL/min/1.73 Final     Comment:     Pediatric calculator link  Emilee.[email or web address] org/professionals/kdoqi/gfr_calculatorped  Effective Oct 3, 2022  These results are not intended for use in patients  <25years of age. eGFR results are calculated without  a race factor using the 2021 CKD-EPI equation. Careful  clinical correlation is recommended, particularly when  comparing to results calculated using previous equations. The CKD-EPI equation is less accurate in patients with  extremes of muscle mass, extra-renal metabolism of  creatinine, excessive creatinine ingestion, or following  therapy that affects renal tubular secretion. 02/08/2023 >60 >=60 mL/min/1.73 Final     Comment:     Pediatric calculator link  Emilee.at. org/professionals/kdoqi/gfr_calculatorped  Effective Oct 3, 2022  These results are not intended for use in patients  <25years of age. eGFR results are calculated without  a race factor using the 2021 CKD-EPI equation. Careful  clinical correlation is recommended, particularly when  comparing to results calculated using previous equations. The CKD-EPI equation is less accurate in patients with  extremes of muscle mass, extra-renal metabolism of  creatinine, excessive creatinine ingestion, or following  therapy that affects renal tubular secretion. GFR Non-   Date Value Ref Range Status   07/31/2019 >60 >=60 mL/min/1.73 Final     Comment:     Chronic Kidney Disease: less than 60 ml/min/1.73 sq.m. Kidney Failure: less than 15 ml/min/1.73 sq.m. Results valid for patients 18 years and older. 04/04/2019 >60 >=60 mL/min/1.73 Final     Comment:     Chronic Kidney Disease: less than 60 ml/min/1.73 sq.m. Kidney Failure: less than 15 ml/min/1.73 sq.m. Results valid for patients 18 years and older. 02/07/2019 >60 >=60 mL/min/1.73 Final     Comment:     Chronic Kidney Disease: less than 60 ml/min/1.73 sq.m. Kidney Failure: less than 15 ml/min/1.73 sq.m. Results valid for patients 18 years and older. GFR    Date Value Ref Range Status   07/31/2019 >60  Final   04/04/2019 >60  Final   02/07/2019 >60  Final     Magnesium   Date Value Ref Range Status   02/09/2023 1.9 1.6 - 2.6 mg/dL Final     Phosphorus   Date Value Ref Range Status   02/09/2023 3.8 2.5 - 4.5 mg/dL Final     Recent Labs     02/09/23  0454   PH 7.356   PO2 102.0*   PCO2 49.4*   HCO3 27.0*   BE 0.8   O2SAT 97.5       RADIOLOGY:  US DUP LOWER EXTREMITIES BILATERAL VENOUS   Final Result   No evidence of DVT in either lower extremity. XR CHEST PORTABLE   Final Result   Suboptimal inspiration with likely atelectasis at the right base. Indeterminate position of the nasogastric tube tip. Consider an abdominal   radiograph for this purpose.          CT ABDOMEN PELVIS W IV CONTRAST Additional Contrast? None   Final Result   Grossly normal CT scan abdomen and pelvis. CT CHEST W CONTRAST   Final Result   Right lung atelectasis, of otherwise unremarkable CT scan chest.         XR ABDOMEN FOR NG/OG/NE TUBE PLACEMENT   Final Result   Nasogastric tube is poorly discerned on the abdominal films but CT chest   confirms that tip of this device terminates at the gastroesophageal junction. Preliminary report is provided via ancillary staff to a license caregiver on   02/08/2023 at 9:14 p.m. EST. XR CHEST PORTABLE   Final Result   1. Medical support devices as above. The enteric tube on this exam appears   to slightly retracted with distal tip projecting over the central mediastinum. (Suggest confirmation with dedicated radiographic evaluation of the lower   chest and upper abdomen and if confirmed, suggest repositioning.)      2. Atherosclerotic disease and prominence of the cardiac silhouette. There   are bilateral interstitial opacities which are unchanged. The findings were sent to the Radiology Results [address] at 6:55   pm on 2/8/2023 to be communicated to a licensed caregiver. XR CHEST PORTABLE   Final Result   ETT as noted above. CT HEAD WO CONTRAST   Final Result   No acute intracranial abnormality. XR CHEST PORTABLE   Final Result   No acute process. Mild cardiomegaly. IR INTERVENTIONAL RADIOLOGY PROCEDURE REQUEST    (Results Pending)           PROBLEM LIST:  Principal Problem:    Acute encephalopathy  Resolved Problems:    * No resolved hospital problems.  *      IMPRESSION:  Acute hypoxemic and hypercapnic respiratory failure  Obstructive sleep apnea  Alveolar hypoventilation syndrome  Morbid obesity  COPD with exacerbation  Bilateral lower lobe community-acquired pneumonia  Multiple sclerosis (currently only on Mirapex for restless leg syndrome but this is probably related to untreated sleep apnea)    PLAN:  Reduce IV fluids  Hold diuretics for now. With intubation and relief of upper airway obstruction, the patient may diurese without additional diuretics, at least for the next 24 hours  Tube feedings  DVT and GI bleed prophylaxis  Await results of sputum for Gram stain and C&S  DuoNeb aerosols every 4 hours  Inhaled steroids  Modest doses of steroids for COPD exacerbation/bronchospasm  Antibiotics to include azithromycin, meropenem, and vancomycin  Labs, ABGs, chest x-ray in a.m. ATTESTATION:  ICU Staff Physician note of personal involvement in Care  As the attending physician, I certify that I personally reviewed the patients history and personally examined the patient to confirm the physical findings described above,  And that I reviewed the relevant imaging studies and available reports. I also discussed the differential diagnosis and all of the proposed management plans with the patient and individuals accompanying the patient to this visit. They had the opportunity to ask questions about the proposed management plans and to have those questions answered. This patient has a high probability of sudden, clinically significant deterioration, which requires the highest level of physician preparedness to intervene urgently. I managed/supervised life or organ supporting interventions that required frequent physician assessment. I devoted my full attention to the direct care of this patient for the amount of time indicated below. Time I spent with the family or surrogate(s) is included only if the patient was incapable of providing the necessary information or participating in medical decisions - Time devoted to teaching and to any procedures I billed separately is not included.     CRITICAL CARE TIME:  39 minutes    Electronically signed by Bala Ervin MD on 2/9/2023 at 12:26 PM

## 2023-02-09 NOTE — CARE COORDINATION
Case Management Assessment  Initial Evaluation    Date/Time of Evaluation: 2/9/2023 11:28 AM  Assessment Completed by: Nilton Medellin RN    If patient is discharged prior to next notation, then this note serves as note for discharge by case management. Patient Name: Silva Holland                   YOB: 1965  Diagnosis: Encephalitis [G04.90]  Acute encephalopathy [G93.40]                   Date / Time: 2/8/2023  1:38 PM    Patient Admission Status: Inpatient   Readmission Risk (Low < 19, Mod (19-27), High > 27): Readmission Risk Score: 12.9    Current PCP: Hiral Cano, DO  PCP verified by CM? Yes    Chart Reviewed: Yes      History Provided by: Child/Family  Patient Orientation: Sedated    Patient Cognition: Other (see comment) (icu on vent)    Hospitalization in the last 30 days (Readmission):  No    Advance Directives:      Code Status: Full Code   Patient's Primary Decision Maker is: Legal Next of Kin    Primary Decision MakerEameena Leigh - Child - [phone]    Primary Decision Maker: Balbir Palacios - Child - [phone]    Secondary Decision Maker: Yamilet Tao - Parent - [phone]    Discharge Planning:    Patient lives with: Family Members Type of Home: House  Primary Care Giver: Self  Patient Support Systems include: Parent, Children, Family Members   Current Financial resources: Medicare  Current community resources:  none  Current services prior to admission: Durable Medical Equipment            Current DME: Karissa Hightower            Type of Home Care services:  None    ADLS  Prior functional level: Independent in ADLs/IADLs  Current functional level: Other (see comment) (icu on vent)    Family can provide assistance at DC: Yes  Would you like Case Management to discuss the discharge plan with any other family members/significant others, and if so, who?  Yes (spoke to daughter)  Plans to Return to Present Housing: Yes  Other Identified Issues/Barriers to RETURNING to current housing: lives in basement apt at his moms' house- several steps to enter- pt with MS  Potential Assistance needed at discharge: N/A            Potential DME:  unclear  Patient expects to discharge to: Other (comment) (higher level of care- [facility])  Plan for transportation at discharge:  ambulance to [address] [place] [address] Road: Dusty Collins / Plan: Maria Eugenia Close ESSENTIAL/PLUS / Product Type: *No Product type* /       Potential assistance Purchasing Medications: Yes      [address] [facility] [employer], [address] [address]  Phone: [phone] Fax: [phone], [address] [place] [address] Drive [phone] [facility] [phone] [address]  Phone: [phone] Fax: [phone]      Notes:    Factors facilitating achievement of predicted outcomes: Family support    Barriers to discharge: sedated on vent has MS    Additional Case Management Notes: 2/9/23 Planning transfer to [facility]. ICU, Vent/Sedation, Rapid covid negative, pending PCR testing in isolation. Pts mom and daughter are at bedside- interview conducted over the phone with daughter Harry Cazares. Pt with Multiple Sclerosis diagnosed in his 29's. 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 Pt is on disability and has Medicare coverage. Ambulance sheet in soft blue chart. CM following.  [address], RN-BC  Case Management Department  Ph: [phone]

## 2023-02-09 NOTE — PROGRESS NOTES
P/c to pt's daughter, Sebastian Memory - updated pt status. Also updated with transfer status, accepted but no bed. She stated that after speaking with Dr. Qasim Santos she is agreeable to stopping the transfer at this time. If there is a change in pt's status then would like to restart the transfer at that time. Dr. Qasim Santos updated. Transfer will be stopped. 80 - p/c to [facility] - updated that Dr. Qasim Santos & pt's family would like to stop the transfer at this time.     Jaxson Waddell, RN  2/9/2023

## 2023-02-09 NOTE — PROGRESS NOTES
This patient is on medication that requires renal, weight, and/or indication dose adjustment. Date Body Weight IBW  Adjusted BW SCr  CrCl Dialysis status   2/8/2023 (!) (P) 394 lb 8 oz (178.9 kg)   Ideal body weight: 73 kg (160 lb 15 oz)  Adjusted ideal body weight: 112.7 kg (248 lb 9 oz) Serum creatinine: 1.2 mg/dL 02/08/23 1433  Estimated creatinine clearance: 110 mL/min N/a       Pharmacy has dose-adjusted the following medication(s):    Date Previous Order Adjusted Order   2/8/2023 Merrem 1000 mg Q8H Merrem 2000 mg Q8h       These changes were made per protocol according to the [address] for Pharmacists. *Please note this dose may need readjusted if patient's condition changes. Please contact pharmacy with any questions regarding these changes.     Pedro Glass [facility]  2/8/2023  11:12 PM

## 2023-02-09 NOTE — PROGRESS NOTES
Pharmacy Consultation Note  (Antibiotic Dosing and Monitoring)    Initial consult date: 02/08/2023  Consulting physician/provider: Rice  Drug: Vancomycin  Indication: Central Nervous System Infection     Age/  Gender Height Weight IBW  Allergy Information   57 y.o./male 5' 10\" (177.8 cm) (!) 380 lb (172.4 kg)     Ideal body weight: 73 kg (160 lb 15 oz)  Adjusted ideal body weight: 115.4 kg (254 lb 5.8 oz)   Bactrim [sulfamethoxazole-trimethoprim], Penicillins, and Sulfa antibiotics      Renal Function:  Recent Labs     02/08/23  1433 02/09/23  0422   BUN 19 16   CREATININE 1.2 1.0         Intake/Output Summary (Last 24 hours) at 2/9/2023 1206  Last data filed at 2/9/2023 0452  Gross per 24 hour   Intake 2491.79 ml   Output 650 ml   Net 1841.79 ml       Vancomycin Monitoring:  Trough:  No results for input(s): VANCOTROUGH in the last 72 hours. Random:  No results for input(s): VANCORANDOM in the last 72 hours. No results for input(s): Shearon Frank in the last 72 hours. Historical Cultures:  No results found for: ORG  No results for input(s): BC in the last 72 hours. Vancomycin Administration Times:  Recent vancomycin administrations                     vancomycin (VANCOCIN) 3,000 mg in sodium chloride 0.9 % 500 mL IVPB (mg) 3,000 mg New Bag 02/08/23 2004                    Assessment:  Patient is a 65 y.o. male who has been initiated on vancomycin  Estimated Creatinine Clearance: 135 mL/min (based on SCr of 1 mg/dL).   To dose vancomycin, pharmacy will be utilizing [employer] calculation software for goal AUC/BEAU 400-600 mg/L-hr    Plan:  Vancomycin 1000 mg IV q12hr  Random level tomorrow with AM labs  Will continue to monitor renal function   Pharmacy to follow    Erica Milligan PharmD, BCPS 2/9/2023 12:10 PM   Ext: [phone]

## 2023-02-09 NOTE — ACP (ADVANCE CARE PLANNING)
Advance Care Planning   Healthcare Decision Maker:    Primary Decision Maker: Brandyn Quintanilla Child - [phone]    Primary Decision Maker: Dustinaleisha Nance - Child - [phone]    Secondary Decision Maker: DinhYamilet - Parent - [phone]  Today we documented Decision Maker(s) consistent with Legal Next of Kin hierarchy. Pt is not  per family.      Electronically signed by VINITA Christopher on 2/9/2023 at 11:24 AM

## 2023-02-10 ENCOUNTER — APPOINTMENT (OUTPATIENT)
Dept: GENERAL RADIOLOGY | Age: 58
DRG: 208 | End: 2023-02-10
Payer: MEDICARE

## 2023-02-10 LAB
AADO2: 221.5 MMHG
ALBUMIN SERPL-MCNC: 3.3 G/DL (ref 3.5–5.2)
ALP BLD-CCNC: 82 U/L (ref 40–129)
ALT SERPL-CCNC: 8 U/L (ref 0–40)
ANION GAP SERPL CALCULATED.3IONS-SCNC: 8 MMOL/L (ref 7–16)
AST SERPL-CCNC: 7 U/L (ref 0–39)
B.E.: 0.3 MMOL/L (ref -3–3)
BASOPHILS ABSOLUTE: 0.08 E9/L (ref 0–0.2)
BASOPHILS RELATIVE PERCENT: 0.8 % (ref 0–2)
BILIRUB SERPL-MCNC: 0.2 MG/DL (ref 0–1.2)
BUN BLDV-MCNC: 15 MG/DL (ref 6–20)
CALCIUM SERPL-MCNC: 8.1 MG/DL (ref 8.6–10.2)
CHLORIDE BLD-SCNC: 102 MMOL/L (ref 98–107)
CHOLESTEROL, TOTAL: 141 MG/DL (ref 0–199)
CO2: 29 MMOL/L (ref 22–29)
COHB: 0 % (ref 0–1.5)
CREAT SERPL-MCNC: 1.1 MG/DL (ref 0.7–1.2)
CRITICAL: ABNORMAL
DATE ANALYZED: ABNORMAL
DATE OF COLLECTION: ABNORMAL
EKG ATRIAL RATE: 67 BPM
EKG P AXIS: 36 DEGREES
EKG P-R INTERVAL: 144 MS
EKG Q-T INTERVAL: 430 MS
EKG QRS DURATION: 94 MS
EKG QTC CALCULATION (BAZETT): 454 MS
EKG R AXIS: 49 DEGREES
EKG T AXIS: 67 DEGREES
EKG VENTRICULAR RATE: 67 BPM
EOSINOPHILS ABSOLUTE: 0.06 E9/L (ref 0.05–0.5)
EOSINOPHILS RELATIVE PERCENT: 0.6 % (ref 0–6)
FIO2: 55 %
GFR SERPL CREATININE-BSD FRML MDRD: >60 ML/MIN/1.73
GLUCOSE BLD-MCNC: 161 MG/DL (ref 74–99)
HCO3: 26.1 MMOL/L (ref 22–26)
HCT VFR BLD CALC: 39.8 % (ref 37–54)
HDLC SERPL-MCNC: 25 MG/DL
HEMOGLOBIN: 12.3 G/DL (ref 12.5–16.5)
HHB: 2.4 % (ref 0–5)
IMMATURE GRANULOCYTES #: 0.05 E9/L
IMMATURE GRANULOCYTES %: 0.5 % (ref 0–5)
LAB: ABNORMAL
LDL CHOLESTEROL CALCULATED: 44 MG/DL (ref 0–99)
LV EF: 63 %
LVEF MODALITY: NORMAL
LYMPHOCYTES ABSOLUTE: 1.47 E9/L (ref 1.5–4)
LYMPHOCYTES RELATIVE PERCENT: 15 % (ref 20–42)
Lab: ABNORMAL
MAGNESIUM: 2 MG/DL (ref 1.6–2.6)
MCH RBC QN AUTO: 30.5 PG (ref 26–35)
MCHC RBC AUTO-ENTMCNC: 30.9 % (ref 32–34.5)
MCV RBC AUTO: 98.8 FL (ref 80–99.9)
METHB: 0.5 % (ref 0–1.5)
MODE: AC
MONOCYTES ABSOLUTE: 0.92 E9/L (ref 0.1–0.95)
MONOCYTES RELATIVE PERCENT: 9.4 % (ref 2–12)
NEUTROPHILS ABSOLUTE: 7.2 E9/L (ref 1.8–7.3)
NEUTROPHILS RELATIVE PERCENT: 73.7 % (ref 43–80)
O2 CONTENT: 18.7 ML/DL
O2 SATURATION: 97.6 % (ref 92–98.5)
O2HB: 97.1 % (ref 94–97)
OPERATOR ID: ABNORMAL
PATIENT TEMP: 37 C
PCO2: 46.5 MMHG (ref 35–45)
PDW BLD-RTO: 13.2 FL (ref 11.5–15)
PEEP/CPAP: 8 CMH2O
PFO2: 1.91 MMHG/%
PH BLOOD GAS: 7.37 (ref 7.35–7.45)
PHOSPHORUS: 2.8 MG/DL (ref 2.5–4.5)
PLATELET # BLD: 283 E9/L (ref 130–450)
PMV BLD AUTO: 10 FL (ref 7–12)
PO2: 105.2 MMHG (ref 75–100)
POTASSIUM SERPL-SCNC: 3.6 MMOL/L (ref 3.5–5)
RBC # BLD: 4.03 E12/L (ref 3.8–5.8)
RI(T): 2.11
RR MECHANICAL: 20 B/MIN
SODIUM BLD-SCNC: 139 MMOL/L (ref 132–146)
SOURCE, BLOOD GAS: ABNORMAL
THB: 13.6 G/DL (ref 11.5–16.5)
TIME ANALYZED: 442
TOTAL PROTEIN: 5.9 G/DL (ref 6.4–8.3)
TRIGL SERPL-MCNC: 357 MG/DL (ref 0–149)
TRIGL SERPL-MCNC: 361 MG/DL (ref 0–149)
VANCOMYCIN RANDOM: 8.6 MCG/ML (ref 5–40)
VLDLC SERPL CALC-MCNC: 72 MG/DL
VT MECHANICAL: 450 ML
WBC # BLD: 9.8 E9/L (ref 4.5–11.5)

## 2023-02-10 PROCEDURE — 94640 AIRWAY INHALATION TREATMENT: CPT

## 2023-02-10 PROCEDURE — 71045 X-RAY EXAM CHEST 1 VIEW: CPT

## 2023-02-10 PROCEDURE — 6370000000 HC RX 637 (ALT 250 FOR IP): Performed by: INTERNAL MEDICINE

## 2023-02-10 PROCEDURE — 36592 COLLECT BLOOD FROM PICC: CPT

## 2023-02-10 PROCEDURE — 93010 ELECTROCARDIOGRAM REPORT: CPT | Performed by: INTERNAL MEDICINE

## 2023-02-10 PROCEDURE — 82805 BLOOD GASES W/O2 SATURATION: CPT

## 2023-02-10 PROCEDURE — 84100 ASSAY OF PHOSPHORUS: CPT

## 2023-02-10 PROCEDURE — 84478 ASSAY OF TRIGLYCERIDES: CPT

## 2023-02-10 PROCEDURE — C9113 INJ PANTOPRAZOLE SODIUM, VIA: HCPCS

## 2023-02-10 PROCEDURE — 6360000002 HC RX W HCPCS: Performed by: INTERNAL MEDICINE

## 2023-02-10 PROCEDURE — 80053 COMPREHEN METABOLIC PANEL: CPT

## 2023-02-10 PROCEDURE — 80202 ASSAY OF VANCOMYCIN: CPT

## 2023-02-10 PROCEDURE — 80061 LIPID PANEL: CPT

## 2023-02-10 PROCEDURE — 6360000004 HC RX CONTRAST MEDICATION

## 2023-02-10 PROCEDURE — 94003 VENT MGMT INPAT SUBQ DAY: CPT

## 2023-02-10 PROCEDURE — 2580000003 HC RX 258

## 2023-02-10 PROCEDURE — C8929 TTE W OR WO FOL WCON,DOPPLER: HCPCS

## 2023-02-10 PROCEDURE — 6360000002 HC RX W HCPCS

## 2023-02-10 PROCEDURE — 2000000000 HC ICU R&B

## 2023-02-10 PROCEDURE — 2580000003 HC RX 258: Performed by: INTERNAL MEDICINE

## 2023-02-10 PROCEDURE — 6360000002 HC RX W HCPCS: Performed by: STUDENT IN AN ORGANIZED HEALTH CARE EDUCATION/TRAINING PROGRAM

## 2023-02-10 PROCEDURE — 36415 COLL VENOUS BLD VENIPUNCTURE: CPT

## 2023-02-10 PROCEDURE — 2500000003 HC RX 250 WO HCPCS: Performed by: INTERNAL MEDICINE

## 2023-02-10 PROCEDURE — 83735 ASSAY OF MAGNESIUM: CPT

## 2023-02-10 PROCEDURE — 2580000003 HC RX 258: Performed by: STUDENT IN AN ORGANIZED HEALTH CARE EDUCATION/TRAINING PROGRAM

## 2023-02-10 PROCEDURE — 85025 COMPLETE CBC W/AUTO DIFF WBC: CPT

## 2023-02-10 RX ORDER — BACLOFEN 10 MG/1
10 TABLET ORAL 3 TIMES DAILY
Status: DISCONTINUED | OUTPATIENT
Start: 2023-02-10 | End: 2023-02-21 | Stop reason: HOSPADM

## 2023-02-10 RX ORDER — TORSEMIDE 20 MG/1
1 TABLET ORAL DAILY
COMMUNITY
Start: 2022-12-31

## 2023-02-10 RX ORDER — PREDNISONE 10 MG/1
1 TABLET ORAL 2 TIMES DAILY
Status: ON HOLD | COMMUNITY
Start: 2023-02-06 | End: 2023-02-20 | Stop reason: HOSPADM

## 2023-02-10 RX ORDER — HYDROCODONE BITARTRATE AND ACETAMINOPHEN 10; 325 MG/1; MG/1
1 TABLET ORAL EVERY 6 HOURS PRN
COMMUNITY
Start: 2023-02-06

## 2023-02-10 RX ORDER — FENTANYL CITRATE 0.05 MG/ML
50 INJECTION, SOLUTION INTRAMUSCULAR; INTRAVENOUS
Status: DISCONTINUED | OUTPATIENT
Start: 2023-02-10 | End: 2023-02-14

## 2023-02-10 RX ORDER — MONOMETHYL FUMARATE 95 MG/1
190 CAPSULE ORAL 2 TIMES DAILY
COMMUNITY
Start: 2023-01-24 | End: 2024-01-24

## 2023-02-10 RX ORDER — LEVOTHYROXINE SODIUM 175 UG/1
175 TABLET ORAL
COMMUNITY
Start: 2018-08-27

## 2023-02-10 RX ORDER — GABAPENTIN 300 MG/1
300 CAPSULE ORAL 2 TIMES DAILY
Status: DISCONTINUED | OUTPATIENT
Start: 2023-02-10 | End: 2023-02-13

## 2023-02-10 RX ORDER — DEXMEDETOMIDINE HYDROCHLORIDE 4 UG/ML
.1-1.5 INJECTION, SOLUTION INTRAVENOUS CONTINUOUS
Status: DISCONTINUED | OUTPATIENT
Start: 2023-02-10 | End: 2023-02-15

## 2023-02-10 RX ORDER — GABAPENTIN 600 MG/1
1 TABLET ORAL 3 TIMES DAILY
COMMUNITY
Start: 2022-12-26

## 2023-02-10 RX ORDER — PROPOFOL 10 MG/ML
INJECTION, EMULSION INTRAVENOUS
Status: COMPLETED
Start: 2023-02-10 | End: 2023-02-10

## 2023-02-10 RX ORDER — VALSARTAN 160 MG/1
1 TABLET ORAL DAILY
Status: ON HOLD | COMMUNITY
Start: 2022-12-05 | End: 2023-02-20 | Stop reason: HOSPADM

## 2023-02-10 RX ORDER — FLUTICASONE PROPIONATE AND SALMETEROL XINAFOATE 115; 21 UG/1; UG/1
2 AEROSOL, METERED RESPIRATORY (INHALATION) 2 TIMES DAILY
Status: ON HOLD | COMMUNITY
Start: 2023-02-08 | End: 2023-02-20 | Stop reason: HOSPADM

## 2023-02-10 RX ADMIN — ENOXAPARIN SODIUM 60 MG: 100 INJECTION SUBCUTANEOUS at 21:08

## 2023-02-10 RX ADMIN — Medication 10 ML: at 08:11

## 2023-02-10 RX ADMIN — ACYCLOVIR SODIUM 750 MG: 50 INJECTION, SOLUTION INTRAVENOUS at 03:28

## 2023-02-10 RX ADMIN — BUDESONIDE 500 MCG: 0.5 SUSPENSION RESPIRATORY (INHALATION) at 16:48

## 2023-02-10 RX ADMIN — IPRATROPIUM BROMIDE AND ALBUTEROL SULFATE 1 AMPULE: .5; 2.5 SOLUTION RESPIRATORY (INHALATION) at 01:32

## 2023-02-10 RX ADMIN — PROPOFOL 50 MCG/KG/MIN: 10 INJECTION, EMULSION INTRAVENOUS at 06:04

## 2023-02-10 RX ADMIN — PERFLUTREN 1.5 ML: 6.52 INJECTION, SUSPENSION INTRAVENOUS at 08:03

## 2023-02-10 RX ADMIN — PROPOFOL 50 MCG/KG/MIN: 10 INJECTION, EMULSION INTRAVENOUS at 11:56

## 2023-02-10 RX ADMIN — FENTANYL CITRATE 50 MCG: 0.05 INJECTION, SOLUTION INTRAMUSCULAR; INTRAVENOUS at 18:05

## 2023-02-10 RX ADMIN — Medication 10 ML: at 23:24

## 2023-02-10 RX ADMIN — PROPOFOL 1000 MG: 10 INJECTION, EMULSION INTRAVENOUS at 23:20

## 2023-02-10 RX ADMIN — IPRATROPIUM BROMIDE AND ALBUTEROL SULFATE 1 AMPULE: .5; 2.5 SOLUTION RESPIRATORY (INHALATION) at 05:08

## 2023-02-10 RX ADMIN — IPRATROPIUM BROMIDE AND ALBUTEROL SULFATE 1 AMPULE: .5; 2.5 SOLUTION RESPIRATORY (INHALATION) at 09:29

## 2023-02-10 RX ADMIN — BACLOFEN 10 MG: 10 TABLET ORAL at 14:40

## 2023-02-10 RX ADMIN — MEROPENEM 2000 MG: 1 INJECTION, POWDER, FOR SOLUTION INTRAVENOUS at 03:25

## 2023-02-10 RX ADMIN — Medication 10 ML: at 23:25

## 2023-02-10 RX ADMIN — IPRATROPIUM BROMIDE AND ALBUTEROL SULFATE 1 AMPULE: .5; 2.5 SOLUTION RESPIRATORY (INHALATION) at 20:51

## 2023-02-10 RX ADMIN — PRAMIPEXOLE DIHYDROCHLORIDE 0.12 MG: 0.12 TABLET ORAL at 14:40

## 2023-02-10 RX ADMIN — FENTANYL CITRATE 50 MCG: 0.05 INJECTION, SOLUTION INTRAMUSCULAR; INTRAVENOUS at 15:42

## 2023-02-10 RX ADMIN — BACLOFEN 20 MG: 10 TABLET ORAL at 08:12

## 2023-02-10 RX ADMIN — SODIUM CHLORIDE 2.5 MG/KG/HR: 9 INJECTION, SOLUTION INTRAVENOUS at 19:13

## 2023-02-10 RX ADMIN — SODIUM CHLORIDE, POTASSIUM CHLORIDE, SODIUM LACTATE AND CALCIUM CHLORIDE: 600; 310; 30; 20 INJECTION, SOLUTION INTRAVENOUS at 05:54

## 2023-02-10 RX ADMIN — ACETAMINOPHEN 650 MG: 325 TABLET ORAL at 21:08

## 2023-02-10 RX ADMIN — GABAPENTIN 600 MG: 300 CAPSULE ORAL at 08:12

## 2023-02-10 RX ADMIN — METOPROLOL TARTRATE 25 MG: 25 TABLET, FILM COATED ORAL at 08:15

## 2023-02-10 RX ADMIN — METOPROLOL TARTRATE 25 MG: 25 TABLET, FILM COATED ORAL at 21:08

## 2023-02-10 RX ADMIN — VANCOMYCIN HYDROCHLORIDE 1250 MG: 10 INJECTION, POWDER, LYOPHILIZED, FOR SOLUTION INTRAVENOUS at 17:00

## 2023-02-10 RX ADMIN — PRAMIPEXOLE DIHYDROCHLORIDE 0.12 MG: 0.12 TABLET ORAL at 08:16

## 2023-02-10 RX ADMIN — IPRATROPIUM BROMIDE AND ALBUTEROL SULFATE 1 AMPULE: .5; 2.5 SOLUTION RESPIRATORY (INHALATION) at 13:22

## 2023-02-10 RX ADMIN — ACETAMINOPHEN 650 MG: 325 TABLET ORAL at 08:12

## 2023-02-10 RX ADMIN — GABAPENTIN 300 MG: 300 CAPSULE ORAL at 21:08

## 2023-02-10 RX ADMIN — MEROPENEM 1000 MG: 1 INJECTION, POWDER, FOR SOLUTION INTRAVENOUS at 19:06

## 2023-02-10 RX ADMIN — BUDESONIDE 500 MCG: 0.5 SUSPENSION RESPIRATORY (INHALATION) at 05:08

## 2023-02-10 RX ADMIN — BACLOFEN 10 MG: 10 TABLET ORAL at 21:08

## 2023-02-10 RX ADMIN — PROPOFOL 50 MCG/KG/MIN: 10 INJECTION, EMULSION INTRAVENOUS at 01:00

## 2023-02-10 RX ADMIN — IPRATROPIUM BROMIDE AND ALBUTEROL SULFATE 1 AMPULE: .5; 2.5 SOLUTION RESPIRATORY (INHALATION) at 16:48

## 2023-02-10 RX ADMIN — AZITHROMYCIN MONOHYDRATE 500 MG: 500 INJECTION, POWDER, LYOPHILIZED, FOR SOLUTION INTRAVENOUS at 06:34

## 2023-02-10 RX ADMIN — FENTANYL CITRATE 150 MCG/HR: 50 INJECTION, SOLUTION INTRAMUSCULAR; INTRAVENOUS at 05:56

## 2023-02-10 RX ADMIN — Medication 10 ML: at 08:12

## 2023-02-10 RX ADMIN — PRAMIPEXOLE DIHYDROCHLORIDE 0.12 MG: 0.12 TABLET ORAL at 22:56

## 2023-02-10 RX ADMIN — SODIUM CHLORIDE, PRESERVATIVE FREE 40 MG: 5 INJECTION INTRAVENOUS at 08:11

## 2023-02-10 RX ADMIN — ENOXAPARIN SODIUM 60 MG: 100 INJECTION SUBCUTANEOUS at 12:33

## 2023-02-10 RX ADMIN — VANCOMYCIN HYDROCHLORIDE 1250 MG: 10 INJECTION, POWDER, LYOPHILIZED, FOR SOLUTION INTRAVENOUS at 08:23

## 2023-02-10 RX ADMIN — LEVOTHYROXINE SODIUM 175 MCG: 0.17 TABLET ORAL at 06:35

## 2023-02-10 RX ADMIN — PROPOFOL 50 MCG/KG/MIN: 10 INJECTION, EMULSION INTRAVENOUS at 17:18

## 2023-02-10 ASSESSMENT — PULMONARY FUNCTION TESTS
PIF_VALUE: 24
PIF_VALUE: 25
PIF_VALUE: 28
PIF_VALUE: 26
PIF_VALUE: 24
PIF_VALUE: 27
PIF_VALUE: 26
PIF_VALUE: 25
PIF_VALUE: 26
PIF_VALUE: 23
PIF_VALUE: 27
PIF_VALUE: 27
PIF_VALUE: 25
PIF_VALUE: 27
PIF_VALUE: 26
PIF_VALUE: 25
PIF_VALUE: 26
PIF_VALUE: 26
PIF_VALUE: 25
PIF_VALUE: 24
PIF_VALUE: 25
PIF_VALUE: 29
PIF_VALUE: 26
PIF_VALUE: 27

## 2023-02-10 NOTE — PLAN OF CARE
Problem: Discharge Planning  Goal: Discharge to home or other facility with appropriate resources  2/10/2023 0108 by Dwight Francisco RN  Outcome: Progressing  2/9/2023 2051 by Aaron Luna RN  Outcome: Progressing     Problem: Pain  Goal: Verbalizes/displays adequate comfort level or baseline comfort level  2/10/2023 0108 by Dwight Francisco RN  Outcome: Progressing  2/9/2023 2051 by Aaron Luna RN  Outcome: Progressing     Problem: Safety - Medical Restraint  Goal: Remains free of injury from restraints (Restraint for Interference with Medical Device)  Description: INTERVENTIONS:  1. Determine that other, less restrictive measures have been tried or would not be effective before applying the restraint  2. Evaluate the patient's condition at the time of restraint application  3. Inform patient/family regarding the reason for restraint  4.  Q2H: Monitor safety, psychosocial status, comfort, nutrition and hydration  2/10/2023 0108 by Dwight Francisco RN  Outcome: Progressing  Flowsheets  Taken 2/10/2023 0000  Remains free of injury from restraints (restraint for interference with medical device): Every 2 hours: Monitor safety, psychosocial status, comfort, nutrition and hydration  Taken 2/9/2023 2200  Remains free of injury from restraints (restraint for interference with medical device): Every 2 hours: Monitor safety, psychosocial status, comfort, nutrition and hydration  2/9/2023 2051 by Aaron Luna RN  Outcome: Progressing  Flowsheets  Taken 2/9/2023 2000 by Dwight Francisco RN  Remains free of injury from restraints (restraint for interference with medical device): Every 2 hours: Monitor safety, psychosocial status, comfort, nutrition and hydration  Taken 2/9/2023 1800 by Aaron Luna RN  Remains free of injury from restraints (restraint for interference with medical device): Every 2 hours: Monitor safety, psychosocial status, comfort, nutrition and hydration  Taken 2/9/2023 1600 by Aaron Luna RN  Remains free of injury from restraints (restraint for interference with medical device): Every 2 hours: Monitor safety, psychosocial status, comfort, nutrition and hydration  Taken 2/9/2023 1400 by Karen Sepulveda RN  Remains free of injury from restraints (restraint for interference with medical device): Every 2 hours: Monitor safety, psychosocial status, comfort, nutrition and hydration  Taken 2/9/2023 1200 by Karen Sepulveda RN  Remains free of injury from restraints (restraint for interference with medical device): Every 2 hours: Monitor safety, psychosocial status, comfort, nutrition and hydration  Taken 2/9/2023 1000 by Karen Sepulveda RN  Remains free of injury from restraints (restraint for interference with medical device): Every 2 hours: Monitor safety, psychosocial status, comfort, nutrition and hydration  Taken 2/9/2023 0800 by Karen Sepulveda RN  Remains free of injury from restraints (restraint for interference with medical device): Every 2 hours: Monitor safety, psychosocial status, comfort, nutrition and hydration     Problem: Skin/Tissue Integrity  Goal: Absence of new skin breakdown  Description: 1. Monitor for areas of redness and/or skin breakdown  2. Assess vascular access sites hourly  3. Every 4-6 hours minimum:  Change oxygen saturation probe site  4. Every 4-6 hours:  If on nasal continuous positive airway pressure, respiratory therapy assess nares and determine need for appliance change or resting period.   2/10/2023 0108 by Brenda Baltazar RN  Outcome: Progressing  2/9/2023 2051 by Karen Sepulveda RN  Outcome: Progressing     Problem: Nutrition Deficit:  Goal: Optimize nutritional status  2/10/2023 0108 by Brenda Baltazar RN  Outcome: Progressing  2/9/2023 2051 by Karen Sepulveda RN  Outcome: Progressing     Problem: Respiratory - Adult  Goal: Achieves optimal ventilation and oxygenation  2/10/2023 0108 by Brenda Baltazar RN  Outcome: Progressing  2/9/2023 2051 by Karen Sepulveda RN  Outcome: Progressing Problem: Genitourinary - Adult  Goal: Urinary catheter remains patent  2/10/2023 0108 by Mani Olea RN  Outcome: Progressing  2/9/2023 2051 by Tha Pichardo RN  Outcome: Progressing     Problem: Safety - Adult  Goal: Free from fall injury  Outcome: Progressing     Problem: ABCDS Injury Assessment  Goal: Absence of physical injury  Outcome: Progressing

## 2023-02-10 NOTE — PLAN OF CARE
Problem: Discharge Planning  Goal: Discharge to home or other facility with appropriate resources  Outcome: Progressing     Problem: Pain  Goal: Verbalizes/displays adequate comfort level or baseline comfort level  Outcome: Progressing     Problem: Safety - Medical Restraint  Goal: Remains free of injury from restraints (Restraint for Interference with Medical Device)  Description: INTERVENTIONS:  1. Determine that other, less restrictive measures have been tried or would not be effective before applying the restraint  2. Evaluate the patient's condition at the time of restraint application  3. Inform patient/family regarding the reason for restraint  4.  Q2H: Monitor safety, psychosocial status, comfort, nutrition and hydration  Outcome: Progressing  Flowsheets  Taken 2/9/2023 1800  Remains free of injury from restraints (restraint for interference with medical device): Every 2 hours: Monitor safety, psychosocial status, comfort, nutrition and hydration  Taken 2/9/2023 1600  Remains free of injury from restraints (restraint for interference with medical device): Every 2 hours: Monitor safety, psychosocial status, comfort, nutrition and hydration  Taken 2/9/2023 1400  Remains free of injury from restraints (restraint for interference with medical device): Every 2 hours: Monitor safety, psychosocial status, comfort, nutrition and hydration  Taken 2/9/2023 1200  Remains free of injury from restraints (restraint for interference with medical device): Every 2 hours: Monitor safety, psychosocial status, comfort, nutrition and hydration  Taken 2/9/2023 1000  Remains free of injury from restraints (restraint for interference with medical device): Every 2 hours: Monitor safety, psychosocial status, comfort, nutrition and hydration  Taken 2/9/2023 0800  Remains free of injury from restraints (restraint for interference with medical device): Every 2 hours: Monitor safety, psychosocial status, comfort, nutrition and hydration     Problem: Skin/Tissue Integrity  Goal: Absence of new skin breakdown  Description: 1. Monitor for areas of redness and/or skin breakdown  2. Assess vascular access sites hourly  3. Every 4-6 hours minimum:  Change oxygen saturation probe site  4. Every 4-6 hours:  If on nasal continuous positive airway pressure, respiratory therapy assess nares and determine need for appliance change or resting period.   Outcome: Progressing     Problem: Nutrition Deficit:  Goal: Optimize nutritional status  Outcome: Progressing     Problem: Respiratory - Adult  Goal: Achieves optimal ventilation and oxygenation  Outcome: Progressing     Problem: Genitourinary - Adult  Goal: Urinary catheter remains patent  Outcome: Progressing

## 2023-02-10 NOTE — PROGRESS NOTES
Assessment:  Acute hypoxemic and hypercapnic respiratory failure requiring mechanical ventilation  Exacerbation of COPD  Encephalopathy rule out meningitis  Community-acquired pneumonia  Obesity hypoventilation syndrome  History of multiple sclerosis since age 28  History of alcohol and drug abuse  Morbid obesity with BMI 56.60 kg/m²    Plan :   Mechanical ventilation with protective lung strategy  Continue  Merrem, and vancomycin but discontinue azithromycin and acyclovir. Procalcitonin was 0.04 on admission which is infection very unlikely. Follow-up echocardiogram result  Index of suspicion is low for angitis. DVT prophylaxis        History of Present Illness:   Patient is a 52-year-old male with a past medical history of hyperlipidemia, hypertension, lymphedema, multiple sclerosis, thyroid disease, alcohol abuse, drug abuse and lumbar spinal stenosis. He presented to the emergency department for altered mental status. The patient lives in his mother's house and stays in the basement. This morning around 5 AM he woke up his mother by talking very loudly she went down to investigate and he was acting strangely and talking gibberish. Later that morning he went into the bathroom and did not come out and she got concerned and called EMS. EMS did administer Narcan due to his altered mental status but there was no change in his mental status. On arrival to the emergency department he is alert and oriented to self and place only but able to make some sense but his condition had declined which required intubation to protect his airway. Arterial blood gases were drawn that showed a pH of 7.33, PCO2 55.9, HCO3 of 29.6, and a PO2 of 98. Urine drug screen was positive for opiates but he does take prescribed Percocet at home. Ethanol level less than 10. his WBCs were slightly elevated at 14.2. Urinalysis was unremarkable. CAT scan of the head was unremarkable.   CAT scan of the chest showed right lung atelectasis. Chest x-ray showed no acute process mild cardiomegaly. Upon review there is dense consolidation on the right lower lobe. February 8, 2023:  Patient is seen and examined at the bedside in the emergency department. He is currently intubated and sedated with fentanyl and propofol. His mother and his daughter is at the bedside. Patient has been accepted at the [facility] intensive care but there are no beds currently available. We will continue antibiotic therapy to cover central nervous system infection and community-acquired pneumonia. February 10, 2023:  Patient had an uneventful night however he remained sedated, intubated, mechanically ventilated. He remained afebrile. White blood cells count has improved. LP could not be performed due to his size however index of suspicion for meningitis is extremely low with no fever, no neck stiffness, normal procalcitonin.     Past Medical History:  Past Medical History:   Diagnosis Date    Dermatophytosis 1/12/2023    Hyperlipidemia     Hypertension     Leg pain     Leg swelling 1/12/2023    Lymphedema     Lymphedema of both lower extremities 1/12/2023    MS (multiple sclerosis) ([facility] [address]     Multiple sclerosis ([facility] [address]     Multiple sclerosis ([facility] [address] 1/12/2023    With significant weakness of both legs follows up with [facility]    Spinal stenosis, lumbar     Thyroid disease     Tinea pedis of both feet 1/12/2023      Past Surgical History:   Procedure Laterality Date    HERNIA REPAIR      TONSILLECTOMY         Family History:   @[facility]@    Allergies:         Bactrim [sulfamethoxazole-trimethoprim], Penicillins, and Sulfa antibiotics    Social history:  Social History     Socioeconomic History    Marital status:      Spouse name: Not on file    Number of children: Not on file    Years of education: Not on file    Highest education level: Not on file   Occupational History    Not on file   Tobacco Use    Smoking status: Every Day Packs/day: 1.00     Types: Cigarettes    Smokeless tobacco: Never   Substance and Sexual Activity    Alcohol use: Yes     Comment: rarely    Drug use: Not Currently     Types: Marijuana Connorreina Green)     Comment: edible    Sexual activity: Not on file   Other Topics Concern    Not on file   Social History Narrative    ** Merged History Encounter **          Social Determinants of Health     Financial Resource Strain: Not on file   Food Insecurity: Not on file   Transportation Needs: Not on file   Physical Activity: Not on file   Stress: Not on file   Social Connections: Not on file   Intimate Partner Violence: Not on file   Housing Stability: Not on file       Current Medications:     Current Facility-Administered Medications:     vancomycin (VANCOCIN) 1,250 mg in sodium chloride 0.9 % 250 mL IVPB, 1,250 mg, IntraVENous, Q8H, MASHA Medina CNP, Last Rate: 166.7 mL/hr at 02/10/23 0823, 1,250 mg at 02/10/23 0823    dexmedetomidine (PRECEDEX) 400 mcg in sodium chloride 0.9 % 100 mL infusion, 0.1-1.5 mcg/kg/hr, IntraVENous, Continuous, Floraludwig Patel MD    propofol 3,000 mg in 300 mL infusion, 5-50 mcg/kg/min, IntraVENous, Continuous, Merkel Cedrick, DO, Last Rate: 51.7 mL/hr at 02/10/23 1156, 50 mcg/kg/min at 02/10/23 1156    glucose chewable tablet 16 g, 4 tablet, Oral, PRN, Ismail U Manan, DO    dextrose bolus 10% 125 mL, 125 mL, IntraVENous, PRN **OR** dextrose bolus 10% 250 mL, 250 mL, IntraVENous, PRN, Ismail U Manan, DO    glucagon (rDNA) injection 1 mg, 1 mg, SubCUTAneous, PRN, Ismail U Manan, DO    dextrose 10 % infusion, , IntraVENous, Continuous PRN, Ismail U Manan, DO    sodium chloride flush 0.9 % injection 5-40 mL, 5-40 mL, IntraVENous, 2 times per day, Alyssa Purchase, DO, 10 mL at 02/10/23 0811    sodium chloride flush 0.9 % injection 5-40 mL, 5-40 mL, IntraVENous, PRN, Ismail U Manan, DO    polyethylene glycol (GLYCOLAX) packet 17 g, 17 g, Oral, Daily PRN, Alyssa Purchase, DO acetaminophen (TYLENOL) tablet 650 mg, 650 mg, Oral, Q6H PRN, 650 mg at 02/10/23 5222 **OR** acetaminophen (TYLENOL) suppository 650 mg, 650 mg, Rectal, Q6H PRN, Rose Hinkle DO    azithromycin (ZITHROMAX) 500 mg in sodium chloride 0.9 % 250 mL IVPB ([id number]), 500 mg, IntraVENous, Q24H, MASHA Landeros - CNP, Stopped at 02/10/23 5726    levothyroxine (SYNTHROID) tablet 175 mcg, 175 mcg, Oral, Daily, Yina Roman, , 175 mcg at 02/10/23 9453    metoprolol tartrate (LOPRESSOR) tablet 25 mg, 25 mg, Oral, BID, Ismail U Manan, DO, 25 mg at 02/10/23 0815    pramipexole (MIRAPEX) tablet 0.125 mg, 0.125 mg, Oral, TID, Yina Roman DO, 0.125 mg at 02/10/23 0816    baclofen (LIORESAL) tablet 20 mg, 20 mg, Oral, TID, Yina Roman DO, 20 mg at 02/10/23 0812    enoxaparin (LOVENOX) injection 60 mg, 60 mg, SubCUTAneous, BID, Himanshu Lowe MD, 60 mg at 02/09/23 2106    ipratropium-albuterol (DUONEB) nebulizer solution 1 ampule, 1 ampule, Inhalation, Q4H, Himanshu Lowe MD, 1 ampule at 02/10/23 0929    gabapentin (NEURONTIN) capsule 600 mg, 600 mg, Oral, BID, Ismail U Manan, DO, 600 mg at 02/10/23 8113    ketamine (KETALAR) 1,250 mg in sodium chloride 0.9 % 250 mL infusion, 0.05-1 mg/kg/hr (Ideal), IntraVENous, Continuous, Himanshu Lowe MD, Last Rate: 1.5 mL/hr at 02/10/23 1044, 0.1 mg/kg/hr at 02/10/23 1044    sodium chloride flush 0.9 % injection 5-40 mL, 5-40 mL, IntraVENous, 2 times per day, Elli Wasserman DO, 10 mL at 02/10/23 6255    sodium chloride flush 0.9 % injection 5-40 mL, 5-40 mL, IntraVENous, PRN, Elli Wasserman DO    0.9 % sodium chloride infusion, , IntraVENous, PRN, Elli Wasserman DO    lactated ringers IV soln infusion, , IntraVENous, Continuous, Himanshu Lowe MD, Last Rate: 50 mL/hr at 02/10/23 0554, New Bag at 02/10/23 0554    acyclovir (ZOVIRAX) 750 mg in sodium chloride 0.9 % 250 mL IVPB, 10 mg/kg (Ideal), IntraVENous, Q8H, Brittney Gaming, APRN - CNP, Stopped at 02/10/23 0430    pantoprazole (PROTONIX) 40 mg in sodium chloride (PF) 0.9 % 10 mL injection, 40 mg, IntraVENous, Daily, Tracie Basilio APRN - CNP, 40 mg at 02/10/23 0811    meropenem (MERREM) 2,000 mg in sodium chloride 0.9 % 100 mL IVPB, 2,000 mg, IntraVENous, Q8H, Tracie Basilio APRN - CNP, Stopped at 02/10/23 0635    budesonide (PULMICORT) nebulizer suspension 500 mcg, 0.5 mg, Nebulization, BID, Tracie Basilio APRN - CNP, 500 mcg at 02/10/23 5066    Review of Systems:   Unable to perform due to patient intubated and sedated    Physical Exam:   Vital Signs:  /83   Pulse 69   Temp 100.4 °F (38 °C) (Bladder)   Resp 20   Ht 5' 10\" (1.778 m)   Wt (!) 407 lb (184.6 kg)   SpO2 98%   BMI 58.40 kg/m²     Input/Output: In: 3842.4 [I.V.:1738.6; NG/GT:560]  Out: 1100     Oxygen requirements: 50%    Ventilator Information:  Vent Information  Ventilator ID: [id number]  Equipment Changed: Airway securing device  Ventilator Initiate: Yes  Vent Mode: AC/VC    General appearance: Intubated and sedated   HEENT: Atraumatic/normocephalic,  PATSY, pharynx clear, dry mucosa   Neck: Supple, no jugular venous distension, lymphadenopathy, thyromegaly or carotid bruits  Chest: Diminished bilateral, no wheezing, no crackles and no tenderness over ribs   Cardiovascular: Normal S1 , S2, regular rate and rhythm, no murmur, rub or gallop  Abdomen: Obese normal sounds present, soft,no hepatosplenomegaly, and no masses  Extremities: 2-3+ edema bilateral lower extremities. Pulses are equally present. Skin: intact, no rashes   Neurologic: Intubated and sedated  Investigations:  Labs, radiological imaging and cardiac work up were reviewed        ICU NURSE PRACTITIONER NOTE OF PERSONAL INVOLVEMENT IN CARE  As the nurse practitioner, I certify that I personally reviewed the patient's history and personally examined the patient to confirm the physical findings described above, and that I reviewed the relevant imaging studies and available reports.   I also discussed the differential diagnosis and all of the proposed management plans with the patient and individuals accompanying the patient to this visit. They had the opportunity to ask questions about the proposed management plans and to have those questions answered. This patient has a high probability of sudden, clinically significant deterioration, which requires the highest level of  preparedness to intervene urgently. I managed/supervised life or organ supporting interventions that required frequent  assessment. I devoted my full attention to the direct care of this patient for the amount of time indicated below. Time I spent with family or surrogate(s) is included only if the patient was incapable of providing the necessary information or participating in medical decision-making. Time devoted to teaching and to any procedures I billed separately is not included.     Critical Care Time: 42 minutes      Electronically signed by Harpreet Willis MD on 2/10/2023 at 11:58 AM

## 2023-02-10 NOTE — PLAN OF CARE
Problem: Discharge Planning  Goal: Discharge to home or other facility with appropriate resources  2/10/2023 0850 by Trent Tierney RN  Outcome: Not Progressing     Problem: Pain  Goal: Verbalizes/displays adequate comfort level or baseline comfort level  2/10/2023 0850 by Trent Tierney RN  Outcome: Progressing    Problem: Safety - Medical Restraint  Goal: Remains free of injury from restraints (Restraint for Interference with Medical Device)  Description: INTERVENTIONS:  1. Determine that other, less restrictive measures have been tried or would not be effective before applying the restraint  2. Evaluate the patient's condition at the time of restraint application  3. Inform patient/family regarding the reason for restraint  4.  Q2H: Monitor safety, psychosocial status, comfort, nutrition and hydration  2/10/2023 0850 by Trent Tierney RN  Outcome: Progressing  Flowsheets  Taken 2/10/2023 0800 by Trent Tierney RN  Remains free of injury from restraints (restraint for interference with medical device): Every 2 hours: Monitor safety, psychosocial status, comfort, nutrition and hydration  Outcome: Progressing  Flowsheets  Taken 2/10/2023 0000  Remains free of injury from restraints (restraint for interference with medical device): Every 2 hours: Monitor safety, psychosocial status, comfort, nutrition and hydration  Taken 2/9/2023 2200  Remains free of injury from restraints (restraint for interference with medical device): Every 2 hours: Monitor safety, psychosocial status, comfort, nutrition and hydration  Flowsheets  Goal: Optimize nutritional status  2/10/2023 0850 by Trent Tierney RN  Outcome: Progressing     Outcome: Progressing  Problem: Genitourinary - Adult  Goal: Urinary catheter remains patent  2/10/2023 0850 by Trent Tierney RN  Outcome: Progressing     Problem: Safety - Adult  Goal: Free from fall injury  2/10/2023 0850 by Trent Tierney RN  Outcome: Progressing     Problem: ABCDS Injury Assessment  Goal: Absence of physical injury  2/10/2023 0850 by Juana Roberts RN  Outcome: Progressing       Problem: Discharge Planning  Goal: Discharge to home or other facility with appropriate resources  2/10/2023 0850 by Juana Roberts, RN  Outcome: Not Progressing

## 2023-02-10 NOTE — PROGRESS NOTES
Pharmacy Consultation Note  (Antibiotic Dosing and Monitoring)    Initial consult date: 02/08/2023  Consulting physician/provider: Rice  Drug: Vancomycin  Indication: Central Nervous System Infection     Age/  Gender Height Weight IBW  Allergy Information   57 y.o./male 5' 10\" (177.8 cm) (!) 380 lb (172.4 kg)     Ideal body weight: 73 kg (160 lb 15 oz)  Adjusted ideal body weight: 117.6 kg (259 lb 5.8 oz)   Bactrim [sulfamethoxazole-trimethoprim], Penicillins, and Sulfa antibiotics      Renal Function:  Recent Labs     02/08/23  1433 02/09/23  0422 02/10/23  0435   BUN 19 16 15   CREATININE 1.2 1.0 1.1         Intake/Output Summary (Last 24 hours) at 2/10/2023 0749  Last data filed at 2/10/2023 0618  Gross per 24 hour   Intake 3842.37 ml   Output 1100 ml   Net 2742.37 ml       Vancomycin Monitoring:  Trough:  No results for input(s): VANCOTROUGH in the last 72 hours. Random:    Recent Labs     02/10/23  0435   VANCORANDOM 8.6       Recent Labs     02/08/23  1433   BLOODCULT2 24 Hours no growth        Historical Cultures:  No results found for: ORG  Recent Labs     02/08/23  1433   BC 24 Hours no growth     Recent vancomycin administrations                     vancomycin (VANCOCIN) 1,000 mg in sodium chloride 0.9 % 250 mL IVPB ([id number]) (mg) 1,000 mg New Bag 02/09/23 2059     1,000 mg New Bag  0819    vancomycin (VANCOCIN) 3,000 mg in sodium chloride 0.9 % 500 mL IVPB (mg) 3,000 mg New Bag 02/08/23 2004               Assessment:  Patient is a 65 y.o. male who has been initiated on vancomycin  Estimated Creatinine Clearance: 125 mL/min (based on SCr of 1.1 mg/dL). To dose vancomycin, pharmacy will be utilizing FastCAP calculation software for goal AUC/BEAU 400-600 mg/L-hr  2/10: Random AM level = 8.6 mcg/mL. AUC/BEAU 290 mg/L.hr. Est true trough of 4.5 mcg/mL. Plan:  Increase to vancomycin 1250 mg IV q8hr  Trough tomorrow @ 0800.  Hold dose if level > 20 mcg/mL  Will continue to monitor renal function   Pharmacy to follow    Jhon Chin PharmD, BCPS 2/10/2023 7:49 AM   Ext: [phone]

## 2023-02-10 NOTE — CARE COORDINATION
2/10/23 ICU, Vent/Sedation. Canceled [facility] transfer per dr/family request. Unable to complete LP due to needle size needed- notes indicate pt is low risk for meningitis. Pt with Multiple Sclerosis diagnosed in his 29's. 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 Pt is on disability and has Medicare coverage. Ambulance sheet in soft blue chart. CM following.  Electronically signed by VINITA Mcgrath on 2/10/2023 at 9:56 AM

## 2023-02-10 NOTE — PROGRESS NOTES
Pharmacy Admission Medication Reconciliation      Patient Name: Calderon Tao  MRN: [id number]  : 1965    Allergies:  Bactrim [sulfamethoxazole-trimethoprim], Penicillins, and Sulfa antibiotics    Current Prior to Admission (PTA) Medications  Prior to Visit Medications    Medication Sig Taking? Authorizing Provider   levothyroxine (SYNTHROID) 175 MCG tablet Take 175 mcg by mouth every morning (before breakfast) Yes Historical Provider, MD   metFORMIN (GLUCOPHAGE) 500 MG tablet Take 500 mg by mouth 2 times daily Yes Historical Provider, MD   Monomethyl Fumarate (BAFIERTAM) 95 MG CPDR Take 190 mg by mouth 2 times daily Yes Historical Provider, MD   ADVAIR -21 MCG/ACT inhaler Inhale 2 puffs into the lungs in the morning and at bedtime  Historical Provider, MD   gabapentin (NEURONTIN) 600 MG tablet Take 1 tablet by mouth in the morning, at noon, and at bedtime.  Historical Provider, MD   HYDROcodone-acetaminophen (NORCO)  MG per tablet Take 1 tablet by mouth every 6 hours as needed.  Historical Provider, MD   predniSONE (DELTASONE) 10 MG tablet Take 1 tablet by mouth in the morning and at bedtime  Historical Provider, MD   torsemide (DEMADEX) 20 MG tablet Take 1 tablet by mouth daily  Historical Provider, MD   valsartan (DIOVAN) 160 MG tablet Take 1 tablet by mouth daily  Historical Provider, MD   miconazole nitrate 2 % OINT Apply topically 2 times daily Apply to the toes in between the toes both feet and both calfs up to the knee twice a day for 1 month  Gianluca Bains MD   spironolactone (ALDACTONE) 25 MG tablet Take 25 mg by mouth daily  Historical Provider, MD   pramipexole (MIRAPEX) 0.125 MG tablet Take 0.125 mg by mouth 3 times daily  Historical Provider, MD   baclofen (LIORESAL) 20 MG tablet Take 20 mg by mouth 3 times daily  Historical Provider, MD   metoprolol tartrate (LOPRESSOR) 25 MG tablet Take 25 mg by mouth 2 times daily  Historical Provider, MD   terbinafine (LAMISIL) 250 MG  tablet Take 1 tablet by mouth daily  Shannan Baird MD   bumetanide (BUMEX) 1 MG tablet Take 1 mg by mouth daily  Historical Provider, MD   levothyroxine (SYNTHROID) 175 MCG tablet Take 175 mcg by mouth Daily  Historical Provider, MD   metFORMIN (GLUCOPHAGE) 500 MG tablet Take 500 mg by mouth 2 times daily (with meals)  Historical Provider, MD   oxyCODONE-acetaminophen (PERCOCET)  MG per tablet Take 1 tablet by mouth every 4 hours as needed for Pain. Historical Provider, MD   ipratropium (ATROVENT) 0.02 % nebulizer solution Take 2.5 mLs by nebulization 4 times daily  Elli Armas MD   Dimethyl Fumarate (TECFIDERA PO) Take  by mouth 2 times daily. Historical Provider, MD   gabapentin (NEURONTIN) 300 MG capsule Take 600 mg by mouth 2 times daily. Historical Provider, MD   albuterol (PROVENTIL HFA) 108 (90 BASE) MCG/ACT inhaler Inhale 2 puffs into the lungs every 6 hours as needed for Wheezing for 7 days. Elli Armas MD   vitamin E 1000 UNITS capsule Take 1,000 Units by mouth daily. Historical Provider, MD   potassium chloride (KLOR-CON) 10 MEQ CR tablet Take 10 mEq by mouth daily. Historical Provider, MD   loratadine (CLARITIN) 10 MG tablet Take 10 mg by mouth daily. Historical Provider, MD   furosemide (LASIX) 20 MG tablet Take 20 mg by mouth daily. Historical Provider, MD   Cholecalciferol (VITAMIN D3) 5000 UNITS CAPS Take  by mouth daily.     Historical Provider, MD         Source of history: [employer]    Medication non-adherence identified: Unable to assess    Preferred pharmacy: Unable to assess      Medication Changes Made to PTA List:    Medication Change/Update   Albuterol Removed   Furosemide Removed   Torsemide Added    Bumetanide Removed   Vitamin D Removed   Gabapentin Dose adjusted (600 mg TID)   Ipratropium Removed   Oxycodone/APAP Removed   Hydrocodone/APAP Added   Prednisone Added   Monomethyl Fumarate (Bafiertam) Added   Potassium Chloride Removed   Valsartan Added Advair HFA Added   Dimethyl Fumarate (Tecfidera) Removed     Assessment:  Medication fill history was reviewed in [employer]  Prednisone 10 mg BID course ordered x 6 days on 2/6/2023      Eliseo Molina [address] [place] [address] PharmD, BCPS 2/10/2023 9:47 AM

## 2023-02-10 NOTE — PROGRESS NOTES
Department of Internal Medicine  PN    PCP: Darling Forrest DO  Admitting Physician: Dr. Nolan Francisco  Consultants:   Date of Service: 2/8/2023    CHIEF COMPLAINT: Altered mental status    HISTORY OF PRESENT ILLNESS:    Patient is 45-year-old male who presented to the ED with altered mental status. HPI obtained from staff and chart review. Patient lives in the basement apparently. Mother stated that she has a room overhead where her son stays. States that she heard her son talking nonsensically and repeating phrases. She confronted him and tried to assist him. Later in the day patient went to the bathroom however did not come out and mother became concerned and called EMS. .  On presentation patient was able to answer questions although confused. patient was intubated due to protection of airway and need for further evaluation in the setting of agitation. 2/9/2023  Patient seen examined on ICU. Patient's family members at the bedside and case discussed. Case discussed with patient's nurse at the bedside. BUN/creatinine 16/1.0 with fasting blood sugar 195. Procalcitonin was 0.04. Drug screen positive for opiates with the patient on Percocet at home. WBC 16.7 hemoglobin 13.6. Urinalysis is unremarkable. Temperature 99 with heart rate 71 blood pressure 132/87. O2 sat 97% with the patient on assist control ventilator with a rate of 20 and 8 of PEEP and FiO2 55%. Urinary output is adequate. 2/10/2023  Patient seen examined in ICU. Patient still intubated and sedated. Patient currently with NG tube feedings. BUN/creatinine 15/1.1 with liver enzymes normal.  WBC is 9.8 with hemoglobin 12.3. Viral respiratory panel was negative. Temperature is 100 degrees with heart rate of 69 blood pressure 134/83. O2 sat 98% with the patient on assist control ventilator with a rate of 20 and 8 of PEEP with FiO2 45%. Urine output about 550 cc a shift.   Chest x-ray this morning shows unchanged atelectasis and/or infiltrate in the right base. PAST MEDICAL Hx:  Past Medical History:   Diagnosis Date    Dermatophytosis 1/12/2023    Hyperlipidemia     Hypertension     Leg pain     Leg swelling 1/12/2023    Lymphedema     Lymphedema of both lower extremities 1/12/2023    MS (multiple sclerosis) ([facility] [address]     Multiple sclerosis ([facility] [address]     Multiple sclerosis ([facility] [address] 1/12/2023    With significant weakness of both legs follows up with [facility]    Spinal stenosis, lumbar     Thyroid disease     Tinea pedis of both feet 1/12/2023       PAST SURGICAL Hx:   Past Surgical History:   Procedure Laterality Date    HERNIA REPAIR      TONSILLECTOMY         FAMILY Hx:  No family history on file. HOME MEDICATIONS:  Prior to Admission medications    Medication Sig Start Date End Date Taking? Authorizing Provider   levothyroxine (SYNTHROID) 175 MCG tablet Take 175 mcg by mouth every morning (before breakfast) 8/27/18  Yes Historical Provider, MD   metFORMIN (GLUCOPHAGE) 500 MG tablet Take 500 mg by mouth 2 times daily 3/4/22  Yes Historical Provider, MD   Monomethyl Fumarate (BAFIERTAM) 95 MG CPDR Take 190 mg by mouth 2 times daily 1/24/23 1/24/24 Yes Historical Provider, MD   ADVAIR -21 MCG/ACT inhaler Inhale 2 puffs into the lungs in the morning and at bedtime 2/8/23   Historical Provider, MD   gabapentin (NEURONTIN) 600 MG tablet Take 1 tablet by mouth in the morning, at noon, and at bedtime. 12/26/22   Historical Provider, MD   HYDROcodone-acetaminophen (NORCO)  MG per tablet Take 1 tablet by mouth every 6 hours as needed.  2/6/23   Historical Provider, MD   predniSONE (DELTASONE) 10 MG tablet Take 1 tablet by mouth in the morning and at bedtime 2/6/23 2/11/23  Historical Provider, MD   torsemide (DEMADEX) 20 MG tablet Take 1 tablet by mouth daily 12/31/22   Historical Provider, MD   valsartan (DIOVAN) 160 MG tablet Take 1 tablet by mouth daily 12/5/22   Historical Provider, MD   miconazole nitrate 2 % OINT Apply topically 2 times daily Apply to the toes in between the toes both feet and both calfs up to the knee twice a day for 1 month 1/19/23   Lawanda Holcomb MD   spironolactone (ALDACTONE) 25 MG tablet Take 25 mg by mouth daily    Historical Provider, MD   pramipexole (MIRAPEX) 0.125 MG tablet Take 0.125 mg by mouth 3 times daily    Historical Provider, MD   baclofen (LIORESAL) 20 MG tablet Take 20 mg by mouth 3 times daily    Historical Provider, MD   metoprolol tartrate (LOPRESSOR) 25 MG tablet Take 25 mg by mouth 2 times daily    Historical Provider, MD   terbinafine (LAMISIL) 250 MG tablet Take 1 tablet by mouth daily 1/12/23 2/11/23  Lawanda Holcomb MD   vitamin E 1000 UNITS capsule Take 1,000 Units by mouth daily. Historical Provider, MD   loratadine (CLARITIN) 10 MG tablet Take 10 mg by mouth daily.       Historical Provider, MD       ALLERGIES:  Bactrim [sulfamethoxazole-trimethoprim], Penicillins, and Sulfa antibiotics    SOCIAL Hx:  Social History     Socioeconomic History    Marital status:      Spouse name: Not on file    Number of children: Not on file    Years of education: Not on file    Highest education level: Not on file   Occupational History    Not on file   Tobacco Use    Smoking status: Every Day     Packs/day: 1.00     Types: Cigarettes    Smokeless tobacco: Never   Substance and Sexual Activity    Alcohol use: Yes     Comment: rarely    Drug use: Not Currently     Types: Marijuana Sahra Forte)     Comment: edible    Sexual activity: Not on file   Other Topics Concern    Not on file   Social History Narrative    ** Merged History Encounter **          Social Determinants of Health     Financial Resource Strain: Not on file   Food Insecurity: Not on file   Transportation Needs: Not on file   Physical Activity: Not on file   Stress: Not on file   Social Connections: Not on file   Intimate Partner Violence: Not on file   Housing Stability: Not on file       ROS: Positive in bold  General:   Denies chills, fatigue, fever, malaise, night sweats or weight loss    Psychological:   Denies anxiety, disorientation or hallucinations    ENT:    Denies epistaxis, headaches, vertigo or visual changes    Cardiovascular:   Denies any chest pain, irregular heartbeats, or palpitations. No paroxysmal nocturnal dyspnea. Respiratory:   Denies shortness of breath, coughing, sputum production, hemoptysis, or wheezing. No orthopnea. Gastrointestinal:   Denies nausea, vomiting, diarrhea, or constipation. Denies any abdominal pain. Denies change in bowel habits or stools. Genito-Urinary:    Denies any urgency, frequency, hematuria. Voiding without difficulty. Musculoskeletal:   Denies joint pain, joint stiffness, joint swelling or muscle pain    Neurology:    Denies any headache or focal neurological deficits. No weakness or paresthesia. Derm:    Denies any rashes, ulcers, or excoriations. Denies bruising. Extremities:   Denies any lower extremity swelling or edema. PHYSICAL EXAM: Abnormal findings noted  VITALS:  Vitals:    02/10/23 0900   BP: 134/83   Pulse: 69   Resp: 20   Temp:    SpO2: 98%         CONSTITUTIONAL:    Awake, alert, cooperative, no apparent distress, and appears stated age    Patient is intubated and sedated    EYES:    sclera clear, conjunctiva normal    ENT:    Normocephalic, atraumatic,  External ears without lesions. Patient has OG tube and ET tube in place    NECK:    Supple, symmetrical, trachea midline,no JVD    HEMATOLOGIC/LYMPHATICS:    No cervical lymphadenopathy and no supraclavicular lymphadenopathy    LUNGS:    coarse decreased breath sounds to auscultation bilaterally, no wheezes, rhonchi, or rales,     CARDIOVASCULAR:    Normal apical impulse, regular rate and rhythm, normal S1 and S2, no S3 or S4, and no murmur noted    ABDOMEN:    , soft, non-distended, non-tender, morbidly obese    MUSCULOSKELETAL:    There is no redness, warmth, or swelling of the joints. NEUROLOGIC:    Awake, alert, oriented to name, place and time. Patient currently intubated and sedated    SKIN:    No bruising or bleeding. No redness, warmth, or swelling    EXTREMITIES:    Peripheral pulses present. No edema, cyanosis, or swelling.   Patient has bilateral lower extremity edema    LINES/CATHETERS   Patient has right IJ CVC  Whitaker catheter in place    LABORATORY DATA:  CBC with Differential:    Lab Results   Component Value Date/Time    WBC 9.8 02/10/2023 04:35 AM    RBC 4.03 02/10/2023 04:35 AM    HGB 12.3 02/10/2023 04:35 AM    HCT 39.8 02/10/2023 04:35 AM     02/10/2023 04:35 AM    MCV 98.8 02/10/2023 04:35 AM    MCH 30.5 02/10/2023 04:35 AM    MCHC 30.9 02/10/2023 04:35 AM    RDW 13.2 02/10/2023 04:35 AM    SEGSPCT 73 03/12/2014 08:10 AM    LYMPHOPCT 15.0 02/10/2023 04:35 AM    MONOPCT 9.4 02/10/2023 04:35 AM    EOSPCT 2 10/21/2010 10:25 AM    BASOPCT 0.8 02/10/2023 04:35 AM    MONOSABS 0.92 02/10/2023 04:35 AM    LYMPHSABS 1.47 02/10/2023 04:35 AM    EOSABS 0.06 02/10/2023 04:35 AM    BASOSABS 0.08 02/10/2023 04:35 AM     CMP:    Lab Results   Component Value Date/Time     02/10/2023 04:35 AM    K 3.6 02/10/2023 04:35 AM    K 4.1 02/08/2023 02:33 PM     02/10/2023 04:35 AM    CO2 29 02/10/2023 04:35 AM    BUN 15 02/10/2023 04:35 AM    CREATININE 1.1 02/10/2023 04:35 AM    GFRAA >60 07/31/2019 09:44 AM    LABGLOM >60 02/10/2023 04:35 AM    GLUCOSE 161 02/10/2023 04:35 AM    GLUCOSE 99 05/30/2012 08:37 AM    PROT 5.9 02/10/2023 04:35 AM    LABALBU 3.3 02/10/2023 04:35 AM    LABALBU 4.2 05/30/2012 08:37 AM    CALCIUM 8.1 02/10/2023 04:35 AM    BILITOT 0.2 02/10/2023 04:35 AM    ALKPHOS 82 02/10/2023 04:35 AM    AST 7 02/10/2023 04:35 AM    ALT 8 02/10/2023 04:35 AM       ASSESSMENT/PLAN:  Acute hypoxemic and hypercapnic respiratory failure requiring mechanical ventilation  Exacerbation of COPD with current tobacco abuse  Encephalopathy rule out meningitis  Community-acquired pneumonia  History of multiple sclerosis since age 28  Hypertension  Hyperlipidemia  Obesity hypoventilation syndrome  History of alcohol and drug abuse  Morbid obesity with BMI 56.60 kg/m²  Hypothyroidism      Patient presented with altered mental status. Patient became increasingly more confused and agitated. Patient was intubated in the ED. There is concern for meningitis. However lumbar puncture was unable to be performed. Patient placed on acyclovir, meropenem, vancomycin. Patient is immunocompromise in the setting of Tecfidera for his MS. Cultures ordered. Consider ID consultation. IR has been consulted for lumbar puncture. Critical care consulted and patient accepted to ICU. Further evaluation and management per critical care. Patient is awaiting transfer to [facility] pending bed availability.    -Home medication reviewed  -N.p.o. with patient intubated  -Lactated Ringer's at 75 cc an hour  -DuoNeb aerosols 4 times daily  -Pulmicort aerosol twice daily  -NG tube feedings    -BMP, CBC in a.mrFances Adrian DO  11:43 AM  2/10/2023

## 2023-02-11 LAB
AADO2: 165.9 MMHG
ALBUMIN SERPL-MCNC: 3.3 G/DL (ref 3.5–5.2)
ALP BLD-CCNC: 82 U/L (ref 40–129)
ALT SERPL-CCNC: 13 U/L (ref 0–40)
ANION GAP SERPL CALCULATED.3IONS-SCNC: 8 MMOL/L (ref 7–16)
AST SERPL-CCNC: 16 U/L (ref 0–39)
B.E.: 0.9 MMOL/L (ref -3–3)
BASOPHILS ABSOLUTE: 0.07 E9/L (ref 0–0.2)
BASOPHILS RELATIVE PERCENT: 0.9 % (ref 0–2)
BILIRUB SERPL-MCNC: 0.2 MG/DL (ref 0–1.2)
BUN BLDV-MCNC: 10 MG/DL (ref 6–20)
CALCIUM SERPL-MCNC: 8.5 MG/DL (ref 8.6–10.2)
CHLORIDE BLD-SCNC: 107 MMOL/L (ref 98–107)
CO2: 28 MMOL/L (ref 22–29)
COHB: 0 % (ref 0–1.5)
CREAT SERPL-MCNC: 0.9 MG/DL (ref 0.7–1.2)
CRITICAL: ABNORMAL
CULTURE, RESPIRATORY: NORMAL
DATE ANALYZED: ABNORMAL
DATE OF COLLECTION: ABNORMAL
EOSINOPHILS ABSOLUTE: 0.15 E9/L (ref 0.05–0.5)
EOSINOPHILS RELATIVE PERCENT: 1.9 % (ref 0–6)
FIO2: 40 %
GFR SERPL CREATININE-BSD FRML MDRD: >60 ML/MIN/1.73
GLUCOSE BLD-MCNC: 178 MG/DL (ref 74–99)
HCO3: 25.6 MMOL/L (ref 22–26)
HCT VFR BLD CALC: 40.5 % (ref 37–54)
HEMOGLOBIN: 12.6 G/DL (ref 12.5–16.5)
HHB: 8 % (ref 0–5)
IMMATURE GRANULOCYTES #: 0.04 E9/L
IMMATURE GRANULOCYTES %: 0.5 % (ref 0–5)
LAB: ABNORMAL
LYMPHOCYTES ABSOLUTE: 1.34 E9/L (ref 1.5–4)
LYMPHOCYTES RELATIVE PERCENT: 16.9 % (ref 20–42)
Lab: ABNORMAL
MAGNESIUM: 2.4 MG/DL (ref 1.6–2.6)
MCH RBC QN AUTO: 30.4 PG (ref 26–35)
MCHC RBC AUTO-ENTMCNC: 31.1 % (ref 32–34.5)
MCV RBC AUTO: 97.6 FL (ref 80–99.9)
METHB: 0.5 % (ref 0–1.5)
MODE: ABNORMAL
MONOCYTES ABSOLUTE: 0.7 E9/L (ref 0.1–0.95)
MONOCYTES RELATIVE PERCENT: 8.8 % (ref 2–12)
NEUTROPHILS ABSOLUTE: 5.61 E9/L (ref 1.8–7.3)
NEUTROPHILS RELATIVE PERCENT: 71 % (ref 43–80)
O2 CONTENT: 17.5 ML/DL
O2 SATURATION: 92 % (ref 92–98.5)
O2HB: 91.5 % (ref 94–97)
OPERATOR ID: 1821
PATIENT TEMP: 37 C
PCO2: 41.2 MMHG (ref 35–45)
PDW BLD-RTO: 13.3 FL (ref 11.5–15)
PEEP/CPAP: 5 CMH2O
PFO2: 1.55 MMHG/%
PH BLOOD GAS: 7.41 (ref 7.35–7.45)
PHOSPHORUS: 2.7 MG/DL (ref 2.5–4.5)
PLATELET # BLD: 276 E9/L (ref 130–450)
PMV BLD AUTO: 10.3 FL (ref 7–12)
PO2: 61.9 MMHG (ref 75–100)
POTASSIUM SERPL-SCNC: 4 MMOL/L (ref 3.5–5)
PS: 5 CMH20
RBC # BLD: 4.15 E12/L (ref 3.8–5.8)
RI(T): 2.68
SMEAR, RESPIRATORY: NORMAL
SODIUM BLD-SCNC: 143 MMOL/L (ref 132–146)
SOURCE, BLOOD GAS: ABNORMAL
THB: 13.6 G/DL (ref 11.5–16.5)
TIME ANALYZED: 1319
TOTAL PROTEIN: 6 G/DL (ref 6.4–8.3)
URINE CULTURE, ROUTINE: NORMAL
VANCOMYCIN TROUGH: 14.2 MCG/ML (ref 5–16)
WBC # BLD: 7.9 E9/L (ref 4.5–11.5)

## 2023-02-11 PROCEDURE — 6360000002 HC RX W HCPCS: Performed by: INTERNAL MEDICINE

## 2023-02-11 PROCEDURE — 6370000000 HC RX 637 (ALT 250 FOR IP): Performed by: INTERNAL MEDICINE

## 2023-02-11 PROCEDURE — 36592 COLLECT BLOOD FROM PICC: CPT

## 2023-02-11 PROCEDURE — 83735 ASSAY OF MAGNESIUM: CPT

## 2023-02-11 PROCEDURE — 2580000003 HC RX 258

## 2023-02-11 PROCEDURE — 2580000003 HC RX 258: Performed by: INTERNAL MEDICINE

## 2023-02-11 PROCEDURE — 2580000003 HC RX 258: Performed by: STUDENT IN AN ORGANIZED HEALTH CARE EDUCATION/TRAINING PROGRAM

## 2023-02-11 PROCEDURE — 80202 ASSAY OF VANCOMYCIN: CPT

## 2023-02-11 PROCEDURE — 94640 AIRWAY INHALATION TREATMENT: CPT

## 2023-02-11 PROCEDURE — C9113 INJ PANTOPRAZOLE SODIUM, VIA: HCPCS

## 2023-02-11 PROCEDURE — 94003 VENT MGMT INPAT SUBQ DAY: CPT

## 2023-02-11 PROCEDURE — 2000000000 HC ICU R&B

## 2023-02-11 PROCEDURE — 6360000002 HC RX W HCPCS

## 2023-02-11 PROCEDURE — 80053 COMPREHEN METABOLIC PANEL: CPT

## 2023-02-11 PROCEDURE — 6360000002 HC RX W HCPCS: Performed by: STUDENT IN AN ORGANIZED HEALTH CARE EDUCATION/TRAINING PROGRAM

## 2023-02-11 PROCEDURE — 2500000003 HC RX 250 WO HCPCS: Performed by: INTERNAL MEDICINE

## 2023-02-11 PROCEDURE — A4216 STERILE WATER/SALINE, 10 ML: HCPCS

## 2023-02-11 PROCEDURE — 84100 ASSAY OF PHOSPHORUS: CPT

## 2023-02-11 PROCEDURE — 82805 BLOOD GASES W/O2 SATURATION: CPT

## 2023-02-11 PROCEDURE — 85025 COMPLETE CBC W/AUTO DIFF WBC: CPT

## 2023-02-11 RX ORDER — NICOTINE 21 MG/24HR
1 PATCH, TRANSDERMAL 24 HOURS TRANSDERMAL DAILY
Status: DISCONTINUED | OUTPATIENT
Start: 2023-02-11 | End: 2023-02-21 | Stop reason: HOSPADM

## 2023-02-11 RX ADMIN — Medication 10 ML: at 20:17

## 2023-02-11 RX ADMIN — Medication 0.6 MCG/KG/HR: at 20:17

## 2023-02-11 RX ADMIN — VANCOMYCIN HYDROCHLORIDE 1250 MG: 10 INJECTION, POWDER, LYOPHILIZED, FOR SOLUTION INTRAVENOUS at 00:00

## 2023-02-11 RX ADMIN — PRAMIPEXOLE DIHYDROCHLORIDE 0.12 MG: 0.12 TABLET ORAL at 08:48

## 2023-02-11 RX ADMIN — METOPROLOL TARTRATE 25 MG: 25 TABLET, FILM COATED ORAL at 08:48

## 2023-02-11 RX ADMIN — IPRATROPIUM BROMIDE AND ALBUTEROL SULFATE 1 AMPULE: .5; 2.5 SOLUTION RESPIRATORY (INHALATION) at 18:24

## 2023-02-11 RX ADMIN — Medication 1.5 MCG/KG/HR: at 06:48

## 2023-02-11 RX ADMIN — GABAPENTIN 300 MG: 300 CAPSULE ORAL at 08:48

## 2023-02-11 RX ADMIN — Medication 0.7 MCG/KG/HR: at 23:31

## 2023-02-11 RX ADMIN — Medication 0.7 MCG/KG/HR: at 15:41

## 2023-02-11 RX ADMIN — MEROPENEM 1000 MG: 1 INJECTION, POWDER, FOR SOLUTION INTRAVENOUS at 19:08

## 2023-02-11 RX ADMIN — VANCOMYCIN HYDROCHLORIDE 1250 MG: 10 INJECTION, POWDER, LYOPHILIZED, FOR SOLUTION INTRAVENOUS at 11:07

## 2023-02-11 RX ADMIN — MEROPENEM 1000 MG: 1 INJECTION, POWDER, FOR SOLUTION INTRAVENOUS at 11:06

## 2023-02-11 RX ADMIN — Medication 1.3 MCG/KG/HR: at 09:56

## 2023-02-11 RX ADMIN — IPRATROPIUM BROMIDE AND ALBUTEROL SULFATE 1 AMPULE: .5; 2.5 SOLUTION RESPIRATORY (INHALATION) at 14:51

## 2023-02-11 RX ADMIN — SODIUM CHLORIDE 2.5 MG/KG/HR: 9 INJECTION, SOLUTION INTRAVENOUS at 03:23

## 2023-02-11 RX ADMIN — Medication 0.7 MCG/KG/HR: at 12:18

## 2023-02-11 RX ADMIN — MEROPENEM 1000 MG: 1 INJECTION, POWDER, FOR SOLUTION INTRAVENOUS at 03:19

## 2023-02-11 RX ADMIN — IPRATROPIUM BROMIDE AND ALBUTEROL SULFATE 1 AMPULE: .5; 2.5 SOLUTION RESPIRATORY (INHALATION) at 00:42

## 2023-02-11 RX ADMIN — IPRATROPIUM BROMIDE AND ALBUTEROL SULFATE 1 AMPULE: .5; 2.5 SOLUTION RESPIRATORY (INHALATION) at 21:33

## 2023-02-11 RX ADMIN — IPRATROPIUM BROMIDE AND ALBUTEROL SULFATE 1 AMPULE: .5; 2.5 SOLUTION RESPIRATORY (INHALATION) at 10:10

## 2023-02-11 RX ADMIN — Medication 10 ML: at 08:49

## 2023-02-11 RX ADMIN — IPRATROPIUM BROMIDE AND ALBUTEROL SULFATE 1 AMPULE: .5; 2.5 SOLUTION RESPIRATORY (INHALATION) at 05:28

## 2023-02-11 RX ADMIN — SODIUM CHLORIDE, PRESERVATIVE FREE 40 MG: 5 INJECTION INTRAVENOUS at 08:48

## 2023-02-11 RX ADMIN — PROPOFOL 50 MCG/KG/MIN: 10 INJECTION, EMULSION INTRAVENOUS at 00:49

## 2023-02-11 RX ADMIN — ENOXAPARIN SODIUM 60 MG: 100 INJECTION SUBCUTANEOUS at 08:48

## 2023-02-11 RX ADMIN — BUDESONIDE 500 MCG: 0.5 SUSPENSION RESPIRATORY (INHALATION) at 18:25

## 2023-02-11 RX ADMIN — VANCOMYCIN HYDROCHLORIDE 1250 MG: 10 INJECTION, POWDER, LYOPHILIZED, FOR SOLUTION INTRAVENOUS at 17:10

## 2023-02-11 RX ADMIN — LEVOTHYROXINE SODIUM 175 MCG: 0.17 TABLET ORAL at 05:05

## 2023-02-11 RX ADMIN — PROPOFOL 50 MCG/KG/MIN: 10 INJECTION, EMULSION INTRAVENOUS at 06:55

## 2023-02-11 RX ADMIN — ENOXAPARIN SODIUM 60 MG: 100 INJECTION SUBCUTANEOUS at 21:25

## 2023-02-11 RX ADMIN — BUDESONIDE 500 MCG: 0.5 SUSPENSION RESPIRATORY (INHALATION) at 05:28

## 2023-02-11 RX ADMIN — Medication 1.5 MCG/KG/HR: at 05:30

## 2023-02-11 RX ADMIN — BACLOFEN 10 MG: 10 TABLET ORAL at 08:48

## 2023-02-11 RX ADMIN — Medication 1.5 MCG/KG/HR: at 08:18

## 2023-02-11 ASSESSMENT — PULMONARY FUNCTION TESTS
PIF_VALUE: 24
PIF_VALUE: 15
PIF_VALUE: 24
PIF_VALUE: 24
PIF_VALUE: 27
PIF_VALUE: 24
PIF_VALUE: 25
PIF_VALUE: 25
PIF_VALUE: 23
PIF_VALUE: 25
PIF_VALUE: 23

## 2023-02-11 NOTE — PROGRESS NOTES
Pharmacy Consultation Note  (Antibiotic Dosing and Monitoring)    Initial consult date: 02/08/2023  Consulting physician/provider: Rice  Drug: Vancomycin  Indication: Central Nervous System Infection     Age/  Gender Height Weight IBW  Allergy Information   57 y.o./male 5' 10\" (177.8 cm) (!) 380 lb (172.4 kg)     Ideal body weight: 73 kg (160 lb 15 oz)  Adjusted ideal body weight: 118.6 kg (261 lb 7.1 oz)   Bactrim [sulfamethoxazole-trimethoprim], Penicillins, and Sulfa antibiotics      Renal Function:  Recent Labs     02/09/23  0422 02/10/23  0435 02/11/23  0532   BUN 16 15 10   CREATININE 1.0 1.1 0.9         Intake/Output Summary (Last 24 hours) at 2/11/2023 1302  Last data filed at 2/11/2023 0949  Gross per 24 hour   Intake 5524.46 ml   Output 3050 ml   Net 2474.46 ml       Vancomycin Monitoring:  Trough:    Recent Labs     02/11/23  0757   VANCOTROUGH 14.2     Random:    Recent Labs     02/10/23  0435   VANCORANDOM 8.6         Recent Labs     02/08/23  1433   BLOODCULT2 24 Hours no growth        Historical Cultures:  No results found for: ORG  Recent Labs     02/08/23  1433   BC 24 Hours no growth     Vancomycin Administration Times:  Recent vancomycin administrations                     vancomycin (VANCOCIN) 1,250 mg in sodium chloride 0.9 % 250 mL IVPB (mg) 1,250 mg New Bag 02/11/23 1107     1,250 mg New Bag  0000     1,250 mg New Bag 02/10/23 1700     1,250 mg New Bag  0823    vancomycin (VANCOCIN) 1,000 mg in sodium chloride 0.9 % 250 mL IVPB ([id number]) (mg) 1,000 mg New Bag 02/09/23 2059     1,000 mg New Bag  0819    vancomycin (VANCOCIN) 3,000 mg in sodium chloride 0.9 % 500 mL IVPB (mg) 3,000 mg New Bag 02/08/23 2004                      Assessment:  Patient is a 65 y.o. male who has been initiated on vancomycin  Estimated Creatinine Clearance: 154 mL/min (based on SCr of 0.9 mg/dL).   To dose vancomycin, pharmacy will be utilizing [employer] calculation software for goal AUC/BEAU 400-600 mg/L-hr  2/10: Random AM level = 8.6 mcg/mL. AUC/BEAU 290 mg/L.hr. Est true trough of 4.5 mcg/mL.   2/11: Trough @ 0757 = 14.2 mcg/mL, AUC/BEAU 459 mg/L-hr    Plan:  Continue Vancomycin 1250 mg IV q8hr  Repeat level as needed  Will continue to monitor renal function   Pharmacy to follow    Karon Ramirez PharmD, BCPS 2/11/2023 1:03 PM   [phone]

## 2023-02-11 NOTE — PROGRESS NOTES
Department of Internal Medicine  PN    PCP: Jennifer Eldridge DO  Admitting Physician: Dr. Mckeon  Consultants:   Date of Service: 2/8/2023    CHIEF COMPLAINT: Altered mental status    HISTORY OF PRESENT ILLNESS:    Patient is 67-year-old male who presented to the ED with altered mental status. HPI obtained from staff and chart review. Patient lives in the basement apparently. Mother stated that she has a room overhead where her son stays. States that she heard her son talking nonsensically and repeating phrases. She confronted him and tried to assist him. Later in the day patient went to the bathroom however did not come out and mother became concerned and called EMS. .  On presentation patient was able to answer questions although confused. patient was intubated due to protection of airway and need for further evaluation in the setting of agitation. 2/9/2023  Patient seen examined on ICU. Patient's family members at the bedside and case discussed. Case discussed with patient's nurse at the bedside. BUN/creatinine 16/1.0 with fasting blood sugar 195. Procalcitonin was 0.04. Drug screen positive for opiates with the patient on Percocet at home. WBC 16.7 hemoglobin 13.6. Urinalysis is unremarkable. Temperature 99 with heart rate 71 blood pressure 132/87. O2 sat 97% with the patient on assist control ventilator with a rate of 20 and 8 of PEEP and FiO2 55%. Urinary output is adequate. 2/10/2023  Patient seen examined in ICU. Patient still intubated and sedated. Patient currently with NG tube feedings. BUN/creatinine 15/1.1 with liver enzymes normal.  WBC is 9.8 with hemoglobin 12.3. Viral respiratory panel was negative. Temperature is 100 degrees with heart rate of 69 blood pressure 134/83. O2 sat 98% with the patient on assist control ventilator with a rate of 20 and 8 of PEEP with FiO2 45%. Urine output about 550 cc a shift.   Chest x-ray this morning shows unchanged atelectasis and/or infiltrate in the right base. 2/11/2023  Patient seen and examined on ICU. Case discussed with patient's nurse at the bedside. BUN/creatinine was 10/0.9 with normal electrolytes. Transaminases normal with WBC with fasting blood sugar 178. CBC is normal.  Temperature 99.7 with heart rate 76 and blood pressure 151/90. O2 sat 97% with patient assist control ventilator with rate of 20 and 8 of PEEP and FiO2 40%. Urinary output is good. Case discussed with intensivist today. We will attempt weaning off ventilator today. PAST MEDICAL Hx:  Past Medical History:   Diagnosis Date    Dermatophytosis 1/12/2023    Hyperlipidemia     Hypertension     Leg pain     Leg swelling 1/12/2023    Lymphedema     Lymphedema of both lower extremities 1/12/2023    MS (multiple sclerosis) ([facility] [address]     Multiple sclerosis ([facility] [address]     Multiple sclerosis ([facility] [address] 1/12/2023    With significant weakness of both legs follows up with [facility]    Spinal stenosis, lumbar     Thyroid disease     Tinea pedis of both feet 1/12/2023       PAST SURGICAL Hx:   Past Surgical History:   Procedure Laterality Date    HERNIA REPAIR      TONSILLECTOMY         FAMILY Hx:  No family history on file. HOME MEDICATIONS:  Prior to Admission medications    Medication Sig Start Date End Date Taking? Authorizing Provider   levothyroxine (SYNTHROID) 175 MCG tablet Take 175 mcg by mouth every morning (before breakfast) 8/27/18  Yes Historical Provider, MD   metFORMIN (GLUCOPHAGE) 500 MG tablet Take 500 mg by mouth 2 times daily 3/4/22  Yes Historical Provider, MD   Monomethyl Fumarate (BAFIERTAM) 95 MG CPDR Take 190 mg by mouth 2 times daily 1/24/23 1/24/24 Yes Historical Provider, MD   ADVAIR -21 MCG/ACT inhaler Inhale 2 puffs into the lungs in the morning and at bedtime 2/8/23   Historical Provider, MD   gabapentin (NEURONTIN) 600 MG tablet Take 1 tablet by mouth in the morning, at noon, and at bedtime.  12/26/22   Historical Provider, MD HYDROcodone-acetaminophen (NORCO)  MG per tablet Take 1 tablet by mouth every 6 hours as needed. 2/6/23   Historical Provider, MD   predniSONE (DELTASONE) 10 MG tablet Take 1 tablet by mouth in the morning and at bedtime 2/6/23 2/11/23  Historical Provider, MD   torsemide (DEMADEX) 20 MG tablet Take 1 tablet by mouth daily 12/31/22   Historical Provider, MD   valsartan (DIOVAN) 160 MG tablet Take 1 tablet by mouth daily 12/5/22   Historical Provider, MD   miconazole nitrate 2 % OINT Apply topically 2 times daily Apply to the toes in between the toes both feet and both calfs up to the knee twice a day for 1 month 1/19/23   Dickson Sheppard MD   spironolactone (ALDACTONE) 25 MG tablet Take 25 mg by mouth daily    Historical Provider, MD   pramipexole (MIRAPEX) 0.125 MG tablet Take 0.125 mg by mouth 3 times daily    Historical Provider, MD   baclofen (LIORESAL) 20 MG tablet Take 20 mg by mouth 3 times daily    Historical Provider, MD   metoprolol tartrate (LOPRESSOR) 25 MG tablet Take 25 mg by mouth 2 times daily    Historical Provider, MD   terbinafine (LAMISIL) 250 MG tablet Take 1 tablet by mouth daily 1/12/23 2/11/23  Dickson Sheppard MD   vitamin E 1000 UNITS capsule Take 1,000 Units by mouth daily. Historical Provider, MD   loratadine (CLARITIN) 10 MG tablet Take 10 mg by mouth daily.       Historical Provider, MD       ALLERGIES:  Bactrim [sulfamethoxazole-trimethoprim], Penicillins, and Sulfa antibiotics    SOCIAL Hx:  Social History     Socioeconomic History    Marital status:      Spouse name: Not on file    Number of children: Not on file    Years of education: Not on file    Highest education level: Not on file   Occupational History    Not on file   Tobacco Use    Smoking status: Every Day     Packs/day: 1.00     Types: Cigarettes    Smokeless tobacco: Never   Substance and Sexual Activity    Alcohol use: Yes     Comment: rarely    Drug use: Not Currently     Types: Marijuana Samanta Roberts)     Comment: edible    Sexual activity: Not on file   Other Topics Concern    Not on file   Social History Narrative    ** Merged History Encounter **          Social Determinants of Health     Financial Resource Strain: Not on file   Food Insecurity: Not on file   Transportation Needs: Not on file   Physical Activity: Not on file   Stress: Not on file   Social Connections: Not on file   Intimate Partner Violence: Not on file   Housing Stability: Not on file       ROS: Positive in bold  General:   Denies chills, fatigue, fever, malaise, night sweats or weight loss    Psychological:   Denies anxiety, disorientation or hallucinations    ENT:    Denies epistaxis, headaches, vertigo or visual changes    Cardiovascular:   Denies any chest pain, irregular heartbeats, or palpitations. No paroxysmal nocturnal dyspnea. Respiratory:   Denies shortness of breath, coughing, sputum production, hemoptysis, or wheezing. No orthopnea. Gastrointestinal:   Denies nausea, vomiting, diarrhea, or constipation. Denies any abdominal pain. Denies change in bowel habits or stools. Genito-Urinary:    Denies any urgency, frequency, hematuria. Voiding without difficulty. Musculoskeletal:   Denies joint pain, joint stiffness, joint swelling or muscle pain    Neurology:    Denies any headache or focal neurological deficits. No weakness or paresthesia. Derm:    Denies any rashes, ulcers, or excoriations. Denies bruising. Extremities:   Denies any lower extremity swelling or edema. PHYSICAL EXAM: Abnormal findings noted  VITALS:  Vitals:    02/11/23 0900   BP: (!) 151/91   Pulse: 76   Resp: 20   Temp:    SpO2: 97%         CONSTITUTIONAL:    Awake, alert, cooperative, no apparent distress, and appears stated age    Patient is intubated and sedated    EYES:    sclera clear, conjunctiva normal    ENT:    Normocephalic, atraumatic,  External ears without lesions.    Patient has OG tube and ET tube in place    NECK:    Supple, symmetrical, trachea midline,no JVD    HEMATOLOGIC/LYMPHATICS:    No cervical lymphadenopathy and no supraclavicular lymphadenopathy    LUNGS:    coarse decreased breath sounds to auscultation bilaterally, no wheezes, rhonchi, or rales,     CARDIOVASCULAR:    Normal apical impulse, regular rate and rhythm, normal S1 and S2, no S3 or S4, and no murmur noted    ABDOMEN:    , soft, non-distended, non-tender, morbidly obese    MUSCULOSKELETAL:    There is no redness, warmth, or swelling of the joints. NEUROLOGIC:    Awake, alert, oriented to name, place and time. Patient currently intubated and sedated    SKIN:    No bruising or bleeding. No redness, warmth, or swelling    EXTREMITIES:    Peripheral pulses present. No edema, cyanosis, or swelling.   Patient has bilateral lower extremity edema    LINES/CATHETERS   Patient has right IJ CVC  Whitaker catheter in place    LABORATORY DATA:  CBC with Differential:    Lab Results   Component Value Date/Time    WBC 7.9 02/11/2023 05:32 AM    RBC 4.15 02/11/2023 05:32 AM    HGB 12.6 02/11/2023 05:32 AM    HCT 40.5 02/11/2023 05:32 AM     02/11/2023 05:32 AM    MCV 97.6 02/11/2023 05:32 AM    MCH 30.4 02/11/2023 05:32 AM    MCHC 31.1 02/11/2023 05:32 AM    RDW 13.3 02/11/2023 05:32 AM    SEGSPCT 73 03/12/2014 08:10 AM    LYMPHOPCT 16.9 02/11/2023 05:32 AM    MONOPCT 8.8 02/11/2023 05:32 AM    EOSPCT 2 10/21/2010 10:25 AM    BASOPCT 0.9 02/11/2023 05:32 AM    MONOSABS 0.70 02/11/2023 05:32 AM    LYMPHSABS 1.34 02/11/2023 05:32 AM    EOSABS 0.15 02/11/2023 05:32 AM    BASOSABS 0.07 02/11/2023 05:32 AM     CMP:    Lab Results   Component Value Date/Time     02/11/2023 05:32 AM    K 4.0 02/11/2023 05:32 AM    K 4.1 02/08/2023 02:33 PM     02/11/2023 05:32 AM    CO2 28 02/11/2023 05:32 AM    BUN 10 02/11/2023 05:32 AM    CREATININE 0.9 02/11/2023 05:32 AM    GFRAA >60 07/31/2019 09:44 AM    LABGLOM >60 02/11/2023 05:32 AM    GLUCOSE 178 02/11/2023 05:32 AM    GLUCOSE 99 05/30/2012 08:37 AM    PROT 6.0 02/11/2023 05:32 AM    LABALBU 3.3 02/11/2023 05:32 AM    LABALBU 4.2 05/30/2012 08:37 AM    CALCIUM 8.5 02/11/2023 05:32 AM    BILITOT 0.2 02/11/2023 05:32 AM    ALKPHOS 82 02/11/2023 05:32 AM    AST 16 02/11/2023 05:32 AM    ALT 13 02/11/2023 05:32 AM       ASSESSMENT/PLAN:  Acute hypoxemic and hypercapnic respiratory failure requiring mechanical ventilation  Exacerbation of COPD with current tobacco abuse  Encephalopathy rule out meningitis  Community-acquired pneumonia  History of multiple sclerosis since age 28  Hypertension  Hyperlipidemia  Obesity hypoventilation syndrome  History of alcohol and drug abuse  Morbid obesity with BMI 56.60 kg/m²  Hypothyroidism      Patient presented with altered mental status. Patient became increasingly more confused and agitated. Patient was intubated in the ED. There is concern for meningitis. However lumbar puncture was unable to be performed. Patient placed on acyclovir, meropenem, vancomycin. Patient is immunocompromise in the setting of [employer] for his MS. Cultures ordered. Consider ID consultation. IR has been consulted for lumbar puncture. Critical care consulted and patient accepted to ICU. Further evaluation and management per critical care. Patient is awaiting transfer to [facility] clinic pending bed availability.    -Home medication reviewed  -N.p.o. with patient intubated  -Lactated Ringer's at 75 cc an hour  -DuoNeb aerosols 4 times daily  -Pulmicort aerosol twice daily  -NG tube feedings    -BMP, CBC in a.m.     Abhishek Darnell, DO  10:49 AM  2/11/2023

## 2023-02-11 NOTE — PROGRESS NOTES
Patient was extubated at [phone]. Patient had a positive cuff leak and an RSBI of 88. Patient was extubated to a 7L nasal cannula and is currently saturating 91%. Patient was ordered BIPAP PRN and is on standby at bedside. Will continue to monitor.

## 2023-02-11 NOTE — PLAN OF CARE
Problem: Pain  Goal: Verbalizes/displays adequate comfort level or baseline comfort level  Outcome: Progressing     Problem: Safety - Medical Restraint  Goal: Remains free of injury from restraints (Restraint for Interference with Medical Device)  Description: INTERVENTIONS:  1. Determine that other, less restrictive measures have been tried or would not be effective before applying the restraint  2. Evaluate the patient's condition at the time of restraint application  3. Inform patient/family regarding the reason for restraint  4.  Q2H: Monitor safety, psychosocial status, comfort, nutrition and hydration  2/11/2023 1547 by Singh Perkins RN  Outcome: Completed  Flowsheets  Taken 2/11/2023 1203  Remains free of injury from restraints (restraint for interference with medical device): Every 2 hours: Monitor safety, psychosocial status, comfort, nutrition and hydration  Taken 2/11/2023 1200  Remains free of injury from restraints (restraint for interference with medical device): Every 2 hours: Monitor safety, psychosocial status, comfort, nutrition and hydration  Taken 2/11/2023 1000  Remains free of injury from restraints (restraint for interference with medical device): Every 2 hours: Monitor safety, psychosocial status, comfort, nutrition and hydration  Taken 2/11/2023 0800  Remains free of injury from restraints (restraint for interference with medical device): Every 2 hours: Monitor safety, psychosocial status, comfort, nutrition and hydration  2/11/2023 0615 by Taran Moise  Outcome: Progressing  Flowsheets  Taken 2/11/2023 0600 by Pinky Kunz RN  Remains free of injury from restraints (restraint for interference with medical device): Every 2 hours: Monitor safety, psychosocial status, comfort, nutrition and hydration  Taken 2/11/2023 0400 by Pinky Kunz RN  Remains free of injury from restraints (restraint for interference with medical device): Every 2 hours: Monitor safety, psychosocial status, comfort, nutrition and hydration  Taken 2/11/2023 0200 by Shari Moise  Remains free of injury from restraints (restraint for interference with medical device): Every 2 hours: Monitor safety, psychosocial status, comfort, nutrition and hydration  Taken 2/11/2023 0000 by Shari Moise  Remains free of injury from restraints (restraint for interference with medical device): Every 2 hours: Monitor safety, psychosocial status, comfort, nutrition and hydration  Taken 2/10/2023 2200 by Shari Moise  Remains free of injury from restraints (restraint for interference with medical device): Every 2 hours: Monitor safety, psychosocial status, comfort, nutrition and hydration  Taken 2/10/2023 2000 by Shari Moise  Remains free of injury from restraints (restraint for interference with medical device): Every 2 hours: Monitor safety, psychosocial status, comfort, nutrition and hydration     Problem: Skin/Tissue Integrity  Goal: Absence of new skin breakdown  Description: 1.  Monitor for areas of redness and/or skin breakdown  2.  Assess vascular access sites hourly  3.  Every 4-6 hours minimum:  Change oxygen saturation probe site  4.  Every 4-6 hours:  If on nasal continuous positive airway pressure, respiratory therapy assess nares and determine need for appliance change or resting period.  Outcome: Progressing     Problem: Respiratory - Adult  Goal: Achieves optimal ventilation and oxygenation  Outcome: Progressing     Problem: Genitourinary - Adult  Goal: Urinary catheter remains patent  2/11/2023 1547 by Mingo Garcia RN  Outcome: Progressing  2/11/2023 0615 by Shari Moise  Outcome: Progressing     Problem: Safety - Adult  Goal: Free from fall injury  Outcome: Progressing     Problem: ABCDS Injury Assessment  Goal: Absence of physical injury  Outcome: Progressing

## 2023-02-11 NOTE — PLAN OF CARE
Problem: Safety - Medical Restraint  Goal: Remains free of injury from restraints (Restraint for Interference with Medical Device)  Description: INTERVENTIONS:  1. Determine that other, less restrictive measures have been tried or would not be effective before applying the restraint  2. Evaluate the patient's condition at the time of restraint application  3. Inform patient/family regarding the reason for restraint  4.  Q2H: Monitor safety, psychosocial status, comfort, nutrition and hydration  Outcome: Progressing  Flowsheets  Taken 2/11/2023 0200  Remains free of injury from restraints (restraint for interference with medical device): Every 2 hours: Monitor safety, psychosocial status, comfort, nutrition and hydration  Taken 2/11/2023 0000  Remains free of injury from restraints (restraint for interference with medical device): Every 2 hours: Monitor safety, psychosocial status, comfort, nutrition and hydration  Taken 2/10/2023 2200  Remains free of injury from restraints (restraint for interference with medical device): Every 2 hours: Monitor safety, psychosocial status, comfort, nutrition and hydration  Taken 2/10/2023 2000  Remains free of injury from restraints (restraint for interference with medical device): Every 2 hours: Monitor safety, psychosocial status, comfort, nutrition and hydration     Problem: Genitourinary - Adult  Goal: Urinary catheter remains patent  Outcome: Progressing

## 2023-02-11 NOTE — PROGRESS NOTES
Assessment:  Acute hypoxemic and hypercapnic respiratory failure requiring mechanical ventilation  Exacerbation of COPD  Encephalopathy rule out meningitis  Community-acquired pneumonia  Obesity hypoventilation syndrome  History of multiple sclerosis since age 28  History of alcohol and drug abuse  Morbid obesity with BMI 56.60 kg/m²    Plan :   Mechanical ventilation with protective lung strategy  Daily sedation vacation and awakening trial  Continue with Precedex and wean as tolerated and attempt spontaneous breathing trial while on Precedex. Continue  Merrem, and vancomycin  Procalcitonin was 0.04 on admission which is infection very unlikely. DVT prophylaxis        History of Present Illness:   Patient is a 76-year-old male with a past medical history of hyperlipidemia, hypertension, lymphedema, multiple sclerosis, thyroid disease, alcohol abuse, drug abuse and lumbar spinal stenosis. He presented to the emergency department for altered mental status. The patient lives in his mother's house and stays in the basement. This morning around 5 AM he woke up his mother by talking very loudly she went down to investigate and he was acting strangely and talking gibberish. Later that morning he went into the bathroom and did not come out and she got concerned and called EMS. EMS did administer Narcan due to his altered mental status but there was no change in his mental status. On arrival to the emergency department he is alert and oriented to self and place only but able to make some sense but his condition had declined which required intubation to protect his airway. Arterial blood gases were drawn that showed a pH of 7.33, PCO2 55.9, HCO3 of 29.6, and a PO2 of 98. Urine drug screen was positive for opiates but he does take prescribed Percocet at home. Ethanol level less than 10. his WBCs were slightly elevated at 14.2. Urinalysis was unremarkable. CAT scan of the head was unremarkable.   CAT scan of the chest showed right lung atelectasis. Chest x-ray showed no acute process mild cardiomegaly. Upon review there is dense consolidation on the right lower lobe. February 8, 2023:  Patient is seen and examined at the bedside in the emergency department. He is currently intubated and sedated with fentanyl and propofol. His mother and his daughter is at the bedside. Patient has been accepted at the [facility] intensive care but there are no beds currently available. We will continue antibiotic therapy to cover central nervous system infection and community-acquired pneumonia. February 10, 2023:  Patient had an uneventful night however he remained sedated, intubated, mechanically ventilated. He remained afebrile. White blood cells count has improved. LP could not be performed due to his size however index of suspicion for meningitis is extremely low with no fever, no neck stiffness, normal procalcitonin. February 11, 2023:  Patient continues to be sedated, intubated, mechanically ventilated. He is slightly more awake and follows commands intermittently. He has no fever, chills, rigors. White blood cells count is improving.     Past Medical History:  Past Medical History:   Diagnosis Date    Dermatophytosis 1/12/2023    Hyperlipidemia     Hypertension     Leg pain     Leg swelling 1/12/2023    Lymphedema     Lymphedema of both lower extremities 1/12/2023    MS (multiple sclerosis) ([address]     Multiple sclerosis ([address]     Multiple sclerosis ([address] 1/12/2023    With significant weakness of both legs follows up with [facility]    Spinal stenosis, lumbar     Thyroid disease     Tinea pedis of both feet 1/12/2023      Past Surgical History:   Procedure Laterality Date    HERNIA REPAIR      TONSILLECTOMY         Family History:   @[facility]@    Allergies:         Bactrim [sulfamethoxazole-trimethoprim], Penicillins, and Sulfa antibiotics    Social history:  Social History     Socioeconomic History Marital status:      Spouse name: Not on file    Number of children: Not on file    Years of education: Not on file    Highest education level: Not on file   Occupational History    Not on file   Tobacco Use    Smoking status: Every Day     Packs/day: 1.00     Types: Cigarettes    Smokeless tobacco: Never   Substance and Sexual Activity    Alcohol use: Yes     Comment: rarely    Drug use: Not Currently     Types: Marijuana Lyndon Spina)     Comment: edible    Sexual activity: Not on file   Other Topics Concern    Not on file   Social History Narrative    ** Merged History Encounter **          Social Determinants of Health     Financial Resource Strain: Not on file   Food Insecurity: Not on file   Transportation Needs: Not on file   Physical Activity: Not on file   Stress: Not on file   Social Connections: Not on file   Intimate Partner Violence: Not on file   Housing Stability: Not on file       Current Medications:     Current Facility-Administered Medications:     vancomycin (VANCOCIN) 1,250 mg in sodium chloride 0.9 % 250 mL IVPB, 1,250 mg, IntraVENous, Q8H, MASHA Watkins CNP, Last Rate: 166.7 mL/hr at 02/11/23 1107, 1,250 mg at 02/11/23 1107    dexmedetomidine (PRECEDEX) 400 mcg in sodium chloride 0.9 % 100 mL infusion, 0.1-1.5 mcg/kg/hr, IntraVENous, Continuous, Erin Bowen MD, Last Rate: 32.3 mL/hr at 02/11/23 1137, 0.7 mcg/kg/hr at 02/11/23 1137    meropenem (MERREM) 1,000 mg in sodium chloride 0.9 % 100 mL IVPB ([id number]), 1,000 mg, IntraVENous, Q8H, Erin Bowen MD, Last Rate: 33.3 mL/hr at 02/11/23 1106, 1,000 mg at 02/11/23 1106    baclofen (LIORESAL) tablet 10 mg, 10 mg, Oral, TID, Erin Bowen MD, 10 mg at 02/11/23 0848    gabapentin (NEURONTIN) capsule 300 mg, 300 mg, Oral, BID, Erin Bowen MD, 300 mg at 02/11/23 0848    fentaNYL (SUBLIMAZE) injection 50 mcg, 50 mcg, IntraVENous, Q1H PRN, Erin Bowen MD, 50 mcg at 02/10/23 1805    propofol 3,000 mg in 300 mL infusion, 5-50 mcg/kg/min, IntraVENous, Continuous, Carolyn Denney DO, Last Rate: 51.7 mL/hr at 02/11/23 0949, 50 mcg/kg/min at 02/11/23 0949    glucose chewable tablet 16 g, 4 tablet, Oral, PRN, Hector Creed Manan, DO    dextrose bolus 10% 125 mL, 125 mL, IntraVENous, PRN **OR** dextrose bolus 10% 250 mL, 250 mL, IntraVENous, PRN, Ismail U Manan, DO    glucagon (rDNA) injection 1 mg, 1 mg, SubCUTAneous, PRN, Ismail U Manan, DO    dextrose 10 % infusion, , IntraVENous, Continuous PRN, Ismail U Manan, DO    sodium chloride flush 0.9 % injection 5-40 mL, 5-40 mL, IntraVENous, 2 times per day, Pam Merles, DO, 10 mL at 02/11/23 0849    sodium chloride flush 0.9 % injection 5-40 mL, 5-40 mL, IntraVENous, PRN, Pam Merles, DO    polyethylene glycol (GLYCOLAX) packet 17 g, 17 g, Oral, Daily PRN, Pam Merles, DO    acetaminophen (TYLENOL) tablet 650 mg, 650 mg, Oral, Q6H PRN, 650 mg at 02/10/23 2108 **OR** acetaminophen (TYLENOL) suppository 650 mg, 650 mg, Rectal, Q6H PRN, Pam Merles, DO    levothyroxine (SYNTHROID) tablet 175 mcg, 175 mcg, Oral, Daily, Pam Merles, DO, 175 mcg at 02/11/23 0505    metoprolol tartrate (LOPRESSOR) tablet 25 mg, 25 mg, Oral, BID, Ismail U Manan, DO, 25 mg at 02/11/23 0848    pramipexole (MIRAPEX) tablet 0.125 mg, 0.125 mg, Oral, TID, Ismail U Manan, DO, 0.125 mg at 02/11/23 0848    enoxaparin (LOVENOX) injection 60 mg, 60 mg, SubCUTAneous, BID, Emil Esparza MD, 60 mg at 02/11/23 0848    ipratropium-albuterol (DUONEB) nebulizer solution 1 ampule, 1 ampule, Inhalation, Q4H, Emil Esparza MD, 1 ampule at 02/11/23 1010    ketamine (KETALAR) 1,250 mg in sodium chloride 0.9 % 250 mL infusion, 0.05-2.5 mg/kg/hr (Ideal), IntraVENous, Continuous, Sherita Carias MD, Stopped at 02/11/23 1996    sodium chloride flush 0.9 % injection 5-40 mL, 5-40 mL, IntraVENous, 2 times per day, Carolyn Denney DO, 10 mL at 02/11/23 0990 sodium chloride flush 0.9 % injection 5-40 mL, 5-40 mL, IntraVENous, PRN, Conchetta Barefoot, DO    0.9 % sodium chloride infusion, , IntraVENous, PRN, Conchetta Barefoot, DO    pantoprazole (PROTONIX) 40 mg in sodium chloride (PF) 0.9 % 10 mL injection, 40 mg, IntraVENous, Daily, Krystal Marisol, APRN - CNP, 40 mg at 02/11/23 0848    budesonide (PULMICORT) nebulizer suspension 500 mcg, 0.5 mg, Nebulization, BID, Krystal Marisol, APRN - CNP, 500 mcg at 02/11/23 7518    Review of Systems:   Unable to perform due to patient intubated and sedated    Physical Exam:   Vital Signs:  BP (!) 151/91   Pulse 80   Temp 99.7 °F (37.6 °C) (Bladder)   Resp 20   Ht 5' 10\" (1.778 m)   Wt (!) 412 lb 3.2 oz (187 kg)   SpO2 97%   BMI 59.14 kg/m²     Input/Output: In: 5524.5 [I.V.:2554. 2; NG/GT:1721]  Out: 3050     Oxygen requirements: 50%    Ventilator Information:  Vent Information  Ventilator ID: [id number]  Equipment Changed: Airway securing device  Ventilator Initiate: Yes  Vent Mode: AC/VC (Vent physically checked; connections and ETT ok.)    General appearance: Intubated and sedated   HEENT: Atraumatic/normocephalic,  PATSY, pharynx clear, dry mucosa   Neck: Supple, no jugular venous distension, lymphadenopathy, thyromegaly or carotid bruits  Chest: Diminished bilateral, no wheezing, no crackles and no tenderness over ribs   Cardiovascular: Normal S1 , S2, regular rate and rhythm, no murmur, rub or gallop  Abdomen: Obese normal sounds present, soft,no hepatosplenomegaly, and no masses  Extremities: 2-3+ edema bilateral lower extremities. Pulses are equally present.    Skin: intact, no rashes   Neurologic: Intubated and sedated  Investigations:  Labs, radiological imaging and cardiac work up were reviewed        ICU NURSE PRACTITIONER NOTE OF PERSONAL INVOLVEMENT IN CARE  As the nurse practitioner, I certify that I personally reviewed the patient's history and personally examined the patient to confirm the physical findings described above, and that I reviewed the relevant imaging studies and available reports. I also discussed the differential diagnosis and all of the proposed management plans with the patient and individuals accompanying the patient to this visit. They had the opportunity to ask questions about the proposed management plans and to have those questions answered. This patient has a high probability of sudden, clinically significant deterioration, which requires the highest level of  preparedness to intervene urgently. I managed/supervised life or organ supporting interventions that required frequent  assessment. I devoted my full attention to the direct care of this patient for the amount of time indicated below. Time I spent with family or surrogate(s) is included only if the patient was incapable of providing the necessary information or participating in medical decision-making. Time devoted to teaching and to any procedures I billed separately is not included.     Critical Care Time: 42 minutes      Electronically signed by Harpreet Willis MD on 2/11/2023 at 11:54 AM

## 2023-02-12 ENCOUNTER — APPOINTMENT (OUTPATIENT)
Dept: GENERAL RADIOLOGY | Age: 58
DRG: 208 | End: 2023-02-12
Payer: MEDICARE

## 2023-02-12 LAB
AADO2: 261 MMHG
ALBUMIN SERPL-MCNC: 3.1 G/DL (ref 3.5–5.2)
ALP BLD-CCNC: 80 U/L (ref 40–129)
ALT SERPL-CCNC: 11 U/L (ref 0–40)
ANION GAP SERPL CALCULATED.3IONS-SCNC: 6 MMOL/L (ref 7–16)
AST SERPL-CCNC: 11 U/L (ref 0–39)
B.E.: 3.6 MMOL/L (ref -3–3)
BASOPHILS ABSOLUTE: 0.06 E9/L (ref 0–0.2)
BASOPHILS RELATIVE PERCENT: 0.8 % (ref 0–2)
BILIRUB SERPL-MCNC: 0.5 MG/DL (ref 0–1.2)
BUN BLDV-MCNC: 11 MG/DL (ref 6–20)
CALCIUM SERPL-MCNC: 8.3 MG/DL (ref 8.6–10.2)
CHLORIDE BLD-SCNC: 109 MMOL/L (ref 98–107)
CO2: 30 MMOL/L (ref 22–29)
COHB: 0.2 % (ref 0–1.5)
CREAT SERPL-MCNC: 0.8 MG/DL (ref 0.7–1.2)
CRITICAL: ABNORMAL
DATE ANALYZED: ABNORMAL
DATE OF COLLECTION: ABNORMAL
EOSINOPHILS ABSOLUTE: 0.28 E9/L (ref 0.05–0.5)
EOSINOPHILS RELATIVE PERCENT: 3.6 % (ref 0–6)
FIO2: 60 %
GFR SERPL CREATININE-BSD FRML MDRD: >60 ML/MIN/1.73
GLUCOSE BLD-MCNC: 149 MG/DL (ref 74–99)
HCO3: 28.6 MMOL/L (ref 22–26)
HCT VFR BLD CALC: 39.8 % (ref 37–54)
HEMOGLOBIN: 12.7 G/DL (ref 12.5–16.5)
HHB: 2.6 % (ref 0–5)
IMMATURE GRANULOCYTES #: 0.02 E9/L
IMMATURE GRANULOCYTES %: 0.3 % (ref 0–5)
LAB: ABNORMAL
LYMPHOCYTES ABSOLUTE: 1.01 E9/L (ref 1.5–4)
LYMPHOCYTES RELATIVE PERCENT: 13 % (ref 20–42)
Lab: ABNORMAL
MAGNESIUM: 2.2 MG/DL (ref 1.6–2.6)
MCH RBC QN AUTO: 31.5 PG (ref 26–35)
MCHC RBC AUTO-ENTMCNC: 31.9 % (ref 32–34.5)
MCV RBC AUTO: 98.8 FL (ref 80–99.9)
METHB: 0.4 % (ref 0–1.5)
MODE: ABNORMAL
MONOCYTES ABSOLUTE: 0.58 E9/L (ref 0.1–0.95)
MONOCYTES RELATIVE PERCENT: 7.5 % (ref 2–12)
NEUTROPHILS ABSOLUTE: 5.83 E9/L (ref 1.8–7.3)
NEUTROPHILS RELATIVE PERCENT: 74.8 % (ref 43–80)
O2 CONTENT: 18.6 ML/DL
O2 SATURATION: 97.4 % (ref 92–98.5)
O2HB: 96.8 % (ref 94–97)
OPERATOR ID: ABNORMAL
PATIENT TEMP: 37 C
PCO2: 44.6 MMHG (ref 35–45)
PDW BLD-RTO: 13 FL (ref 11.5–15)
PEEP/CPAP: 5 CMH2O
PFO2: 1.71 MMHG/%
PH BLOOD GAS: 7.42 (ref 7.35–7.45)
PHOSPHORUS: 2.8 MG/DL (ref 2.5–4.5)
PIP: 15 CMH2O
PLATELET # BLD: 239 E9/L (ref 130–450)
PMV BLD AUTO: 10 FL (ref 7–12)
PO2: 102.7 MMHG (ref 75–100)
POTASSIUM SERPL-SCNC: 4.1 MMOL/L (ref 3.5–5)
RBC # BLD: 4.03 E12/L (ref 3.8–5.8)
RI(T): 2.54
SODIUM BLD-SCNC: 145 MMOL/L (ref 132–146)
SOURCE, BLOOD GAS: ABNORMAL
THB: 13.6 G/DL (ref 11.5–16.5)
TIME ANALYZED: 449
TOTAL PROTEIN: 6.1 G/DL (ref 6.4–8.3)
WBC # BLD: 7.8 E9/L (ref 4.5–11.5)

## 2023-02-12 PROCEDURE — 2580000003 HC RX 258: Performed by: STUDENT IN AN ORGANIZED HEALTH CARE EDUCATION/TRAINING PROGRAM

## 2023-02-12 PROCEDURE — 94640 AIRWAY INHALATION TREATMENT: CPT

## 2023-02-12 PROCEDURE — 2500000003 HC RX 250 WO HCPCS: Performed by: INTERNAL MEDICINE

## 2023-02-12 PROCEDURE — 6370000000 HC RX 637 (ALT 250 FOR IP): Performed by: INTERNAL MEDICINE

## 2023-02-12 PROCEDURE — 2700000000 HC OXYGEN THERAPY PER DAY

## 2023-02-12 PROCEDURE — 87040 BLOOD CULTURE FOR BACTERIA: CPT

## 2023-02-12 PROCEDURE — C9113 INJ PANTOPRAZOLE SODIUM, VIA: HCPCS

## 2023-02-12 PROCEDURE — 94660 CPAP INITIATION&MGMT: CPT

## 2023-02-12 PROCEDURE — 80053 COMPREHEN METABOLIC PANEL: CPT

## 2023-02-12 PROCEDURE — 36600 WITHDRAWAL OF ARTERIAL BLOOD: CPT

## 2023-02-12 PROCEDURE — 85025 COMPLETE CBC W/AUTO DIFF WBC: CPT

## 2023-02-12 PROCEDURE — 83735 ASSAY OF MAGNESIUM: CPT

## 2023-02-12 PROCEDURE — 84100 ASSAY OF PHOSPHORUS: CPT

## 2023-02-12 PROCEDURE — 2580000003 HC RX 258: Performed by: INTERNAL MEDICINE

## 2023-02-12 PROCEDURE — 6360000002 HC RX W HCPCS

## 2023-02-12 PROCEDURE — 92610 EVALUATE SWALLOWING FUNCTION: CPT | Performed by: SPEECH-LANGUAGE PATHOLOGIST

## 2023-02-12 PROCEDURE — 6360000002 HC RX W HCPCS: Performed by: INTERNAL MEDICINE

## 2023-02-12 PROCEDURE — 2580000003 HC RX 258

## 2023-02-12 PROCEDURE — 36592 COLLECT BLOOD FROM PICC: CPT

## 2023-02-12 PROCEDURE — 82805 BLOOD GASES W/O2 SATURATION: CPT

## 2023-02-12 PROCEDURE — 2000000000 HC ICU R&B

## 2023-02-12 PROCEDURE — 71045 X-RAY EXAM CHEST 1 VIEW: CPT

## 2023-02-12 PROCEDURE — A4216 STERILE WATER/SALINE, 10 ML: HCPCS

## 2023-02-12 RX ORDER — LEVOFLOXACIN 5 MG/ML
750 INJECTION, SOLUTION INTRAVENOUS EVERY 24 HOURS
Status: DISCONTINUED | OUTPATIENT
Start: 2023-02-12 | End: 2023-02-15

## 2023-02-12 RX ADMIN — SODIUM CHLORIDE, PRESERVATIVE FREE 40 MG: 5 INJECTION INTRAVENOUS at 08:40

## 2023-02-12 RX ADMIN — BUDESONIDE 500 MCG: 0.5 SUSPENSION RESPIRATORY (INHALATION) at 04:57

## 2023-02-12 RX ADMIN — VANCOMYCIN HYDROCHLORIDE 1250 MG: 10 INJECTION, POWDER, LYOPHILIZED, FOR SOLUTION INTRAVENOUS at 18:14

## 2023-02-12 RX ADMIN — MEROPENEM 1000 MG: 1 INJECTION, POWDER, FOR SOLUTION INTRAVENOUS at 13:51

## 2023-02-12 RX ADMIN — ENOXAPARIN SODIUM 60 MG: 100 INJECTION SUBCUTANEOUS at 08:41

## 2023-02-12 RX ADMIN — Medication 0.7 MCG/KG/HR: at 06:31

## 2023-02-12 RX ADMIN — IPRATROPIUM BROMIDE AND ALBUTEROL SULFATE 1 AMPULE: .5; 2.5 SOLUTION RESPIRATORY (INHALATION) at 09:07

## 2023-02-12 RX ADMIN — IPRATROPIUM BROMIDE AND ALBUTEROL SULFATE 1 AMPULE: .5; 2.5 SOLUTION RESPIRATORY (INHALATION) at 22:06

## 2023-02-12 RX ADMIN — METOPROLOL TARTRATE 25 MG: 25 TABLET, FILM COATED ORAL at 21:20

## 2023-02-12 RX ADMIN — IPRATROPIUM BROMIDE AND ALBUTEROL SULFATE 1 AMPULE: .5; 2.5 SOLUTION RESPIRATORY (INHALATION) at 04:57

## 2023-02-12 RX ADMIN — Medication 0.6 MCG/KG/HR: at 18:16

## 2023-02-12 RX ADMIN — LEVOFLOXACIN 750 MG: 5 INJECTION, SOLUTION INTRAVENOUS at 13:37

## 2023-02-12 RX ADMIN — Medication 10 ML: at 08:44

## 2023-02-12 RX ADMIN — ENOXAPARIN SODIUM 60 MG: 100 INJECTION SUBCUTANEOUS at 21:07

## 2023-02-12 RX ADMIN — IPRATROPIUM BROMIDE AND ALBUTEROL SULFATE 1 AMPULE: .5; 2.5 SOLUTION RESPIRATORY (INHALATION) at 01:34

## 2023-02-12 RX ADMIN — FENTANYL CITRATE 50 MCG: 0.05 INJECTION, SOLUTION INTRAMUSCULAR; INTRAVENOUS at 19:56

## 2023-02-12 RX ADMIN — Medication 10 ML: at 19:55

## 2023-02-12 RX ADMIN — Medication 0.6 MCG/KG/HR: at 22:19

## 2023-02-12 RX ADMIN — VANCOMYCIN HYDROCHLORIDE 1250 MG: 10 INJECTION, POWDER, LYOPHILIZED, FOR SOLUTION INTRAVENOUS at 01:41

## 2023-02-12 RX ADMIN — BUDESONIDE 500 MCG: 0.5 SUSPENSION RESPIRATORY (INHALATION) at 18:43

## 2023-02-12 RX ADMIN — IPRATROPIUM BROMIDE AND ALBUTEROL SULFATE 1 AMPULE: .5; 2.5 SOLUTION RESPIRATORY (INHALATION) at 14:33

## 2023-02-12 RX ADMIN — Medication 10 ML: at 19:49

## 2023-02-12 RX ADMIN — ACETAMINOPHEN 650 MG: 325 TABLET ORAL at 18:09

## 2023-02-12 RX ADMIN — MEROPENEM 1000 MG: 1 INJECTION, POWDER, FOR SOLUTION INTRAVENOUS at 19:49

## 2023-02-12 RX ADMIN — VANCOMYCIN HYDROCHLORIDE 1250 MG: 10 INJECTION, POWDER, LYOPHILIZED, FOR SOLUTION INTRAVENOUS at 08:48

## 2023-02-12 RX ADMIN — Medication 0.6 MCG/KG/HR: at 09:46

## 2023-02-12 RX ADMIN — IPRATROPIUM BROMIDE AND ALBUTEROL SULFATE 1 AMPULE: .5; 2.5 SOLUTION RESPIRATORY (INHALATION) at 18:43

## 2023-02-12 RX ADMIN — Medication 0.7 MCG/KG/HR: at 03:16

## 2023-02-12 RX ADMIN — MEROPENEM 1000 MG: 1 INJECTION, POWDER, FOR SOLUTION INTRAVENOUS at 05:38

## 2023-02-12 RX ADMIN — Medication 0.6 MCG/KG/HR: at 13:44

## 2023-02-12 ASSESSMENT — PAIN DESCRIPTION - ORIENTATION: ORIENTATION: RIGHT;LEFT

## 2023-02-12 ASSESSMENT — PAIN DESCRIPTION - DESCRIPTORS: DESCRIPTORS: SHARP

## 2023-02-12 ASSESSMENT — PAIN DESCRIPTION - LOCATION: LOCATION: BACK;LEG

## 2023-02-12 NOTE — PLAN OF CARE
Problem: Skin/Tissue Integrity  Goal: Absence of new skin breakdown  Description: 1. Monitor for areas of redness and/or skin breakdown  2. Assess vascular access sites hourly  3. Every 4-6 hours minimum:  Change oxygen saturation probe site  4. Every 4-6 hours:  If on nasal continuous positive airway pressure, respiratory therapy assess nares and determine need for appliance change or resting period.   Outcome: Progressing     Problem: Nutrition Deficit:  Goal: Optimize nutritional status  Outcome: Progressing     Problem: Respiratory - Adult  Goal: Achieves optimal ventilation and oxygenation  Outcome: Progressing     Problem: Safety - Adult  Goal: Free from fall injury  Outcome: Progressing

## 2023-02-12 NOTE — PLAN OF CARE
Problem: Pain  Goal: Verbalizes/displays adequate comfort level or baseline comfort level  Outcome: Progressing     Problem: Skin/Tissue Integrity  Goal: Absence of new skin breakdown  Description: 1. Monitor for areas of redness and/or skin breakdown  2. Assess vascular access sites hourly  3. Every 4-6 hours minimum:  Change oxygen saturation probe site  4. Every 4-6 hours:  If on nasal continuous positive airway pressure, respiratory therapy assess nares and determine need for appliance change or resting period.   2/12/2023 1446 by Montse Lopez RN  Outcome: Progressing  2/12/2023 0642 by Miriam Chavez RN  Outcome: Progressing     Problem: Nutrition Deficit:  Goal: Optimize nutritional status  2/12/2023 0642 by Miriam Chavez RN  Outcome: Progressing     Problem: Respiratory - Adult  Goal: Achieves optimal ventilation and oxygenation  2/12/2023 0642 by Miriam Chavez RN  Outcome: Progressing     Problem: Genitourinary - Adult  Goal: Urinary catheter remains patent  Outcome: Progressing     Problem: Safety - Adult  Goal: Free from fall injury  2/12/2023 1446 by Montse Lopez RN  Outcome: Progressing  2/12/2023 0642 by Miriam Chavez RN  Outcome: Progressing     Problem: ABCDS Injury Assessment  Goal: Absence of physical injury  Outcome: Progressing

## 2023-02-12 NOTE — PROGRESS NOTES
Pharmacy Consultation Note  (Antibiotic Dosing and Monitoring)    Initial consult date: 02/08/2023  Consulting physician/provider: Rice  Drug: Vancomycin  Indication: Central Nervous System Infection     Age/  Gender Height Weight IBW  Allergy Information   57 y.o./male 5' 10\" (177.8 cm) (!) 380 lb (172.4 kg)     Ideal body weight: 73 kg (160 lb 15 oz)  Adjusted ideal body weight: 118.5 kg (261 lb 3.9 oz)   Bactrim [sulfamethoxazole-trimethoprim], Penicillins, and Sulfa antibiotics      Renal Function:  Recent Labs     02/10/23  0435 02/11/23  0532 02/12/23  0416   BUN 15 10 11   CREATININE 1.1 0.9 0.8         Intake/Output Summary (Last 24 hours) at 2/12/2023 1234  Last data filed at 2/12/2023 0700  Gross per 24 hour   Intake 1898.41 ml   Output 2550 ml   Net -651.59 ml       Vancomycin Monitoring:  Trough:    Recent Labs     02/11/23  0757   VANCOTROUGH 14.2       Random:    Recent Labs     02/10/23  0435   VANCORANDOM 8.6         No results for input(s): Lisa Courts in the last 72 hours. Historical Cultures:  No results found for: ORG  No results for input(s): BC in the last 72 hours. Vancomycin Administration Times:  Recent vancomycin administrations                     vancomycin (VANCOCIN) 1,250 mg in sodium chloride 0.9 % 250 mL IVPB (mg) 1,250 mg New Bag 02/12/23 0848     1,250 mg New Bag  0141     1,250 mg New Bag 02/11/23 1710     1,250 mg New Bag  1107     1,250 mg New Bag  0000     1,250 mg New Bag 02/10/23 1700     1,250 mg New Bag  0823    vancomycin (VANCOCIN) 1,000 mg in sodium chloride 0.9 % 250 mL IVPB ([id number]) (mg) 1,000 mg New Bag 02/09/23 2059                    Assessment:  Patient is a 65 y.o. male who has been initiated on vancomycin  Estimated Creatinine Clearance: 173 mL/min (based on SCr of 0.8 mg/dL). To dose vancomycin, pharmacy will be utilizing [employer] calculation software for goal AUC/BEAU 400-600 mg/L-hr  2/10: Random AM level = 8.6 mcg/mL.  AUC/BEAU 290 mg/L.hr. Est true trough of 4.5 mcg/mL.   2/11: Trough @ 0757 = 14.2 mcg/mL, AUC/BEAU 459 mg/L-hr    Plan:  Continue Vancomycin 1250 mg IV q8hr  Repeat level as needed  Will continue to monitor renal function   Pharmacy to follow    Alex GilesD, BCPS 2/12/2023 12:34 PM   [phone]

## 2023-02-12 NOTE — PROGRESS NOTES
Department of Internal Medicine  PN    PCP: Amaya Castillo DO  Admitting Physician: Dr. Darin Diaz  Consultants:   Date of Service: 2/8/2023    CHIEF COMPLAINT: Altered mental status    HISTORY OF PRESENT ILLNESS:    Patient is 60-year-old male who presented to the ED with altered mental status. HPI obtained from staff and chart review. Patient lives in the basement apparently. Mother stated that she has a room overhead where her son stays. States that she heard her son talking nonsensically and repeating phrases. She confronted him and tried to assist him. Later in the day patient went to the bathroom however did not come out and mother became concerned and called EMS. .  On presentation patient was able to answer questions although confused. patient was intubated due to protection of airway and need for further evaluation in the setting of agitation. 2/9/2023  Patient seen examined on ICU. Patient's family members at the bedside and case discussed. Case discussed with patient's nurse at the bedside. BUN/creatinine 16/1.0 with fasting blood sugar 195. Procalcitonin was 0.04. Drug screen positive for opiates with the patient on Percocet at home. WBC 16.7 hemoglobin 13.6. Urinalysis is unremarkable. Temperature 99 with heart rate 71 blood pressure 132/87. O2 sat 97% with the patient on assist control ventilator with a rate of 20 and 8 of PEEP and FiO2 55%. Urinary output is adequate. 2/10/2023  Patient seen examined in ICU. Patient still intubated and sedated. Patient currently with NG tube feedings. BUN/creatinine 15/1.1 with liver enzymes normal.  WBC is 9.8 with hemoglobin 12.3. Viral respiratory panel was negative. Temperature is 100 degrees with heart rate of 69 blood pressure 134/83. O2 sat 98% with the patient on assist control ventilator with a rate of 20 and 8 of PEEP with FiO2 45%. Urine output about 550 cc a shift.   Chest x-ray this morning shows unchanged atelectasis and/or infiltrate in the right base. 2/11/2023  Patient seen and examined on ICU. Case discussed with patient's nurse at the bedside. BUN/creatinine was 10/0.9 with normal electrolytes. Transaminases normal with WBC with fasting blood sugar 178. CBC is normal.  Temperature 99.7 with heart rate 76 and blood pressure 151/90. O2 sat 97% with patient assist control ventilator with rate of 20 and 8 of PEEP and FiO2 40%. Urinary output is good. Case discussed with intensivist today. We will attempt weaning off ventilator today. 2/12/2023  Patient seen examined on ICU. Patient was extubated yesterday afternoon. Patient is alert oriented very very weak and still very short of breath with any activity. Case discussed with patient nurse at the bedside. BUN/creatinine 11/0.8 with normal electrolytes. Liver enzymes are normal with WBC 7.8 hemoglobin 12.7. Temperature 99.9 with heart rate 73 and blood pressure 137/86. O2 sat 95% with the patient on BiPAP with FiO2 50%. PAST MEDICAL Hx:  Past Medical History:   Diagnosis Date    Dermatophytosis 1/12/2023    Hyperlipidemia     Hypertension     Leg pain     Leg swelling 1/12/2023    Lymphedema     Lymphedema of both lower extremities 1/12/2023    MS (multiple sclerosis) ([facility]     Multiple sclerosis ([facility]     Multiple sclerosis ([facility] 1/12/2023    With significant weakness of both legs follows up with [facility]    Spinal stenosis, lumbar     Thyroid disease     Tinea pedis of both feet 1/12/2023       PAST SURGICAL Hx:   Past Surgical History:   Procedure Laterality Date    HERNIA REPAIR      TONSILLECTOMY         FAMILY Hx:  No family history on file. HOME MEDICATIONS:  Prior to Admission medications    Medication Sig Start Date End Date Taking?  Authorizing Provider   levothyroxine (SYNTHROID) 175 MCG tablet Take 175 mcg by mouth every morning (before breakfast) 8/27/18  Yes Historical Provider, MD   metFORMIN (GLUCOPHAGE) 500 MG tablet Take 500 mg by mouth 2 times daily 3/4/22  Yes Historical Provider, MD   Monomethyl Fumarate (BAFIERTAM) 95 MG CPDR Take 190 mg by mouth 2 times daily 1/24/23 1/24/24 Yes Historical Provider, MD   ADVAIR -21 MCG/ACT inhaler Inhale 2 puffs into the lungs in the morning and at bedtime 2/8/23   Historical Provider, MD   gabapentin (NEURONTIN) 600 MG tablet Take 1 tablet by mouth in the morning, at noon, and at bedtime. 12/26/22   Historical Provider, MD   HYDROcodone-acetaminophen (NORCO)  MG per tablet Take 1 tablet by mouth every 6 hours as needed. 2/6/23   Historical Provider, MD   torsemide (DEMADEX) 20 MG tablet Take 1 tablet by mouth daily 12/31/22   Historical Provider, MD   valsartan (DIOVAN) 160 MG tablet Take 1 tablet by mouth daily 12/5/22   Historical Provider, MD   miconazole nitrate 2 % OINT Apply topically 2 times daily Apply to the toes in between the toes both feet and both calfs up to the knee twice a day for 1 month 1/19/23   Amor Victor MD   spironolactone (ALDACTONE) 25 MG tablet Take 25 mg by mouth daily    Historical Provider, MD   pramipexole (MIRAPEX) 0.125 MG tablet Take 0.125 mg by mouth 3 times daily    Historical Provider, MD   baclofen (LIORESAL) 20 MG tablet Take 20 mg by mouth 3 times daily    Historical Provider, MD   metoprolol tartrate (LOPRESSOR) 25 MG tablet Take 25 mg by mouth 2 times daily    Historical Provider, MD   vitamin E 1000 UNITS capsule Take 1,000 Units by mouth daily. Historical Provider, MD   loratadine (CLARITIN) 10 MG tablet Take 10 mg by mouth daily.       Historical Provider, MD       ALLERGIES:  Bactrim [sulfamethoxazole-trimethoprim], Penicillins, and Sulfa antibiotics    SOCIAL Hx:  Social History     Socioeconomic History    Marital status:      Spouse name: Not on file    Number of children: Not on file    Years of education: Not on file    Highest education level: Not on file   Occupational History    Not on file   Tobacco Use    Smoking status: Every Day     Packs/day: 1.00     Types: Cigarettes    Smokeless tobacco: Never   Substance and Sexual Activity    Alcohol use: Yes     Comment: rarely    Drug use: Not Currently     Types: Marijuana Deadra Rohit)     Comment: edible    Sexual activity: Not on file   Other Topics Concern    Not on file   Social History Narrative    ** Merged History Encounter **          Social Determinants of Health     Financial Resource Strain: Not on file   Food Insecurity: Not on file   Transportation Needs: Not on file   Physical Activity: Not on file   Stress: Not on file   Social Connections: Not on file   Intimate Partner Violence: Not on file   Housing Stability: Not on file       ROS: Positive in bold  General:   Denies chills, fatigue, fever, malaise, night sweats or weight loss    Psychological:   Denies anxiety, disorientation or hallucinations    ENT:    Denies epistaxis, headaches, vertigo or visual changes    Cardiovascular:   Denies any chest pain, irregular heartbeats, or palpitations. No paroxysmal nocturnal dyspnea. Respiratory:   Denies shortness of breath, coughing, sputum production, hemoptysis, or wheezing. No orthopnea. Gastrointestinal:   Denies nausea, vomiting, diarrhea, or constipation. Denies any abdominal pain. Denies change in bowel habits or stools. Genito-Urinary:    Denies any urgency, frequency, hematuria. Voiding without difficulty. Musculoskeletal:   Denies joint pain, joint stiffness, joint swelling or muscle pain    Neurology:    Denies any headache or focal neurological deficits. No weakness or paresthesia. Derm:    Denies any rashes, ulcers, or excoriations. Denies bruising. Extremities:   Denies any lower extremity swelling or edema.       PHYSICAL EXAM: Abnormal findings noted  VITALS:  Vitals:    02/12/23 0916   BP:    Pulse:    Resp: 19   Temp:    SpO2:          CONSTITUTIONAL:    Awake, alert, cooperative, no apparent distress, and appears stated age    Patient is intubated and sedated    EYES:    sclera clear, conjunctiva normal    ENT:    Normocephalic, atraumatic,  External ears without lesions. Patient has OG tube and ET tube in place    NECK:    Supple, symmetrical, trachea midline,no JVD    HEMATOLOGIC/LYMPHATICS:    No cervical lymphadenopathy and no supraclavicular lymphadenopathy    LUNGS:    coarse decreased breath sounds to auscultation bilaterally, no wheezes, rhonchi, or rales,     CARDIOVASCULAR:    Normal apical impulse, regular rate and rhythm, normal S1 and S2, no S3 or S4, and no murmur noted    ABDOMEN:    , soft, non-distended, non-tender, morbidly obese    MUSCULOSKELETAL:    There is no redness, warmth, or swelling of the joints. NEUROLOGIC:    Awake, alert, oriented to name, place and time. Patient currently intubated and sedated    SKIN:    No bruising or bleeding. No redness, warmth, or swelling    EXTREMITIES:    Peripheral pulses present. No edema, cyanosis, or swelling.   Patient has bilateral lower extremity edema    LINES/CATHETERS   Patient has right IJ CVC  Whitaker catheter in place    LABORATORY DATA:  CBC with Differential:    Lab Results   Component Value Date/Time    WBC 7.8 02/12/2023 04:16 AM    RBC 4.03 02/12/2023 04:16 AM    HGB 12.7 02/12/2023 04:16 AM    HCT 39.8 02/12/2023 04:16 AM     02/12/2023 04:16 AM    MCV 98.8 02/12/2023 04:16 AM    MCH 31.5 02/12/2023 04:16 AM    MCHC 31.9 02/12/2023 04:16 AM    RDW 13.0 02/12/2023 04:16 AM    SEGSPCT 73 03/12/2014 08:10 AM    LYMPHOPCT 13.0 02/12/2023 04:16 AM    MONOPCT 7.5 02/12/2023 04:16 AM    EOSPCT 2 10/21/2010 10:25 AM    BASOPCT 0.8 02/12/2023 04:16 AM    MONOSABS 0.58 02/12/2023 04:16 AM    LYMPHSABS 1.01 02/12/2023 04:16 AM    EOSABS 0.28 02/12/2023 04:16 AM    BASOSABS 0.06 02/12/2023 04:16 AM     CMP:    Lab Results   Component Value Date/Time     02/12/2023 04:16 AM    K 4.1 02/12/2023 04:16 AM    K 4.1 02/08/2023 02:33 PM     02/12/2023 04:16 AM    CO2 30 02/12/2023 04:16 AM    BUN 11 02/12/2023 04:16 AM    CREATININE 0.8 02/12/2023 04:16 AM    GFRAA >60 07/31/2019 09:44 AM    LABGLOM >60 02/12/2023 04:16 AM    GLUCOSE 149 02/12/2023 04:16 AM    GLUCOSE 99 05/30/2012 08:37 AM    PROT 6.1 02/12/2023 04:16 AM    LABALBU 3.1 02/12/2023 04:16 AM    LABALBU 4.2 05/30/2012 08:37 AM    CALCIUM 8.3 02/12/2023 04:16 AM    BILITOT 0.5 02/12/2023 04:16 AM    ALKPHOS 80 02/12/2023 04:16 AM    AST 11 02/12/2023 04:16 AM    ALT 11 02/12/2023 04:16 AM       ASSESSMENT/PLAN:  Acute hypoxemic and hypercapnic respiratory failure requiring mechanical ventilation  Exacerbation of COPD with current tobacco abuse  Encephalopathy rule out meningitis  Community-acquired pneumonia  History of multiple sclerosis since age 31  Hypertension  Hyperlipidemia  Obesity hypoventilation syndrome  History of alcohol and drug abuse  Morbid obesity with BMI 56.60 kg/m²  Hypothyroidism      Patient presented with altered mental status.  Patient became increasingly more confused and agitated.  Patient was intubated in the ED.  There is concern for meningitis.  However lumbar puncture was unable to be performed.  Patient placed on acyclovir, meropenem, vancomycin.  Patient is immunocompromise in the setting of Tecf[employer] for his MS.  Cultures ordered.  Consider ID consultation.  IR has been consulted for lumbar puncture.  Critical care consulted and patient accepted to ICU.  Further evaluation and management per critical care.  Patient is awaiting transfer to [facility] pending bed availability.    -Home medication reviewed  -N.p.o. with patient intubated  -Lactated Ringer's at 75 cc an hour  -DuoNeb aerosols 4 times daily  -Pulmicort aerosol twice daily  -NG tube feedings    -Patient extubated 2/11 PM    -BMP, CBC in a.mFrances Patel DO  11:08 AM  2/12/2023

## 2023-02-12 NOTE — PROGRESS NOTES
Pulmonary/Critical Care Progress Note    We are following patient for acute hypoxemic hypercapnic respiratory failure requiring mechanical ventilation, now extubated, COPD with exacerbation, community-acquired pneumonia, obesity hypoventilation, morbid obesity, nicotine addiction, history of multiple sclerosis, history of alcohol and drug abuse, history of hypothyroidism      SUBJECTIVE:  The patient is awake and alert and on a nasal cannula. He spent the night on BiPAP and did fairly well with this. His swallowing evaluation reveals that he can take some thick liquids but still cannot take a regular diet. If the patient cannot tolerate putting per the speech evaluation, we will give him this. We will check a chest x-ray and arterial blood gases tomorrow. In the meantime, he will continue IV vancomycin, meropenem, and small doses of dexmedetomidine if needed. I spoke to his daughter today who says that he rarely ambulates at home. He does not even use a bed but sleeps in a chair, indicative of muscle weakness and sleep apnea. He will need PT and OT daily. We just obtained a new chest x-ray which looks as though there is an infiltrate in the right midlung field, consistent with pneumonia.     MEDICATIONS:   nicotine  1 patch TransDERmal Daily    vancomycin  1,250 mg IntraVENous Q8H    meropenem  1,000 mg IntraVENous Q8H    [Held by provider] baclofen  10 mg Oral TID    [Held by provider] gabapentin  300 mg Oral BID    sodium chloride flush  5-40 mL IntraVENous 2 times per day    levothyroxine  175 mcg Oral Daily    metoprolol tartrate  25 mg Oral BID    [Held by provider] pramipexole  0.125 mg Oral TID    enoxaparin  60 mg SubCUTAneous BID    ipratropium-albuterol  1 ampule Inhalation Q4H    sodium chloride flush  5-40 mL IntraVENous 2 times per day    pantoprazole (PROTONIX) 40 mg injection  40 mg IntraVENous Daily    budesonide  0.5 mg Nebulization BID      dexmedetomidine HCl in NaCl 0.6 mcg/kg/hr (02/12/23 0946)    dextrose      sodium chloride       fentanNYL, glucose, dextrose bolus **OR** dextrose bolus, glucagon (rDNA), dextrose, sodium chloride flush, polyethylene glycol, acetaminophen **OR** acetaminophen, sodium chloride flush, sodium chloride      REVIEW OF SYSTEMS:  Constitutional: Denies fever, weight loss, night sweats, and fatigue  Skin: Denies pigmentation, dark lesions, and rashes   HEENT: Denies hearing loss, tinnitus, ear drainage, epistaxis, sore throat, and hoarseness. Cardiovascular: Denies palpitations, chest pain, and chest pressure. Respiratory: Denies cough, dyspnea at rest, hemoptysis, apnea, and choking. Gastrointestinal: Denies nausea, vomiting, poor appetite, diarrhea, heartburn or reflux  Genitourinary: Denies dysuria, frequency, urgency or hematuria  Musculoskeletal: Denies myalgias, muscle weakness, and bone pain  Neurological: Denies dizziness, vertigo, headache, and focal weakness  Psychological: Denies anxiety and depression  Endocrine: Denies heat intolerance and cold intolerance  Hematopoietic/Lymphatic: Denies bleeding problems and blood transfusions    OBJECTIVE:  Vitals:    02/12/23 1100   BP: (!) 147/87   Pulse: 76   Resp: 20   Temp:    SpO2: 93%     FiO2 : 50 %  O2 Flow Rate (L/min): 6 L/min  O2 Device: PAP (positive airway pressure)    PHYSICAL EXAM:  Constitutional: Low-grade fever this is 99.9 Fahrenheit, no chills, no diaphoresis. O2 saturation on 8 L is 86 to 92%. We will restart his vancomycin and add levofloxacin  Skin: No skin rash, no skin breakdown  HEENT: Mucous membranes are moist  Neck: Large neck circumference  Cardiovascular: S1, S2 regular no S3 or rubs present  Respiratory: Few crackles bilaterally  Gastrointestinal: Soft, obese, nontender  Genitourinary: No grossly bloody urine  Extremities: No clubbing, cyanosis. Peripheral edema is decreased  Neurological: Awake, alert, diffuse muscle weakness consistent with MS.   He is oriented x3  Psychological: Unable to examine adequately    LABS:  WBC   Date Value Ref Range Status   02/12/2023 7.8 4.5 - 11.5 E9/L Final   02/11/2023 7.9 4.5 - 11.5 E9/L Final   02/10/2023 9.8 4.5 - 11.5 E9/L Final     Hemoglobin   Date Value Ref Range Status   02/12/2023 12.7 12.5 - 16.5 g/dL Final   02/11/2023 12.6 12.5 - 16.5 g/dL Final   02/10/2023 12.3 (L) 12.5 - 16.5 g/dL Final     Hematocrit   Date Value Ref Range Status   02/12/2023 39.8 37.0 - 54.0 % Final   02/11/2023 40.5 37.0 - 54.0 % Final   02/10/2023 39.8 37.0 - 54.0 % Final     MCV   Date Value Ref Range Status   02/12/2023 98.8 80.0 - 99.9 fL Final   02/11/2023 97.6 80.0 - 99.9 fL Final   02/10/2023 98.8 80.0 - 99.9 fL Final     Platelets   Date Value Ref Range Status   02/12/2023 239 130 - 450 E9/L Final   02/11/2023 276 130 - 450 E9/L Final   02/10/2023 283 130 - 450 E9/L Final     Sodium   Date Value Ref Range Status   02/12/2023 145 132 - 146 mmol/L Final   02/11/2023 143 132 - 146 mmol/L Final   02/10/2023 139 132 - 146 mmol/L Final     Potassium   Date Value Ref Range Status   02/12/2023 4.1 3.5 - 5.0 mmol/L Final   02/11/2023 4.0 3.5 - 5.0 mmol/L Final   02/10/2023 3.6 3.5 - 5.0 mmol/L Final     Potassium reflex Magnesium   Date Value Ref Range Status   02/08/2023 4.1 3.5 - 5.0 mmol/L Final   12/10/2018 3.6 3.5 - 5.0 mmol/L Final     Chloride   Date Value Ref Range Status   02/12/2023 109 (H) 98 - 107 mmol/L Final   02/11/2023 107 98 - 107 mmol/L Final   02/10/2023 102 98 - 107 mmol/L Final     CO2   Date Value Ref Range Status   02/12/2023 30 (H) 22 - 29 mmol/L Final   02/11/2023 28 22 - 29 mmol/L Final   02/10/2023 29 22 - 29 mmol/L Final     BUN   Date Value Ref Range Status   02/12/2023 11 6 - 20 mg/dL Final   02/11/2023 10 6 - 20 mg/dL Final   02/10/2023 15 6 - 20 mg/dL Final     Creatinine   Date Value Ref Range Status   02/12/2023 0.8 0.7 - 1.2 mg/dL Final   02/11/2023 0.9 0.7 - 1.2 mg/dL Final   02/10/2023 1.1 0.7 - 1.2 mg/dL Final Glucose   Date Value Ref Range Status   02/12/2023 149 (H) 74 - 99 mg/dL Final   02/11/2023 178 (H) 74 - 99 mg/dL Final   02/10/2023 161 (H) 74 - 99 mg/dL Final   05/30/2012 99 70 - 110 mg/dL Final   04/04/2012 89 70 - 110 mg/dL Final   01/13/2012 87 70 - 110 mg/dL Final     Calcium   Date Value Ref Range Status   02/12/2023 8.3 (L) 8.6 - 10.2 mg/dL Final   02/11/2023 8.5 (L) 8.6 - 10.2 mg/dL Final   02/10/2023 8.1 (L) 8.6 - 10.2 mg/dL Final     Total Protein   Date Value Ref Range Status   02/12/2023 6.1 (L) 6.4 - 8.3 g/dL Final   02/11/2023 6.0 (L) 6.4 - 8.3 g/dL Final   02/10/2023 5.9 (L) 6.4 - 8.3 g/dL Final     Albumin   Date Value Ref Range Status   02/12/2023 3.1 (L) 3.5 - 5.2 g/dL Final   02/11/2023 3.3 (L) 3.5 - 5.2 g/dL Final   02/10/2023 3.3 (L) 3.5 - 5.2 g/dL Final   05/30/2012 4.2 3.2 - 4.8 g/dL Final   04/04/2012 4.4 3.2 - 4.8 g/dL Final   12/08/2011 4.1 3.2 - 4.8 g/dL Final     Total Bilirubin   Date Value Ref Range Status   02/12/2023 0.5 0.0 - 1.2 mg/dL Final   02/11/2023 0.2 0.0 - 1.2 mg/dL Final   02/10/2023 0.2 0.0 - 1.2 mg/dL Final     Alkaline Phosphatase   Date Value Ref Range Status   02/12/2023 80 40 - 129 U/L Final   02/11/2023 82 40 - 129 U/L Final   02/10/2023 82 40 - 129 U/L Final     AST   Date Value Ref Range Status   02/12/2023 11 0 - 39 U/L Final   02/11/2023 16 0 - 39 U/L Final   02/10/2023 7 0 - 39 U/L Final     ALT   Date Value Ref Range Status   02/12/2023 11 0 - 40 U/L Final   02/11/2023 13 0 - 40 U/L Final   02/10/2023 8 0 - 40 U/L Final     Est, Glom Filt Rate   Date Value Ref Range Status   02/12/2023 >60 >=60 mL/min/1.73 Final     Comment:     Pediatric calculator link  [email or web address].at. [email or web address]/gfr_calculatorped  Effective Oct 3, 2022  These results are not intended for use in patients  <25years of age. eGFR results are calculated without  a race factor using the 2021 CKD-EPI equation.   Careful  clinical correlation is recommended, particularly when  comparing to results calculated using previous equations. The CKD-EPI equation is less accurate in patients with  extremes of muscle mass, extra-renal metabolism of  creatinine, excessive creatinine ingestion, or following  therapy that affects renal tubular secretion. 02/11/2023 >60 >=60 mL/min/1.73 Final     Comment:     Pediatric calculator link  [employer].at. [email or web address]_[email or web address]  Effective Oct 3, 2022  These results are not intended for use in patients  <25years of age. eGFR results are calculated without  a race factor using the 2021 CKD-EPI equation. Careful  clinical correlation is recommended, particularly when  comparing to results calculated using previous equations. The CKD-EPI equation is less accurate in patients with  extremes of muscle mass, extra-renal metabolism of  creatinine, excessive creatinine ingestion, or following  therapy that affects renal tubular secretion. 02/10/2023 >60 >=60 mL/min/1.73 Final     Comment:     Pediatric calculator link  [employer].at. [email or web address]_[email or web address]  Effective Oct 3, 2022  These results are not intended for use in patients  <25years of age. eGFR results are calculated without  a race factor using the 2021 CKD-EPI equation. Careful  clinical correlation is recommended, particularly when  comparing to results calculated using previous equations. The CKD-EPI equation is less accurate in patients with  extremes of muscle mass, extra-renal metabolism of  creatinine, excessive creatinine ingestion, or following  therapy that affects renal tubular secretion.        GFR    Date Value Ref Range Status   07/31/2019 >60  Final   04/04/2019 >60  Final   02/07/2019 >60  Final     Magnesium   Date Value Ref Range Status   02/12/2023 2.2 1.6 - 2.6 mg/dL Final   02/11/2023 2.4 1.6 - 2.6 mg/dL Final   02/10/2023 2.0 1.6 - 2.6 mg/dL Final     Phosphorus   Date Value Ref Range Status 02/12/2023 2.8 2.5 - 4.5 mg/dL Final   02/11/2023 2.7 2.5 - 4.5 mg/dL Final   02/10/2023 2.8 2.5 - 4.5 mg/dL Final     Recent Labs     02/12/23  0449   PH 7.425   PO2 102.7*   PCO2 44.6   HCO3 28.6*   BE 3.6*   O2SAT 97.4       RADIOLOGY:  XR CHEST PORTABLE   Final Result   Unchanged atelectasis and or infiltrate suggested at the right base. Cardiomegaly. US DUP LOWER EXTREMITIES BILATERAL VENOUS   Final Result   No evidence of DVT in either lower extremity. XR CHEST PORTABLE   Final Result   Suboptimal inspiration with likely atelectasis at the right base. Indeterminate position of the nasogastric tube tip. Consider an abdominal   radiograph for this purpose. CT ABDOMEN PELVIS W IV CONTRAST Additional Contrast? None   Final Result   Grossly normal CT scan abdomen and pelvis. CT CHEST W CONTRAST   Final Result   Right lung atelectasis, of otherwise unremarkable CT scan chest.         XR ABDOMEN FOR NG/OG/NE TUBE PLACEMENT   Final Result   Nasogastric tube is poorly discerned on the abdominal films but CT chest   confirms that tip of this device terminates at the gastroesophageal junction. Preliminary report is provided via ancillary staff to a license caregiver on   02/08/2023 at 9:14 p.m. EST. XR CHEST PORTABLE   Final Result   1. Medical support devices as above. The enteric tube on this exam appears   to slightly retracted with distal tip projecting over the central mediastinum. (Suggest confirmation with dedicated radiographic evaluation of the lower   chest and upper abdomen and if confirmed, suggest repositioning.)      2. Atherosclerotic disease and prominence of the cardiac silhouette. There   are bilateral interstitial opacities which are unchanged. The findings were sent to the Radiology Results [address] at 6:55   pm on 2/8/2023 to be communicated to a licensed caregiver.          XR CHEST PORTABLE   Final Result   ETT as noted above.         CT HEAD WO CONTRAST   Final Result   No acute intracranial abnormality. XR CHEST PORTABLE   Final Result   No acute process. Mild cardiomegaly. XR CHEST PORTABLE    (Results Pending)           PROBLEM LIST:  Principal Problem:    Acute encephalopathy  Resolved Problems:    * No resolved hospital problems. *      IMPRESSION:  Acute hypoxemic and hypercapnic respiratory failure  Morbid obesity  Multiple sclerosis  Nicotine addiction  COPD likely with exacerbation  Suspect patient has underlying pneumonia (see above chest x-ray description)    PLAN:  Restart vancomycin  Add levofloxacin  Blood cultures x2  Return to BiPAP  Try Airvo later  IV fluids  Chest x-ray now  Check urine for C&S    ATTESTATION:  ICU Staff Physician note of personal involvement in Care  As the attending physician, I certify that I personally reviewed the patients history and personally examined the patient to confirm the physical findings described above,  And that I reviewed the relevant imaging studies and available reports. I also discussed the differential diagnosis and all of the proposed management plans with the patient and individuals accompanying the patient to this visit. They had the opportunity to ask questions about the proposed management plans and to have those questions answered. This patient has a high probability of sudden, clinically significant deterioration, which requires the highest level of physician preparedness to intervene urgently. I managed/supervised life or organ supporting interventions that required frequent physician assessment. I devoted my full attention to the direct care of this patient for the amount of time indicated below.   Time I spent with the family or surrogate(s) is included only if the patient was incapable of providing the necessary information or participating in medical decisions - Time devoted to teaching and to any procedures I billed separately is not included.     CRITICAL CARE TIME:  39 minutes    Electronically signed by Annabell Claude, MD on 2/12/2023 at 12:12 PM

## 2023-02-13 ENCOUNTER — APPOINTMENT (OUTPATIENT)
Dept: GENERAL RADIOLOGY | Age: 58
DRG: 208 | End: 2023-02-13
Payer: MEDICARE

## 2023-02-13 LAB
AADO2: 205.8 MMHG
ALBUMIN SERPL-MCNC: 3.2 G/DL (ref 3.5–5.2)
ALP BLD-CCNC: 76 U/L (ref 40–129)
ALT SERPL-CCNC: 11 U/L (ref 0–40)
ANION GAP SERPL CALCULATED.3IONS-SCNC: 8 MMOL/L (ref 7–16)
AST SERPL-CCNC: 14 U/L (ref 0–39)
B.E.: 5.1 MMOL/L (ref -3–3)
BASOPHILS ABSOLUTE: 0.06 E9/L (ref 0–0.2)
BASOPHILS RELATIVE PERCENT: 0.8 % (ref 0–2)
BILIRUB SERPL-MCNC: 0.6 MG/DL (ref 0–1.2)
BLOOD CULTURE, ROUTINE: NORMAL
BUN BLDV-MCNC: 17 MG/DL (ref 6–20)
CALCIUM SERPL-MCNC: 8.7 MG/DL (ref 8.6–10.2)
CHLORIDE BLD-SCNC: 105 MMOL/L (ref 98–107)
CO2: 29 MMOL/L (ref 22–29)
COHB: 0.2 % (ref 0–1.5)
CREAT SERPL-MCNC: 0.8 MG/DL (ref 0.7–1.2)
CRITICAL: ABNORMAL
CULTURE, BLOOD 2: NORMAL
DATE ANALYZED: ABNORMAL
DATE OF COLLECTION: ABNORMAL
EOSINOPHILS ABSOLUTE: 0.34 E9/L (ref 0.05–0.5)
EOSINOPHILS RELATIVE PERCENT: 4.6 % (ref 0–6)
FIO2: 50 %
GFR SERPL CREATININE-BSD FRML MDRD: >60 ML/MIN/1.73
GLUCOSE BLD-MCNC: 135 MG/DL (ref 74–99)
HCO3: 30.2 MMOL/L (ref 22–26)
HCT VFR BLD CALC: 40 % (ref 37–54)
HEMOGLOBIN: 12.7 G/DL (ref 12.5–16.5)
HHB: 4 % (ref 0–5)
IMMATURE GRANULOCYTES #: 0.02 E9/L
IMMATURE GRANULOCYTES %: 0.3 % (ref 0–5)
LAB: ABNORMAL
LYMPHOCYTES ABSOLUTE: 0.69 E9/L (ref 1.5–4)
LYMPHOCYTES RELATIVE PERCENT: 9.4 % (ref 20–42)
Lab: ABNORMAL
MAGNESIUM: 1.9 MG/DL (ref 1.6–2.6)
MCH RBC QN AUTO: 30.5 PG (ref 26–35)
MCHC RBC AUTO-ENTMCNC: 31.8 % (ref 32–34.5)
MCV RBC AUTO: 96.2 FL (ref 80–99.9)
METHB: 0.5 % (ref 0–1.5)
MODE: ABNORMAL
MONOCYTES ABSOLUTE: 0.59 E9/L (ref 0.1–0.95)
MONOCYTES RELATIVE PERCENT: 8 % (ref 2–12)
NEUTROPHILS ABSOLUTE: 5.64 E9/L (ref 1.8–7.3)
NEUTROPHILS RELATIVE PERCENT: 76.9 % (ref 43–80)
O2 CONTENT: 18.4 ML/DL
O2 SATURATION: 96 % (ref 92–98.5)
O2HB: 95.3 % (ref 94–97)
OPERATOR ID: ABNORMAL
PATIENT TEMP: 37 C
PCO2: 46.2 MMHG (ref 35–45)
PDW BLD-RTO: 12.6 FL (ref 11.5–15)
PFO2: 1.72 MMHG/%
PH BLOOD GAS: 7.43 (ref 7.35–7.45)
PHOSPHORUS: 2.8 MG/DL (ref 2.5–4.5)
PLATELET # BLD: 251 E9/L (ref 130–450)
PMV BLD AUTO: 10.2 FL (ref 7–12)
PO2: 86.2 MMHG (ref 75–100)
POTASSIUM SERPL-SCNC: 4.1 MMOL/L (ref 3.5–5)
RBC # BLD: 4.16 E12/L (ref 3.8–5.8)
RI(T): 2.39
SODIUM BLD-SCNC: 142 MMOL/L (ref 132–146)
SOURCE, BLOOD GAS: ABNORMAL
THB: 13.7 G/DL (ref 11.5–16.5)
TIME ANALYZED: 432
TOTAL PROTEIN: 6.2 G/DL (ref 6.4–8.3)
WBC # BLD: 7.3 E9/L (ref 4.5–11.5)

## 2023-02-13 PROCEDURE — C9113 INJ PANTOPRAZOLE SODIUM, VIA: HCPCS

## 2023-02-13 PROCEDURE — 6360000002 HC RX W HCPCS

## 2023-02-13 PROCEDURE — 92526 ORAL FUNCTION THERAPY: CPT | Performed by: SPEECH-LANGUAGE PATHOLOGIST

## 2023-02-13 PROCEDURE — 2580000003 HC RX 258: Performed by: STUDENT IN AN ORGANIZED HEALTH CARE EDUCATION/TRAINING PROGRAM

## 2023-02-13 PROCEDURE — 6370000000 HC RX 637 (ALT 250 FOR IP): Performed by: INTERNAL MEDICINE

## 2023-02-13 PROCEDURE — 94640 AIRWAY INHALATION TREATMENT: CPT

## 2023-02-13 PROCEDURE — 2580000003 HC RX 258

## 2023-02-13 PROCEDURE — 6360000002 HC RX W HCPCS: Performed by: INTERNAL MEDICINE

## 2023-02-13 PROCEDURE — 82805 BLOOD GASES W/O2 SATURATION: CPT

## 2023-02-13 PROCEDURE — 2500000003 HC RX 250 WO HCPCS: Performed by: INTERNAL MEDICINE

## 2023-02-13 PROCEDURE — 84100 ASSAY OF PHOSPHORUS: CPT

## 2023-02-13 PROCEDURE — 80053 COMPREHEN METABOLIC PANEL: CPT

## 2023-02-13 PROCEDURE — 83735 ASSAY OF MAGNESIUM: CPT

## 2023-02-13 PROCEDURE — 2580000003 HC RX 258: Performed by: INTERNAL MEDICINE

## 2023-02-13 PROCEDURE — 2700000000 HC OXYGEN THERAPY PER DAY

## 2023-02-13 PROCEDURE — 97161 PT EVAL LOW COMPLEX 20 MIN: CPT | Performed by: PHYSICAL THERAPIST

## 2023-02-13 PROCEDURE — 97112 NEUROMUSCULAR REEDUCATION: CPT | Performed by: PHYSICAL THERAPIST

## 2023-02-13 PROCEDURE — 94660 CPAP INITIATION&MGMT: CPT

## 2023-02-13 PROCEDURE — 71045 X-RAY EXAM CHEST 1 VIEW: CPT

## 2023-02-13 PROCEDURE — 36600 WITHDRAWAL OF ARTERIAL BLOOD: CPT

## 2023-02-13 PROCEDURE — 85025 COMPLETE CBC W/AUTO DIFF WBC: CPT

## 2023-02-13 PROCEDURE — 2000000000 HC ICU R&B

## 2023-02-13 PROCEDURE — 36592 COLLECT BLOOD FROM PICC: CPT

## 2023-02-13 RX ORDER — GABAPENTIN 300 MG/1
600 CAPSULE ORAL 2 TIMES DAILY
Status: DISCONTINUED | OUTPATIENT
Start: 2023-02-13 | End: 2023-02-21 | Stop reason: HOSPADM

## 2023-02-13 RX ORDER — HYDROCODONE BITARTRATE AND ACETAMINOPHEN 10; 325 MG/1; MG/1
1 TABLET ORAL EVERY 6 HOURS PRN
Status: DISCONTINUED | OUTPATIENT
Start: 2023-02-13 | End: 2023-02-21 | Stop reason: HOSPADM

## 2023-02-13 RX ADMIN — Medication 0.8 MCG/KG/HR: at 04:25

## 2023-02-13 RX ADMIN — GABAPENTIN 600 MG: 300 CAPSULE ORAL at 12:55

## 2023-02-13 RX ADMIN — Medication 10 ML: at 20:56

## 2023-02-13 RX ADMIN — SODIUM CHLORIDE, PRESERVATIVE FREE 40 MG: 5 INJECTION INTRAVENOUS at 08:20

## 2023-02-13 RX ADMIN — LEVOTHYROXINE SODIUM 175 MCG: 0.17 TABLET ORAL at 07:21

## 2023-02-13 RX ADMIN — MEROPENEM 1000 MG: 1 INJECTION, POWDER, FOR SOLUTION INTRAVENOUS at 18:03

## 2023-02-13 RX ADMIN — MEROPENEM 1000 MG: 1 INJECTION, POWDER, FOR SOLUTION INTRAVENOUS at 03:24

## 2023-02-13 RX ADMIN — VANCOMYCIN HYDROCHLORIDE 1250 MG: 10 INJECTION, POWDER, LYOPHILIZED, FOR SOLUTION INTRAVENOUS at 16:11

## 2023-02-13 RX ADMIN — MEROPENEM 1000 MG: 1 INJECTION, POWDER, FOR SOLUTION INTRAVENOUS at 11:21

## 2023-02-13 RX ADMIN — Medication 0.8 MCG/KG/HR: at 01:33

## 2023-02-13 RX ADMIN — IPRATROPIUM BROMIDE AND ALBUTEROL SULFATE 1 AMPULE: .5; 2.5 SOLUTION RESPIRATORY (INHALATION) at 13:26

## 2023-02-13 RX ADMIN — IPRATROPIUM BROMIDE AND ALBUTEROL SULFATE 1 AMPULE: .5; 2.5 SOLUTION RESPIRATORY (INHALATION) at 22:13

## 2023-02-13 RX ADMIN — BACLOFEN 10 MG: 10 TABLET ORAL at 20:55

## 2023-02-13 RX ADMIN — Medication 0.7 MCG/KG/HR: at 16:08

## 2023-02-13 RX ADMIN — Medication 10 ML: at 09:42

## 2023-02-13 RX ADMIN — Medication 0.7 MCG/KG/HR: at 19:25

## 2023-02-13 RX ADMIN — VANCOMYCIN HYDROCHLORIDE 1250 MG: 10 INJECTION, POWDER, LYOPHILIZED, FOR SOLUTION INTRAVENOUS at 00:05

## 2023-02-13 RX ADMIN — BUDESONIDE 500 MCG: 0.5 SUSPENSION RESPIRATORY (INHALATION) at 18:21

## 2023-02-13 RX ADMIN — METOPROLOL TARTRATE 25 MG: 25 TABLET, FILM COATED ORAL at 08:20

## 2023-02-13 RX ADMIN — GABAPENTIN 600 MG: 300 CAPSULE ORAL at 20:55

## 2023-02-13 RX ADMIN — Medication 0.5 MCG/KG/HR: at 08:16

## 2023-02-13 RX ADMIN — PRAMIPEXOLE DIHYDROCHLORIDE 0.12 MG: 0.12 TABLET ORAL at 20:55

## 2023-02-13 RX ADMIN — FENTANYL CITRATE 50 MCG: 0.05 INJECTION, SOLUTION INTRAMUSCULAR; INTRAVENOUS at 10:46

## 2023-02-13 RX ADMIN — IPRATROPIUM BROMIDE AND ALBUTEROL SULFATE 1 AMPULE: .5; 2.5 SOLUTION RESPIRATORY (INHALATION) at 18:21

## 2023-02-13 RX ADMIN — ENOXAPARIN SODIUM 60 MG: 100 INJECTION SUBCUTANEOUS at 20:55

## 2023-02-13 RX ADMIN — METOPROLOL TARTRATE 25 MG: 25 TABLET, FILM COATED ORAL at 20:55

## 2023-02-13 RX ADMIN — IPRATROPIUM BROMIDE AND ALBUTEROL SULFATE 1 AMPULE: .5; 2.5 SOLUTION RESPIRATORY (INHALATION) at 09:49

## 2023-02-13 RX ADMIN — ENOXAPARIN SODIUM 60 MG: 100 INJECTION SUBCUTANEOUS at 08:20

## 2023-02-13 RX ADMIN — BUDESONIDE 500 MCG: 0.5 SUSPENSION RESPIRATORY (INHALATION) at 05:24

## 2023-02-13 RX ADMIN — Medication 0.7 MCG/KG/HR: at 23:02

## 2023-02-13 RX ADMIN — VANCOMYCIN HYDROCHLORIDE 1250 MG: 10 INJECTION, POWDER, LYOPHILIZED, FOR SOLUTION INTRAVENOUS at 08:18

## 2023-02-13 RX ADMIN — BACLOFEN 10 MG: 10 TABLET ORAL at 14:12

## 2023-02-13 RX ADMIN — Medication 10 ML: at 08:20

## 2023-02-13 RX ADMIN — Medication 0.7 MCG/KG/HR: at 12:57

## 2023-02-13 RX ADMIN — Medication 10 ML: at 20:55

## 2023-02-13 RX ADMIN — LEVOFLOXACIN 750 MG: 5 INJECTION, SOLUTION INTRAVENOUS at 12:57

## 2023-02-13 RX ADMIN — IPRATROPIUM BROMIDE AND ALBUTEROL SULFATE 1 AMPULE: .5; 2.5 SOLUTION RESPIRATORY (INHALATION) at 05:24

## 2023-02-13 RX ADMIN — IPRATROPIUM BROMIDE AND ALBUTEROL SULFATE 1 AMPULE: .5; 2.5 SOLUTION RESPIRATORY (INHALATION) at 01:33

## 2023-02-13 RX ADMIN — PRAMIPEXOLE DIHYDROCHLORIDE 0.12 MG: 0.12 TABLET ORAL at 14:12

## 2023-02-13 RX ADMIN — HYDROCODONE BITARTRATE AND ACETAMINOPHEN 1 TABLET: 10; 325 TABLET ORAL at 17:50

## 2023-02-13 ASSESSMENT — PAIN SCALES - GENERAL
PAINLEVEL_OUTOF10: 6
PAINLEVEL_OUTOF10: 6
PAINLEVEL_OUTOF10: 9
PAINLEVEL_OUTOF10: 0
PAINLEVEL_OUTOF10: 0

## 2023-02-13 ASSESSMENT — PAIN DESCRIPTION - LOCATION
LOCATION: BACK;LEG
LOCATION: BACK
LOCATION: BACK;LEG
LOCATION: GENERALIZED

## 2023-02-13 ASSESSMENT — PAIN DESCRIPTION - ORIENTATION
ORIENTATION: LOWER
ORIENTATION: RIGHT;LEFT;LOWER
ORIENTATION: RIGHT;LEFT;LOWER

## 2023-02-13 ASSESSMENT — PAIN DESCRIPTION - DESCRIPTORS
DESCRIPTORS: ACHING;DISCOMFORT

## 2023-02-13 ASSESSMENT — PAIN DESCRIPTION - FREQUENCY: FREQUENCY: CONTINUOUS

## 2023-02-13 ASSESSMENT — PAIN DESCRIPTION - PAIN TYPE: TYPE: CHRONIC PAIN

## 2023-02-13 NOTE — DISCHARGE INSTR - DIET

## 2023-02-13 NOTE — PROGRESS NOTES
SPEECH/LANGUAGE PATHOLOGY  CLINICAL ASSESSMENT OF SWALLOWING FUNCTION   and PLAN OF CARE    PATIENT NAME:  Macario Ramsey  (male)     MRN:  [id number]    :  1965  (65 y.o.)  STATUS:  Inpatient: Room IC08/IC08-01    TODAY'S DATE:  2023  REFERRING PROVIDER:     SLP swallowing-dysphagia evaluation and treatment  Start:  23 1530,   End:  23 153,   ONE TIME,   Standing Count:  1 Occurrences,   R         Dom Carver MD   REASON FOR REFERRAL: dysphagia    EVALUATING THERAPIST: BISMARK Reese                 RESULTS:    DYSPHAGIA DIAGNOSIS:   Clinical indicators of moderate oropharyngeal phase dysphagia       DIET RECOMMENDATIONS:  NPO with ongoing PO analysis by SLP only to determine if PO diet can be initiated         FEEDING RECOMMENDATIONS:     Assistance level:  Not applicable      Compensatory strategies recommended: No strategies are recommended at this time      Discussed recommendations with nursing and/or faxed report to referring provider: Yes    SPEECH THERAPY  PLAN OF CARE   The dysphagia POC is established based on physician order, dysphagia diagnosis and results of clinical assessment     Skilled SLP intervention for dysphagia management on acute care up to 5 x per week until goals met, pt plateaus in function and/or discharged from hospital    Conditions Requiring Skilled Therapeutic Intervention for dysphagia:    Impaired initiation of the swallow needed verbal cues  Wet vocal quality during PO intake  Throat clearing during PO intake   Coughing during PO intake      Specific dysphagia interventions to include:     ongoing evaluation of swallow function to determine when PO diet can be safely initiated    Specific instructions for next treatment:  ongoing skilled PO analysis to determine if PO diet can be initiated   Patient Treatment Goals:    Short Term Goals:  Pt will participate in ongoing evaluation of swallow function to determine when PO diet can be safely initiated    Long Term Goals:   Pt will improve oropharyngeal swallow function to ensure airway protection during PO intake to maintain adequate nutrition/hydration and decrease signs/symptoms of aspiration to less than 1 x/day.       Patient/family Goal:    To be able to eat regular foods and drink regular liquids    Plan of care discussed with Patient and Family   The Patient did not demonstrate complete understanding of the diagnosis, prognosis and plan of care     Rehabilitation Potential/Prognosis: good                    ADMITTING DIAGNOSIS: Encephalitis [G04.90]  Acute encephalopathy [G93.40]    VISIT DIAGNOSIS:   Visit Diagnoses         Codes    Encephalitis    -  Primary G04.90             PATIENT REPORT/COMPLAINT: denies difficulty swallowing  RN cleared patient for participation in assessment     yes     PRIOR LEVEL OF SWALLOW FUNCTION:    PAST HISTORY OF DYSPHAGIA?: none reported    Home diet: Regular consistency solids (IDDSI level 7) with  thin liquids (IDDSI level 0)  Current Diet Order:  Diet NPO    PROCEDURE:  Consistencies Administered During the Evaluation   Liquids: thin liquid   Solids:  pureed foods      Method of Intake:   straw, spoon  Fed by clinician      Position:   Seated, upright    CLINICAL ASSESSMENT:  Oral Stage:       Impaired oral initiation  Delayed A-P transit due to: decreased ability for initiation     Oral residuals were noted :  throughout the oral cavity  Multi-modal stimulation to initiate swallow onset      Pharyngeal Stage:    Throat clearing present after presentation of thin liquid and pureed foods  Latent wet cough was noted after presentation of thin liquid and pureed foods  Wet respirations were noted after presentation of thin liquid and pureed foods    Cognition:   Follows 1 - step directions appropriate for this assessment and Attention impaired,     Oral Peripheral Examination   Generalized oral weakness    Current Respiratory Status    4 liters O2 via nasal cannula, BiPAP     Parameters of Speech Production  Respiration:  Adequate for speech production  Quality:   Within functional limits  Intensity: Within functional limits    Volitional Swallow: present     Volitional Cough:  non productive      Pain: No pain reported. EDUCATION:   The Speech Language Pathologist (SLP) completed education regarding results of evaluation and that intervention is warranted at this time. Learner: Patient and Family  Education: Reviewed results and recommendations of this evaluation  Evaluation of Education:  Needs further instruction    This plan may be re-evaluated and revised as warranted. Evaluation Time includes thorough review of current medical information, gathering information on past medical history/social history and prior level of function, completion of standardized testing/informal observation of tasks, assessment of data and education on plan of care and goals. [x]The admitting diagnosis and active problem list, have been reviewed prior to initiation of this evaluation.         ACTIVE PROBLEM LIST:   Patient Active Problem List   Diagnosis    Leg swelling    Lymphedema of both lower extremities    Tinea pedis of both feet    Dermatophytosis    Multiple sclerosis ([facility] [address]    Acute encephalopathy         CPT code:  72957  bedside swallow julia Moise MSCCC/SLP  Speech Language Pathologist  [id number]

## 2023-02-13 NOTE — PROGRESS NOTES
Comprehensive Nutrition Assessment    Type and Reason for Visit:  Reassess    Nutrition Recommendations/Plan:   Continue current diet   Ordered Gelatein 20 BID to optimize nutrition status & monitor. Malnutrition Assessment:  Malnutrition Status: At risk for malnutrition (Comment) (02/09/23 0800)    Context:  Chronic Illness     Findings of the 6 clinical characteristics of malnutrition:  Energy Intake:  Unable to assess (vent)  Weight Loss:  Unable to assess     Body Fat Loss:  No significant body fat loss     Muscle Mass Loss:  No significant muscle mass loss    Fluid Accumulation:  No significant fluid accumulation     Strength:  Not Performed    Nutrition Assessment:    Pt adm d/t AMS w/ resp failure, COPD exacerbation/CAP, encephalopathy r/o - meningitis. Note pt extubated on 2/11. Per SLP pt on dysphagia diet. Hx poly substance abuse, MS. Note 2/9 transfer to [facility] cancelled. Will order Gelatein 20 BID to optimize nutrition status &  monitor. Nutrition Related Findings:    A/O x4, obeses/round/distended abd, hypoactive BS, I/O +6.9L +1 non-pitting edema. Wound Type: None       Current Nutrition Intake & Therapies:    Average Meal Intake: Unable to assess  Average Supplements Intake: Unable to assess  ADULT DIET; Dysphagia - Soft and Bite Sized  ADULT ORAL NUTRITION SUPPLEMENT; Lunch, Dinner; Other Oral Supplement; Gelatein 20    Anthropometric Measures:  Height: 5' 10\" (177.8 cm)  Ideal Body Weight (IBW): 166 lbs (75 kg)    Admission Body Weight: 179 lb (81.2 kg)  Current Body Weight: 420 lb (190.5 kg) (2/13 bed), 253 % IBW.     Current BMI (kg/m2): 60.3  Usual Body Weight:  (lack measured wt hx per EMR)     Weight Adjustment For: No Adjustment     BMI Categories: Obese Class 3 (BMI 40.0 or greater)    Estimated Daily Nutrient Needs:  Energy Requirements Based On: Formula  Weight Used for Energy Requirements: Admission  Energy (kcal/day): 8994-0983  (80%)  Weight Used for Protein Requirements: Ideal  Protein (g/day): 150-1700  Method Used for Fluid Requirements: 1 ml/kcal  Fluid (ml/day): 8316-2823    Nutrition Diagnosis:   Inadequate oral intake related to impaired respiratory function as evidenced by intake 0-25%    Nutrition Interventions:   Food and/or Nutrient Delivery: Continue Current Diet, Start Oral Nutrition Supplement  Nutrition Education/Counseling: No recommendation at this time  Coordination of Nutrition Care: Continue to monitor while inpatient     Goals:     Goals: PO intake 50% or greater  Specify Other Goals: Nutrition progression    Nutrition Monitoring and Evaluation:   Behavioral-Environmental Outcomes: None Identified  Food/Nutrient Intake Outcomes: Food and Nutrient Intake, Supplement Intake, Diet Advancement/Tolerance (when medically feasible)  Physical Signs/Symptoms Outcomes: Biochemical Data, Chewing or Swallowing, Nausea or Vomiting, GI Status, Fluid Status or Edema, Weight, Skin, Nutrition Focused Physical Findings    Discharge Planning:     Too soon to determine     Que Guerrero RD  Contact: [phone]

## 2023-02-13 NOTE — PLAN OF CARE
Problem: Skin/Tissue Integrity  Goal: Absence of new skin breakdown  Description: 1. Monitor for areas of redness and/or skin breakdown  2. Assess vascular access sites hourly  3. Every 4-6 hours minimum:  Change oxygen saturation probe site  4. Every 4-6 hours:  If on nasal continuous positive airway pressure, respiratory therapy assess nares and determine need for appliance change or resting period.   2/13/2023 0254 by Bee Rolle RN  Outcome: Progressing     Problem: Respiratory - Adult  Goal: Achieves optimal ventilation and oxygenation  Outcome: Progressing     Problem: Safety - Adult  Goal: Free from fall injury  2/13/2023 0254 by Bee Rolle RN  Outcome: Progressing

## 2023-02-13 NOTE — PROGRESS NOTES
Pulmonary/Critical Care Progress Note    We are following patient for acute hypoxemic and hypercapnic respiratory failure, obstructive sleep apnea, alveolar hypoventilation syndrome, obesity (morbid), nicotine addiction, multiple sclerosis, history of alcohol and drug abuse, history of hypothyroidism, pneumonia    SUBJECTIVE:  The patient, whose respiratory muscles were getting tired yesterday, looks much better today after being started on BiPAP with relief in the form of heated high flow nasal cannula (Airvo). Chest x-ray shows possible right lower lobe infiltrate but is very difficult to tell because of the patient's body habitus. In the meantime, he is receiving levofloxacin, vancomycin, and meropenem all empirically. Cultures are being checked. His diet has been upgraded because he is now more coordinated with his swallowing. On 50%, his saturation is somewhere between 95 and 97% usually. The patient was paying attention to the aftermath of the Super Bowl on television this morning. Patient does take some chronic medications and now that his swallowing is improved, we will restart gabapentin, baclofen, Mirapex, and as needed hydrocodone-acetaminophen. He is using Precedex to help him tolerate BiPAP which is very important for his tenuous respiratory condition.     MEDICATIONS:   gabapentin  600 mg Oral BID    levofloxacin  750 mg IntraVENous Q24H    nicotine  1 patch TransDERmal Daily    vancomycin  1,250 mg IntraVENous Q8H    meropenem  1,000 mg IntraVENous Q8H    baclofen  10 mg Oral TID    sodium chloride flush  5-40 mL IntraVENous 2 times per day    levothyroxine  175 mcg Oral Daily    metoprolol tartrate  25 mg Oral BID    pramipexole  0.125 mg Oral TID    enoxaparin  60 mg SubCUTAneous BID    ipratropium-albuterol  1 ampule Inhalation Q4H    sodium chloride flush  5-40 mL IntraVENous 2 times per day    pantoprazole (PROTONIX) 40 mg injection  40 mg IntraVENous Daily    budesonide  0.5 mg Nebulization BID      dexmedetomidine HCl in NaCl 0.7 mcg/kg/hr (02/13/23 1257)    dextrose      sodium chloride       HYDROcodone-acetaminophen, fentanNYL, glucose, dextrose bolus **OR** dextrose bolus, glucagon (rDNA), dextrose, sodium chloride flush, polyethylene glycol, acetaminophen **OR** acetaminophen, sodium chloride flush, sodium chloride      REVIEW OF SYSTEMS:  Constitutional: Denies fever, weight loss, night sweats, and fatigue  Skin: Denies pigmentation, dark lesions, and rashes   HEENT: Denies hearing loss, tinnitus, ear drainage, epistaxis, sore throat, and hoarseness. Cardiovascular: Denies palpitations, chest pain, and chest pressure. Respiratory: Denies cough, dyspnea at rest, hemoptysis, apnea, and choking. Gastrointestinal: Denies nausea, vomiting, poor appetite, diarrhea, heartburn or reflux  Genitourinary: Denies dysuria, frequency, urgency or hematuria  Musculoskeletal: Denies myalgias, muscle weakness, and bone pain  Neurological: Denies dizziness, vertigo, headache, and focal weakness  Psychological: Denies anxiety and depression  Endocrine: Denies heat intolerance and cold intolerance  Hematopoietic/Lymphatic: Denies bleeding problems and blood transfusions    OBJECTIVE:  Vitals:    02/13/23 1400   BP: 134/87   Pulse: 70   Resp: 26   Temp:    SpO2: 97%     FiO2 : 50 %  O2 Flow Rate (L/min): 50 L/min  O2 Device: PAP (positive airway pressure)    PHYSICAL EXAM:  Constitutional: No fever, chills, diaphoresis  Skin: No skin rash, no skin breakdown  HEENT: Mucous membranes are moist  Neck: largeLarge neck circumference pNeck circumference, no lymphadenopathy or thyromegaly  Cardiovascular: S1, S2 regular. No S3 murmurs or rubs present p  Respiratory: Few crackles at both lung bases.   I p he is starting to have some wheezes on both sides probably secondary to his secretions from cigarette smoking  Gastrointestinal: Soft, obese, nontender  Genitourinary: No CVA tenderness  Extremities: No clubbing, cyanosis, or edema  Neurological: More awake and alert. Waving to me from the bed.   Speech is more defined as well with less slurring of words  Psychological: Seems brighter and more optimistic    LABS:  WBC   Date Value Ref Range Status   02/13/2023 7.3 4.5 - 11.5 E9/L Final   02/12/2023 7.8 4.5 - 11.5 E9/L Final   02/11/2023 7.9 4.5 - 11.5 E9/L Final     Hemoglobin   Date Value Ref Range Status   02/13/2023 12.7 12.5 - 16.5 g/dL Final   02/12/2023 12.7 12.5 - 16.5 g/dL Final   02/11/2023 12.6 12.5 - 16.5 g/dL Final     Hematocrit   Date Value Ref Range Status   02/13/2023 40.0 37.0 - 54.0 % Final   02/12/2023 39.8 37.0 - 54.0 % Final   02/11/2023 40.5 37.0 - 54.0 % Final     MCV   Date Value Ref Range Status   02/13/2023 96.2 80.0 - 99.9 fL Final   02/12/2023 98.8 80.0 - 99.9 fL Final   02/11/2023 97.6 80.0 - 99.9 fL Final     Platelets   Date Value Ref Range Status   02/13/2023 251 130 - 450 E9/L Final   02/12/2023 239 130 - 450 E9/L Final   02/11/2023 276 130 - 450 E9/L Final     Sodium   Date Value Ref Range Status   02/13/2023 142 132 - 146 mmol/L Final   02/12/2023 145 132 - 146 mmol/L Final   02/11/2023 143 132 - 146 mmol/L Final     Potassium   Date Value Ref Range Status   02/13/2023 4.1 3.5 - 5.0 mmol/L Final   02/12/2023 4.1 3.5 - 5.0 mmol/L Final   02/11/2023 4.0 3.5 - 5.0 mmol/L Final     Potassium reflex Magnesium   Date Value Ref Range Status   02/08/2023 4.1 3.5 - 5.0 mmol/L Final   12/10/2018 3.6 3.5 - 5.0 mmol/L Final     Chloride   Date Value Ref Range Status   02/13/2023 105 98 - 107 mmol/L Final   02/12/2023 109 (H) 98 - 107 mmol/L Final   02/11/2023 107 98 - 107 mmol/L Final     CO2   Date Value Ref Range Status   02/13/2023 29 22 - 29 mmol/L Final   02/12/2023 30 (H) 22 - 29 mmol/L Final   02/11/2023 28 22 - 29 mmol/L Final     BUN   Date Value Ref Range Status   02/13/2023 17 6 - 20 mg/dL Final   02/12/2023 11 6 - 20 mg/dL Final   02/11/2023 10 6 - 20 mg/dL Final     Creatinine Date Value Ref Range Status   02/13/2023 0.8 0.7 - 1.2 mg/dL Final   02/12/2023 0.8 0.7 - 1.2 mg/dL Final   02/11/2023 0.9 0.7 - 1.2 mg/dL Final     Glucose   Date Value Ref Range Status   02/13/2023 135 (H) 74 - 99 mg/dL Final   02/12/2023 149 (H) 74 - 99 mg/dL Final   02/11/2023 178 (H) 74 - 99 mg/dL Final   05/30/2012 99 70 - 110 mg/dL Final   04/04/2012 89 70 - 110 mg/dL Final   01/13/2012 87 70 - 110 mg/dL Final     Calcium   Date Value Ref Range Status   02/13/2023 8.7 8.6 - 10.2 mg/dL Final   02/12/2023 8.3 (L) 8.6 - 10.2 mg/dL Final   02/11/2023 8.5 (L) 8.6 - 10.2 mg/dL Final     Total Protein   Date Value Ref Range Status   02/13/2023 6.2 (L) 6.4 - 8.3 g/dL Final   02/12/2023 6.1 (L) 6.4 - 8.3 g/dL Final   02/11/2023 6.0 (L) 6.4 - 8.3 g/dL Final     Albumin   Date Value Ref Range Status   02/13/2023 3.2 (L) 3.5 - 5.2 g/dL Final   02/12/2023 3.1 (L) 3.5 - 5.2 g/dL Final   02/11/2023 3.3 (L) 3.5 - 5.2 g/dL Final   05/30/2012 4.2 3.2 - 4.8 g/dL Final   04/04/2012 4.4 3.2 - 4.8 g/dL Final   12/08/2011 4.1 3.2 - 4.8 g/dL Final     Total Bilirubin   Date Value Ref Range Status   02/13/2023 0.6 0.0 - 1.2 mg/dL Final   02/12/2023 0.5 0.0 - 1.2 mg/dL Final   02/11/2023 0.2 0.0 - 1.2 mg/dL Final     Alkaline Phosphatase   Date Value Ref Range Status   02/13/2023 76 40 - 129 U/L Final   02/12/2023 80 40 - 129 U/L Final   02/11/2023 82 40 - 129 U/L Final     AST   Date Value Ref Range Status   02/13/2023 14 0 - 39 U/L Final     Comment:     Specimen is slightly Hemolyzed. Result may be artificially increased. 02/12/2023 11 0 - 39 U/L Final   02/11/2023 16 0 - 39 U/L Final     ALT   Date Value Ref Range Status   02/13/2023 11 0 - 40 U/L Final   02/12/2023 11 0 - 40 U/L Final   02/11/2023 13 0 - 40 U/L Final     Est, Glom Filt Rate   Date Value Ref Range Status   02/13/2023 >60 >=60 mL/min/1.73 Final     Comment:     Pediatric calculator link  [email or web address]/gfr_calculatorped  Effective Oct 3, 2022  These results are not intended for use in patients  <25years of age. eGFR results are calculated without  a race factor using the 2021 CKD-EPI equation. Careful  clinical correlation is recommended, particularly when  comparing to results calculated using previous equations. The CKD-EPI equation is less accurate in patients with  extremes of muscle mass, extra-renal metabolism of  creatinine, excessive creatinine ingestion, or following  therapy that affects renal tubular secretion. 02/12/2023 >60 >=60 mL/min/1.73 Final     Comment:     Pediatric calculator link  [email or web address]_calculatorped  Effective Oct 3, 2022  These results are not intended for use in patients  <25years of age. eGFR results are calculated without  a race factor using the 2021 CKD-EPI equation. Careful  clinical correlation is recommended, particularly when  comparing to results calculated using previous equations. The CKD-EPI equation is less accurate in patients with  extremes of muscle mass, extra-renal metabolism of  creatinine, excessive creatinine ingestion, or following  therapy that affects renal tubular secretion. 02/11/2023 >60 >=60 mL/min/1.73 Final     Comment:     Pediatric calculator link  [email or web address]_[email or web address]  Effective Oct 3, 2022  These results are not intended for use in patients  <25years of age. eGFR results are calculated without  a race factor using the 2021 CKD-EPI equation. Careful  clinical correlation is recommended, particularly when  comparing to results calculated using previous equations. The CKD-EPI equation is less accurate in patients with  extremes of muscle mass, extra-renal metabolism of  creatinine, excessive creatinine ingestion, or following  therapy that affects renal tubular secretion.        GFR    Date Value Ref Range Status   07/31/2019 >60  Final   04/04/2019 >60  Final   02/07/2019 >60  Final     Magnesium   Date Value Ref Range Status   02/13/2023 1.9 1.6 - 2.6 mg/dL Final   02/12/2023 2.2 1.6 - 2.6 mg/dL Final   02/11/2023 2.4 1.6 - 2.6 mg/dL Final     Phosphorus   Date Value Ref Range Status   02/13/2023 2.8 2.5 - 4.5 mg/dL Final   02/12/2023 2.8 2.5 - 4.5 mg/dL Final   02/11/2023 2.7 2.5 - 4.5 mg/dL Final     Recent Labs     02/13/23  0432   PH 7.433   PO2 86.2   PCO2 46.2*   HCO3 30.2*   BE 5.1*   O2SAT 96.0       RADIOLOGY:  XR CHEST PORTABLE   Final Result   Cardiomegaly, mild pulmonary venous hypertension and minimal perihilar hazy   opacity. XR CHEST PORTABLE   Final Result   Persistent subsegmental atelectasis in the right base with discrete increased   density in the right parahilar region. XR CHEST PORTABLE   Final Result   Unchanged atelectasis and or infiltrate suggested at the right base. Cardiomegaly. US DUP LOWER EXTREMITIES BILATERAL VENOUS   Final Result   No evidence of DVT in either lower extremity. XR CHEST PORTABLE   Final Result   Suboptimal inspiration with likely atelectasis at the right base. Indeterminate position of the nasogastric tube tip. Consider an abdominal   radiograph for this purpose. CT ABDOMEN PELVIS W IV CONTRAST Additional Contrast? None   Final Result   Grossly normal CT scan abdomen and pelvis. CT CHEST W CONTRAST   Final Result   Right lung atelectasis, of otherwise unremarkable CT scan chest.         XR ABDOMEN FOR NG/OG/NE TUBE PLACEMENT   Final Result   Nasogastric tube is poorly discerned on the abdominal films but CT chest   confirms that tip of this device terminates at the gastroesophageal junction. Preliminary report is provided via ancillary staff to a license caregiver on   02/08/2023 at 9:14 p.m. EST. XR CHEST PORTABLE   Final Result   1. Medical support devices as above.   The enteric tube on this exam appears   to slightly retracted with distal tip projecting over the central mediastinum. (Suggest confirmation with dedicated radiographic evaluation of the lower   chest and upper abdomen and if confirmed, suggest repositioning.)      2. Atherosclerotic disease and prominence of the cardiac silhouette. There   are bilateral interstitial opacities which are unchanged. The findings were sent to the Radiology Results [address] at 6:55   pm on 2/8/2023 to be communicated to a licensed caregiver. XR CHEST PORTABLE   Final Result   ETT as noted above. CT HEAD WO CONTRAST   Final Result   No acute intracranial abnormality. XR CHEST PORTABLE   Final Result   No acute process. Mild cardiomegaly. PROBLEM LIST:  Principal Problem:    Acute encephalopathy  Resolved Problems:    * No resolved hospital problems. *      IMPRESSION:  Acute hypoxemic and hypercapnic respiratory failure  Morbid obesity  Obstructive sleep apnea, not diagnosed officially  Multiple sclerosis  Suspect pneumonia right lower lobe  COPD with exacerbation from cigarette smoking  Nicotine addiction    PLAN:  Vancomycin and levofloxacin  BiPAP on 4 hours off 4 hours and at night  Airvo when not on BiPAP  IV fluids  Follow culture results  Appropriate anticoagulation  Labs in a.m. ATTESTATION:  ICU Staff Physician note of personal involvement in Care  As the attending physician, I certify that I personally reviewed the patients history and personally examined the patient to confirm the physical findings described above,  And that I reviewed the relevant imaging studies and available reports. I also discussed the differential diagnosis and all of the proposed management plans with the patient and individuals accompanying the patient to this visit. They had the opportunity to ask questions about the proposed management plans and to have those questions answered.      This patient has a high probability of sudden, clinically significant deterioration, which requires the highest level of physician preparedness to intervene urgently. I managed/supervised life or organ supporting interventions that required frequent physician assessment. I devoted my full attention to the direct care of this patient for the amount of time indicated below. Time I spent with the family or surrogate(s) is included only if the patient was incapable of providing the necessary information or participating in medical decisions - Time devoted to teaching and to any procedures I billed separately is not included.     CRITICAL CARE TIME:  36 minutes    Electronically signed by Alexia Short MD on 2/13/2023 at 2:49 PM

## 2023-02-13 NOTE — CARE COORDINATION
2/13/23 ICU, Airvo- bipap off/on,  Pt with Multiple Sclerosis diagnosed in his 29's. 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 Pt is on disability and has Medicare/Medicaid coverage. ASA coverage difficult for [facility], [facility]- approvals. May need SNF stay prior to discharge to home. CM following. Electronically signed by VINITA Morley on 2/13/2023 at 9:45 AM        The Plan for Transition of Care is related to the following treatment goals: DME, HHC, SNF, LTACH     The Patient and/or patient representative Stan Humphrey- his daughter was provided with a choice of provider and agrees   with the discharge plan. [x] Yes [] No    Freedom of choice list was provided with basic dialogue that supports the patient's individualized plan of care/goals, treatment preferences and shares the quality data associated with the providers. [x] Yes [] No    Unclear discharge needs.        Electronically signed by VINITA Morley on 2/13/2023 at 9:46 AM

## 2023-02-13 NOTE — PROGRESS NOTES
Physical Therapy    Physical Therapy Initial Evaluation/Plan of Care    Room #:  IC08/IC08-01  Patient Name: Calderon Tao  YOB: 1965  MRN: [id number]    Date of Service: 2/13/2023     Tentative placement recommendation: Subacute Rehab  Equipment recommendation: To be determined      Evaluating Physical Therapist: Vel Gordon, PT  #[id number]      Specific Provider Orders/Date/Referring Provider :  02/13/23 1145    PT eval and treat  Start:  02/13/23 1145,   End:  02/13/23 1145,   ONE TIME,   Standing Count:  1 Occurrences,   R         Doe Roy MD     Admitting Diagnosis:   Encephalitis [G04.90]  Acute encephalopathy [G93.40]     Admitted with    altered mental status , hx ms  right lung atelectasis  Intubated 2/8-2/11  Surgery: none  Visit Diagnoses         Codes    Encephalitis    -  Primary G04.90            Patient Active Problem List   Diagnosis    Leg swelling    Lymphedema of both lower extremities    Tinea pedis of both feet    Dermatophytosis    Multiple sclerosis (HCC)    Acute encephalopathy        ASSESSMENT of Current Deficits Patient exhibits decreased strength, balance, endurance, range of motion, and coordination impairing functional mobility, transfers, gait , gait distance, and tolerance to activity are barriers to d/c and require skilled intervention to address concerns listed above to increase safety and independence at discharge.   Decreased strength, balance and endurance  increases patient's risk for fall.   Pt with impaired gait prior to illness d/t weakness in Right lower extremity \"I drag it\"  Overall debility, recent intubation and pre morbid mobility issues impact  functional independence       PHYSICAL THERAPY  PLAN OF CARE       Physical therapy plan of care is established based on physician order,  patient diagnosis and clinical assessment    Current Treatment Recommendations:    -Bed Mobility: Lower extremity exercises , Upper extremity exercises , and Trunk  control activities   -Sitting Balance: Incorporate reaching activities to activate trunk muscles , Hands on support to maintain midline , Facilitate active trunk muscle engagement , Facilitate postural control in all planes , and Engage in core activities to allow for movement within base of support   -Standing Balance: Perform strengthening exercises in standing to promote motor control with or without upper extremity support  and Instruct patient on adequate base of support to maintain balance  -Transfers: Provide instruction on proper hand and foot position for adequate transfer of weight onto lower extremities and use of gait device if needed, Cues for hand placement, technique and safety. Provide stabilization to prevent fall , Support transfer of weight on to lower extremities, and Assist with extension of knees trunk and hip to accept weight transfer     PT long term treatment goals are located in below grid    Patient and or family understand(s) diagnosis, prognosis, and plan of care. Frequency of treatments: Patient will be seen  daily.          Prior Level of Function: Patient ambulated independently    Rehab Potential: good   for baseline    Past medical history:   Past Medical History:   Diagnosis Date    Dermatophytosis 1/12/2023    Hyperlipidemia     Hypertension     Leg pain     Leg swelling 1/12/2023    Lymphedema     Lymphedema of both lower extremities 1/12/2023    MS (multiple sclerosis) ([facility] [address]     Multiple sclerosis ([facility] [address]     Multiple sclerosis ([facility] [address] 1/12/2023    With significant weakness of both legs follows up with [facility] clinic    Spinal stenosis, lumbar     Thyroid disease     Tinea pedis of both feet 1/12/2023     Past Surgical History:   Procedure Laterality Date    HERNIA REPAIR      TONSILLECTOMY         SUBJECTIVE:    Precautions: titrate/wean oxygen and pep flutter valve, falls, alarm, and O2 , air vo    Social history: Patient lives with mother and resides  in basement with one flight of steps       Equipment owned: unknown,       AM-PAC Basic Mobility        AM-PAC Basic Mobility - Inpatient   How much help is needed turning from your back to your side while in a flat bed without using bedrails?: A Lot  How much help is needed moving from lying on your back to sitting on the side of a flat bed without using bedrails?: A Lot  How much help is needed moving to and from a bed to a chair?: Total  How much help is needed standing up from a chair using your arms?: Total  How much help is needed walking in hospital room?: Total  How much help is needed climbing 3-5 steps with a railing?: Total  AM-PAC Inpatient Mobility Raw Score : 8  AM-PAC Inpatient T-Scale Score : 28.52  Mobility Inpatient CMS 0-100% Score: 86.62  Mobility Inpatient CMS G-Code Modifier :     Nursing cleared patient for PT evaluation. The admitting diagnosis and active problem list as listed above have been reviewed prior to the initiation of this evaluation. OBJECTIVE;   Initial Evaluation  Date: 2/13/2023 Treatment Date:     Short Term/ Long Term   Goals   Was pt agreeable to Eval/treatment? Yes  To be met in 5 days   Pain level   0/10        Bed Mobility    Rolling: Moderate assist of 1    Supine to sit: Maximal assist of 1    Sit to supine: Dependent of  2    Scooting: Maximal assist of 1    Rolling: Minimal assist of 1    Supine to sit: Minimal assist of 1    Sit to supine: Minimal assist of 1    Scooting: Minimal assist of 1     Transfers Sit to stand: Not assessed     Sit to stand:  Moderate assist of 1     Ambulation    not assessed     10 feet using  wheeled walker with Moderate assist of  2    ROM impaired   Increase range of motion 10% of affected joints    Strength BUE:   3/5  RLE:  1/5  LLE:  2/5  Increase strength in affected mm groups by 1/3 grade   Balance Sitting EOB:  poor multiplane instability with poor sense of upright  Dynamic Standing:  not assessed    Sitting EOB:  fair        Patient is Alert & Oriented x person, place, and time and follows one step directions    Sensation:  Patient  denies numbness/tingling   Edema:  yes bilateral lower extremities and right hand   Endurance: poor fatigues easily    Vitals: Heated high flow 50 L/min 50%  Blood Pressure at rest  Blood Pressure during session    Heart Rate at rest  71 Heart Rate during session  80   SPO2 at rest 98%  SPO2 during session 95%     Patient education  Patient educated on role of Physical Therapy, risks of immobility, safety and plan of care, importance of positional changes for oxygen exchange,  importance of mobility while in hospital , safety , and positioning for skin integrity and comfort     Patient response to education:   Pt verbalized understanding Pt demonstrated skill Pt requires further education in this area   Yes Partial Yes      Treatment:  Patient practiced and was instructed/facilitated in the following treatment: Patient   Sat edge of bed 10 minutes with Moderate assist of 1 to increase dynamic sitting balance and activity tolerance. Once positioned with feet flat on floor, stabilization challenges for poor trunk control     Therapeutic Exercises:  ankle pumps, shoulder elevation, elbow flexion/extension, and grasp/relax  x 10 reps. At end of session, patient in bed with  call light and phone within reach,  all lines and tubes intact, nursing notified. Patient would benefit from continued skilled Physical Therapy to improve functional independence and quality of life. Patient's/ family goals   none stated    Time in  421  Time out  452    Total Treatment Time  10 minutes    Evaluation time includes thorough review of current medical information, gathering information on past medical history/social history and prior level of function, completion of standardized testing/informal observation of tasks, assessment of data, and development of Plan of care and goals.      CPT codes:  Low Complexity PT evaluation (58136)  Neuromuscular reeducation (53177)   10 minutes  1 unit(s)    Tamia Hassan, PT

## 2023-02-13 NOTE — PLAN OF CARE
Problem: Skin/Tissue Integrity  Goal: Absence of new skin breakdown  Description: 1. Monitor for areas of redness and/or skin breakdown  2. Assess vascular access sites hourly  3. Every 4-6 hours minimum:  Change oxygen saturation probe site  4. Every 4-6 hours:  If on nasal continuous positive airway pressure, respiratory therapy assess nares and determine need for appliance change or resting period.   2/13/2023 0254 by Mariangel Peters RN  Outcome: Progressing     Problem: Respiratory - Adult  Goal: Achieves optimal ventilation and oxygenation  2/13/2023 1456 by Lakesha Gardner RN  Outcome: Progressing  2/13/2023 0254 by Mariangel Peters RN  Outcome: Progressing     Problem: Genitourinary - Adult  Goal: Urinary catheter remains patent  Outcome: Progressing     Problem: Safety - Adult  Goal: Free from fall injury  2/13/2023 1456 by Lakesha Gardner RN  Outcome: Progressing  2/13/2023 0254 by Mariangel Peters RN  Outcome: Progressing     Problem: ABCDS Injury Assessment  Goal: Absence of physical injury  Outcome: Progressing

## 2023-02-13 NOTE — PROGRESS NOTES
Department of Internal Medicine  PN    PCP: Trinidad Avila DO  Admitting Physician: Dr. Marguerite Ya  Consultants:   Date of Service: 2/8/2023    CHIEF COMPLAINT: Altered mental status    HISTORY OF PRESENT ILLNESS:    Patient is 59-year-old male who presented to the ED with altered mental status. HPI obtained from staff and chart review. Patient lives in the basement apparently. Mother stated that she has a room overhead where her son stays. States that she heard her son talking nonsensically and repeating phrases. She confronted him and tried to assist him. Later in the day patient went to the bathroom however did not come out and mother became concerned and called EMS. .  On presentation patient was able to answer questions although confused. patient was intubated due to protection of airway and need for further evaluation in the setting of agitation. 2/9/2023  Patient seen examined on ICU. Patient's family members at the bedside and case discussed. Case discussed with patient's nurse at the bedside. BUN/creatinine 16/1.0 with fasting blood sugar 195. Procalcitonin was 0.04. Drug screen positive for opiates with the patient on Percocet at home. WBC 16.7 hemoglobin 13.6. Urinalysis is unremarkable. Temperature 99 with heart rate 71 blood pressure 132/87. O2 sat 97% with the patient on assist control ventilator with a rate of 20 and 8 of PEEP and FiO2 55%. Urinary output is adequate. 2/10/2023  Patient seen examined in ICU. Patient still intubated and sedated. Patient currently with NG tube feedings. BUN/creatinine 15/1.1 with liver enzymes normal.  WBC is 9.8 with hemoglobin 12.3. Viral respiratory panel was negative. Temperature is 100 degrees with heart rate of 69 blood pressure 134/83. O2 sat 98% with the patient on assist control ventilator with a rate of 20 and 8 of PEEP with FiO2 45%. Urine output about 550 cc a shift.   Chest x-ray this morning shows unchanged atelectasis and/or infiltrate in the right base. 2/11/2023  Patient seen and examined on ICU. Case discussed with patient's nurse at the bedside. BUN/creatinine was 10/0.9 with normal electrolytes. Transaminases normal with WBC with fasting blood sugar 178. CBC is normal.  Temperature 99.7 with heart rate 76 and blood pressure 151/90. O2 sat 97% with patient assist control ventilator with rate of 20 and 8 of PEEP and FiO2 40%. Urinary output is good. Case discussed with intensivist today. We will attempt weaning off ventilator today. 2/12/2023  Patient seen examined on ICU. Patient was extubated yesterday afternoon. Patient is alert oriented very very weak and still very short of breath with any activity. Case discussed with patient nurse at the bedside. BUN/creatinine 11/0.8 with normal electrolytes. Liver enzymes are normal with WBC 7.8 hemoglobin 12.7. Temperature 99.9 with heart rate 73 and blood pressure 137/86. O2 sat 95% with the patient on BiPAP with FiO2 50%. 2/13/2023  Patient seen and examined on ICU. Patient is alert and oriented to person place. The patient is very weak and has not been out of bed yet. Patient stated he tolerated eating pudding this morning without problems. BUN/creatinine 17/0.8 with normal electrolytes. Liver enzymes are normal with a WBC of 7.3 and hemoglobin 12.7. Temperature is 100.4 with heart rate of 71 blood pressure 158/90. O2 sat 95% with the patient on heated high flow nasal cannula with FiO2 55%. Urine output ranges 350-500 cc a shift. Increase activity, consult PT/OT.     PAST MEDICAL Hx:  Past Medical History:   Diagnosis Date    Dermatophytosis 1/12/2023    Hyperlipidemia     Hypertension     Leg pain     Leg swelling 1/12/2023    Lymphedema     Lymphedema of both lower extremities 1/12/2023    MS (multiple sclerosis) ([address]     Multiple sclerosis ([address]     Multiple sclerosis ([address] 1/12/2023    With significant weakness of both legs follows up with [facility] [employer] [facility]    Spinal stenosis, lumbar     Thyroid disease     Tinea pedis of both feet 1/12/2023       PAST SURGICAL Hx:   Past Surgical History:   Procedure Laterality Date    HERNIA REPAIR      TONSILLECTOMY         FAMILY Hx:  No family history on file. HOME MEDICATIONS:  Prior to Admission medications    Medication Sig Start Date End Date Taking? Authorizing Provider   levothyroxine (SYNTHROID) 175 MCG tablet Take 175 mcg by mouth every morning (before breakfast) 8/27/18  Yes Historical Provider, MD   metFORMIN (GLUCOPHAGE) 500 MG tablet Take 500 mg by mouth 2 times daily 3/4/22  Yes Historical Provider, MD   Monomethyl Fumarate (BAFIERTAM) 95 MG CPDR Take 190 mg by mouth 2 times daily 1/24/23 1/24/24 Yes Historical Provider, MD   ADVAIR -21 MCG/ACT inhaler Inhale 2 puffs into the lungs in the morning and at bedtime 2/8/23   Historical Provider, MD   gabapentin (NEURONTIN) 600 MG tablet Take 1 tablet by mouth in the morning, at noon, and at bedtime. 12/26/22   Historical Provider, MD   HYDROcodone-acetaminophen (NORCO)  MG per tablet Take 1 tablet by mouth every 6 hours as needed.  2/6/23   Historical Provider, MD   torsemide (DEMADEX) 20 MG tablet Take 1 tablet by mouth daily 12/31/22   Historical Provider, MD   valsartan (DIOVAN) 160 MG tablet Take 1 tablet by mouth daily 12/5/22   Historical Provider, MD   miconazole nitrate 2 % OINT Apply topically 2 times daily Apply to the toes in between the toes both feet and both calfs up to the knee twice a day for 1 month 1/19/23   Joshua Yu MD   spironolactone (ALDACTONE) 25 MG tablet Take 25 mg by mouth daily    Historical Provider, MD   pramipexole (MIRAPEX) 0.125 MG tablet Take 0.125 mg by mouth 3 times daily    Historical Provider, MD   baclofen (LIORESAL) 20 MG tablet Take 20 mg by mouth 3 times daily    Historical Provider, MD   metoprolol tartrate (LOPRESSOR) 25 MG tablet Take 25 mg by mouth 2 times daily    Historical Provider, MD vitamin E 1000 UNITS capsule Take 1,000 Units by mouth daily. Historical Provider, MD   loratadine (CLARITIN) 10 MG tablet Take 10 mg by mouth daily. Historical Provider, MD       ALLERGIES:  Bactrim [sulfamethoxazole-trimethoprim], Penicillins, and Sulfa antibiotics    SOCIAL Hx:  Social History     Socioeconomic History    Marital status:      Spouse name: Not on file    Number of children: Not on file    Years of education: Not on file    Highest education level: Not on file   Occupational History    Not on file   Tobacco Use    Smoking status: Every Day     Packs/day: 1.00     Types: Cigarettes    Smokeless tobacco: Never   Substance and Sexual Activity    Alcohol use: Yes     Comment: rarely    Drug use: Not Currently     Types: Marijuana Jenet Brookneal)     Comment: edible    Sexual activity: Not on file   Other Topics Concern    Not on file   Social History Narrative    ** Merged History Encounter **          Social Determinants of Health     Financial Resource Strain: Not on file   Food Insecurity: Not on file   Transportation Needs: Not on file   Physical Activity: Not on file   Stress: Not on file   Social Connections: Not on file   Intimate Partner Violence: Not on file   Housing Stability: Not on file       ROS: Positive in bold  General:   Denies chills, fatigue, fever, malaise, night sweats or weight loss    Psychological:   Denies anxiety, disorientation or hallucinations    ENT:    Denies epistaxis, headaches, vertigo or visual changes    Cardiovascular:   Denies any chest pain, irregular heartbeats, or palpitations. No paroxysmal nocturnal dyspnea. Respiratory:   Denies shortness of breath, coughing, sputum production, hemoptysis, or wheezing. No orthopnea. Gastrointestinal:   Denies nausea, vomiting, diarrhea, or constipation. Denies any abdominal pain. Denies change in bowel habits or stools. Genito-Urinary:    Denies any urgency, frequency, hematuria.   Voiding without difficulty. Musculoskeletal:   Denies joint pain, joint stiffness, joint swelling or muscle pain    Neurology:    Denies any headache or focal neurological deficits. No weakness or paresthesia. Derm:    Denies any rashes, ulcers, or excoriations. Denies bruising. Extremities:   Denies any lower extremity swelling or edema. PHYSICAL EXAM: Abnormal findings noted  VITALS:  Vitals:    02/13/23 1116   BP:    Pulse:    Resp: 14   Temp:    SpO2:          CONSTITUTIONAL:    Awake, alert, cooperative, no apparent distress, and appears stated age    Patient is intubated and sedated    EYES:    sclera clear, conjunctiva normal    ENT:    Normocephalic, atraumatic,  External ears without lesions. Patient has OG tube and ET tube in place    NECK:    Supple, symmetrical, trachea midline,no JVD    HEMATOLOGIC/LYMPHATICS:    No cervical lymphadenopathy and no supraclavicular lymphadenopathy    LUNGS:    coarse decreased breath sounds to auscultation bilaterally, no wheezes, rhonchi, or rales,     CARDIOVASCULAR:    Normal apical impulse, regular rate and rhythm, normal S1 and S2, no S3 or S4, and no murmur noted    ABDOMEN:    , soft, non-distended, non-tender, morbidly obese    MUSCULOSKELETAL:    There is no redness, warmth, or swelling of the joints. NEUROLOGIC:    Awake, alert, oriented to name, place and time. Patient currently intubated and sedated    SKIN:    No bruising or bleeding. No redness, warmth, or swelling    EXTREMITIES:    Peripheral pulses present. No edema, cyanosis, or swelling.   Patient has bilateral lower extremity edema    LINES/CATHETERS   Patient has right IJ CVC  Whitaker catheter in place    LABORATORY DATA:  CBC with Differential:    Lab Results   Component Value Date/Time    WBC 7.3 02/13/2023 04:28 AM    RBC 4.16 02/13/2023 04:28 AM    HGB 12.7 02/13/2023 04:28 AM    HCT 40.0 02/13/2023 04:28 AM     02/13/2023 04:28 AM    MCV 96.2 02/13/2023 04:28 AM    MCH 30.5 02/13/2023 04:28 AM    MCHC 31.8 02/13/2023 04:28 AM    RDW 12.6 02/13/2023 04:28 AM    SEGSPCT 73 03/12/2014 08:10 AM    LYMPHOPCT 9.4 02/13/2023 04:28 AM    MONOPCT 8.0 02/13/2023 04:28 AM    EOSPCT 2 10/21/2010 10:25 AM    BASOPCT 0.8 02/13/2023 04:28 AM    MONOSABS 0.59 02/13/2023 04:28 AM    LYMPHSABS 0.69 02/13/2023 04:28 AM    EOSABS 0.34 02/13/2023 04:28 AM    BASOSABS 0.06 02/13/2023 04:28 AM     CMP:    Lab Results   Component Value Date/Time     02/13/2023 04:28 AM    K 4.1 02/13/2023 04:28 AM    K 4.1 02/08/2023 02:33 PM     02/13/2023 04:28 AM    CO2 29 02/13/2023 04:28 AM    BUN 17 02/13/2023 04:28 AM    CREATININE 0.8 02/13/2023 04:28 AM    GFRAA >60 07/31/2019 09:44 AM    LABGLOM >60 02/13/2023 04:28 AM    GLUCOSE 135 02/13/2023 04:28 AM    GLUCOSE 99 05/30/2012 08:37 AM    PROT 6.2 02/13/2023 04:28 AM    LABALBU 3.2 02/13/2023 04:28 AM    LABALBU 4.2 05/30/2012 08:37 AM    CALCIUM 8.7 02/13/2023 04:28 AM    BILITOT 0.6 02/13/2023 04:28 AM    ALKPHOS 76 02/13/2023 04:28 AM    AST 14 02/13/2023 04:28 AM    ALT 11 02/13/2023 04:28 AM       ASSESSMENT/PLAN:  Acute hypoxemic and hypercapnic respiratory failure requiring mechanical ventilation  Exacerbation of COPD with current tobacco abuse  Encephalopathy rule out meningitis  Community-acquired pneumonia  History of multiple sclerosis since age 28  Hypertension  Hyperlipidemia  Obesity hypoventilation syndrome  History of alcohol and drug abuse  Morbid obesity with BMI 56.60 kg/m²  Hypothyroidism      Patient presented with altered mental status. Patient became increasingly more confused and agitated. Patient was intubated in the ED. There is concern for meningitis. However lumbar puncture was unable to be performed. Patient placed on acyclovir, meropenem, vancomycin. Patient is immunocompromise in the setting of [employer] for his MS. Cultures ordered. Consider ID consultation. IR has been consulted for lumbar puncture.   Critical care consulted and patient accepted to ICU. Further evaluation and management per critical care. Patient is awaiting transfer to [facility] pending bed availability.    -Home medication reviewed  -N.p.o. with patient intubated  -Lactated Ringer's at 75 cc an hour  -DuoNeb aerosols 4 times daily  -Pulmicort aerosol twice daily  -NG tube feedings    -Patient extubated 2/11 PM    -IV Merrem  -IV vancomycin  -IV Levaquin  -Lovenox 60 mg SQ twice daily  -PT/OT    -BMP, CBC in a.m.     Marry Esparza DO  11:38 AM  2/13/2023

## 2023-02-13 NOTE — PROGRESS NOTES
Pharmacy Consultation Note  (Antibiotic Dosing and Monitoring)    Initial consult date: 02/08/2023  Consulting physician/provider: Rice  Drug: Vancomycin  Indication: Central Nervous System Infection     Age/  Gender Height Weight IBW  Allergy Information   57 y.o./male 5' 10\" (177.8 cm) (!) 380 lb (172.4 kg)     Ideal body weight: 73 kg (160 lb 15 oz)  Adjusted ideal body weight: 120 kg (264 lb 9 oz)   Bactrim [sulfamethoxazole-trimethoprim], Penicillins, and Sulfa antibiotics      Renal Function:  Recent Labs     02/11/23  0532 02/12/23  0416 02/13/23  0428   BUN 10 11 17   CREATININE 0.9 0.8 0.8         Intake/Output Summary (Last 24 hours) at 2/13/2023 1118  Last data filed at 2/13/2023 1005  Gross per 24 hour   Intake 2353.24 ml   Output 1850 ml   Net 503.24 ml       Vancomycin Monitoring:  Trough:    Recent Labs     02/11/23  0757   VANCOTROUGH 14.2       Random:  No results for input(s): VANCORANDOM in the last 72 hours. No results for input(s): Lisa Courts in the last 72 hours. Historical Cultures:  No results found for: ORG  No results for input(s): BC in the last 72 hours. Recent vancomycin administrations                     vancomycin (VANCOCIN) 1,250 mg in sodium chloride 0.9 % 250 mL IVPB (mg) 1,250 mg New Bag 02/13/23 0818     1,250 mg New Bag  0005     1,250 mg New Bag 02/12/23 1814     1,250 mg New Bag  0848     1,250 mg New Bag  0141     1,250 mg New Bag 02/11/23 1710     1,250 mg New Bag  1107     1,250 mg New Bag  0000     1,250 mg New Bag 02/10/23 1700             Assessment:  Patient is a 65 y.o. male who has been initiated on vancomycin  Estimated Creatinine Clearance: 175 mL/min (based on SCr of 0.8 mg/dL). To dose vancomycin, pharmacy will be utilizing [employer] calculation software for goal AUC/BEAU 400-600 mg/L-hr  2/10: Random AM level = 8.6 mcg/mL. AUC/BEAU 290 mg/L.hr. Est true trough of 4.5 mcg/mL.   2/11: Trough @ 0757 = 14.2 mcg/mL, AUC/BEAU 459 mg/L-hr    Plan:  Continue Vancomycin 1250 mg IV q8hr  Repeat level tomorrow @ 0800.  Hold dose if level > 20 mcg/mL  Will continue to monitor renal function   Pharmacy to follow    Christy Limon PharmD, BCPS 2/13/2023 11:22 AM   Ext: [phone]

## 2023-02-13 NOTE — PROGRESS NOTES
Speech Language Pathology      NAME:  Annette Harrison  :  1965  DATE: 2023  ROOM:  [place] [id number]    Patient seen for dysphagia therapy 15 minutes. Patient much more alert moving better in bed able to keep head upright without support. Vocal quality improved and following of directions improved. Patient able to feed self. Patient tolerated soft solids, puree and thin liquids well. Education completed regarding need to decrease bite size and volume of liquids especially while on airvo as this provides increased pharyngeal pressure and may decrease his ability to sustain airway closure during the swallow.   Will continue POC    Encephalitis [G04.90]  Acute encephalopathy [G93.40]    [id number]  dysphagia tx    Scout Naik MSCCC/SLP  Speech Language Pathologist  [id number]

## 2023-02-14 ENCOUNTER — APPOINTMENT (OUTPATIENT)
Dept: GENERAL RADIOLOGY | Age: 58
DRG: 208 | End: 2023-02-14
Payer: MEDICARE

## 2023-02-14 LAB
ALBUMIN SERPL-MCNC: 3 G/DL (ref 3.5–5.2)
ALP BLD-CCNC: 79 U/L (ref 40–129)
ALT SERPL-CCNC: 12 U/L (ref 0–40)
ANION GAP SERPL CALCULATED.3IONS-SCNC: 5 MMOL/L (ref 7–16)
AST SERPL-CCNC: 10 U/L (ref 0–39)
BASOPHILS ABSOLUTE: 0.04 E9/L (ref 0–0.2)
BASOPHILS RELATIVE PERCENT: 0.6 % (ref 0–2)
BILIRUB SERPL-MCNC: 0.5 MG/DL (ref 0–1.2)
BUN BLDV-MCNC: 20 MG/DL (ref 6–20)
CALCIUM SERPL-MCNC: 8.8 MG/DL (ref 8.6–10.2)
CHLORIDE BLD-SCNC: 103 MMOL/L (ref 98–107)
CHOLESTEROL, TOTAL: 170 MG/DL (ref 0–199)
CO2: 30 MMOL/L (ref 22–29)
CREAT SERPL-MCNC: 0.8 MG/DL (ref 0.7–1.2)
EOSINOPHILS ABSOLUTE: 0.34 E9/L (ref 0.05–0.5)
EOSINOPHILS RELATIVE PERCENT: 5.1 % (ref 0–6)
GFR SERPL CREATININE-BSD FRML MDRD: >60 ML/MIN/1.73
GLUCOSE BLD-MCNC: 142 MG/DL (ref 74–99)
HCT VFR BLD CALC: 38 % (ref 37–54)
HDLC SERPL-MCNC: 22 MG/DL
HEMOGLOBIN: 12.6 G/DL (ref 12.5–16.5)
IMMATURE GRANULOCYTES #: 0.03 E9/L
IMMATURE GRANULOCYTES %: 0.5 % (ref 0–5)
LDL CHOLESTEROL CALCULATED: 98 MG/DL (ref 0–99)
LYMPHOCYTES ABSOLUTE: 0.72 E9/L (ref 1.5–4)
LYMPHOCYTES RELATIVE PERCENT: 10.9 % (ref 20–42)
MAGNESIUM: 1.9 MG/DL (ref 1.6–2.6)
MCH RBC QN AUTO: 31.6 PG (ref 26–35)
MCHC RBC AUTO-ENTMCNC: 33.2 % (ref 32–34.5)
MCV RBC AUTO: 95.2 FL (ref 80–99.9)
MONOCYTES ABSOLUTE: 0.68 E9/L (ref 0.1–0.95)
MONOCYTES RELATIVE PERCENT: 10.3 % (ref 2–12)
NEUTROPHILS ABSOLUTE: 4.8 E9/L (ref 1.8–7.3)
NEUTROPHILS RELATIVE PERCENT: 72.6 % (ref 43–80)
PDW BLD-RTO: 12.5 FL (ref 11.5–15)
PHOSPHORUS: 3.4 MG/DL (ref 2.5–4.5)
PLATELET # BLD: 231 E9/L (ref 130–450)
PMV BLD AUTO: 10 FL (ref 7–12)
POTASSIUM SERPL-SCNC: 4 MMOL/L (ref 3.5–5)
RBC # BLD: 3.99 E12/L (ref 3.8–5.8)
SODIUM BLD-SCNC: 138 MMOL/L (ref 132–146)
TOTAL PROTEIN: 6 G/DL (ref 6.4–8.3)
TRIGL SERPL-MCNC: 252 MG/DL (ref 0–149)
VANCOMYCIN TROUGH: 18.7 MCG/ML (ref 5–16)
VLDLC SERPL CALC-MCNC: 50 MG/DL
WBC # BLD: 6.6 E9/L (ref 4.5–11.5)

## 2023-02-14 PROCEDURE — 71045 X-RAY EXAM CHEST 1 VIEW: CPT

## 2023-02-14 PROCEDURE — 80202 ASSAY OF VANCOMYCIN: CPT

## 2023-02-14 PROCEDURE — 2580000003 HC RX 258: Performed by: INTERNAL MEDICINE

## 2023-02-14 PROCEDURE — 80061 LIPID PANEL: CPT

## 2023-02-14 PROCEDURE — C9113 INJ PANTOPRAZOLE SODIUM, VIA: HCPCS

## 2023-02-14 PROCEDURE — 6370000000 HC RX 637 (ALT 250 FOR IP): Performed by: INTERNAL MEDICINE

## 2023-02-14 PROCEDURE — 85025 COMPLETE CBC W/AUTO DIFF WBC: CPT

## 2023-02-14 PROCEDURE — A4216 STERILE WATER/SALINE, 10 ML: HCPCS

## 2023-02-14 PROCEDURE — 6360000002 HC RX W HCPCS: Performed by: INTERNAL MEDICINE

## 2023-02-14 PROCEDURE — 84100 ASSAY OF PHOSPHORUS: CPT

## 2023-02-14 PROCEDURE — 2000000000 HC ICU R&B

## 2023-02-14 PROCEDURE — 97165 OT EVAL LOW COMPLEX 30 MIN: CPT

## 2023-02-14 PROCEDURE — 80053 COMPREHEN METABOLIC PANEL: CPT

## 2023-02-14 PROCEDURE — 6360000002 HC RX W HCPCS

## 2023-02-14 PROCEDURE — 2700000000 HC OXYGEN THERAPY PER DAY

## 2023-02-14 PROCEDURE — 94640 AIRWAY INHALATION TREATMENT: CPT

## 2023-02-14 PROCEDURE — 2500000003 HC RX 250 WO HCPCS: Performed by: INTERNAL MEDICINE

## 2023-02-14 PROCEDURE — 2580000003 HC RX 258

## 2023-02-14 PROCEDURE — 97530 THERAPEUTIC ACTIVITIES: CPT

## 2023-02-14 PROCEDURE — 36592 COLLECT BLOOD FROM PICC: CPT

## 2023-02-14 PROCEDURE — 2580000003 HC RX 258: Performed by: STUDENT IN AN ORGANIZED HEALTH CARE EDUCATION/TRAINING PROGRAM

## 2023-02-14 PROCEDURE — 83735 ASSAY OF MAGNESIUM: CPT

## 2023-02-14 PROCEDURE — 94660 CPAP INITIATION&MGMT: CPT

## 2023-02-14 PROCEDURE — 92526 ORAL FUNCTION THERAPY: CPT

## 2023-02-14 RX ORDER — PANTOPRAZOLE SODIUM 40 MG/1
40 TABLET, DELAYED RELEASE ORAL
Status: DISCONTINUED | OUTPATIENT
Start: 2023-02-15 | End: 2023-02-21 | Stop reason: HOSPADM

## 2023-02-14 RX ADMIN — MEROPENEM 1000 MG: 1 INJECTION, POWDER, FOR SOLUTION INTRAVENOUS at 04:08

## 2023-02-14 RX ADMIN — PRAMIPEXOLE DIHYDROCHLORIDE 0.12 MG: 0.12 TABLET ORAL at 08:29

## 2023-02-14 RX ADMIN — ENOXAPARIN SODIUM 60 MG: 100 INJECTION SUBCUTANEOUS at 20:52

## 2023-02-14 RX ADMIN — LEVOTHYROXINE SODIUM 175 MCG: 0.17 TABLET ORAL at 05:20

## 2023-02-14 RX ADMIN — ENOXAPARIN SODIUM 60 MG: 100 INJECTION SUBCUTANEOUS at 08:27

## 2023-02-14 RX ADMIN — Medication 0.7 MCG/KG/HR: at 02:11

## 2023-02-14 RX ADMIN — Medication 0.5 MCG/KG/HR: at 09:33

## 2023-02-14 RX ADMIN — LEVOFLOXACIN 750 MG: 5 INJECTION, SOLUTION INTRAVENOUS at 13:03

## 2023-02-14 RX ADMIN — BACLOFEN 10 MG: 10 TABLET ORAL at 08:27

## 2023-02-14 RX ADMIN — IPRATROPIUM BROMIDE AND ALBUTEROL SULFATE 1 AMPULE: .5; 2.5 SOLUTION RESPIRATORY (INHALATION) at 22:47

## 2023-02-14 RX ADMIN — BUDESONIDE 500 MCG: 0.5 SUSPENSION RESPIRATORY (INHALATION) at 04:50

## 2023-02-14 RX ADMIN — IPRATROPIUM BROMIDE AND ALBUTEROL SULFATE 1 AMPULE: .5; 2.5 SOLUTION RESPIRATORY (INHALATION) at 01:31

## 2023-02-14 RX ADMIN — IPRATROPIUM BROMIDE AND ALBUTEROL SULFATE 1 AMPULE: .5; 2.5 SOLUTION RESPIRATORY (INHALATION) at 12:34

## 2023-02-14 RX ADMIN — VANCOMYCIN HYDROCHLORIDE 1250 MG: 10 INJECTION, POWDER, LYOPHILIZED, FOR SOLUTION INTRAVENOUS at 16:42

## 2023-02-14 RX ADMIN — GABAPENTIN 600 MG: 300 CAPSULE ORAL at 20:51

## 2023-02-14 RX ADMIN — VANCOMYCIN HYDROCHLORIDE 1250 MG: 10 INJECTION, POWDER, LYOPHILIZED, FOR SOLUTION INTRAVENOUS at 08:38

## 2023-02-14 RX ADMIN — Medication 10 ML: at 20:59

## 2023-02-14 RX ADMIN — METOPROLOL TARTRATE 25 MG: 25 TABLET, FILM COATED ORAL at 08:28

## 2023-02-14 RX ADMIN — IPRATROPIUM BROMIDE AND ALBUTEROL SULFATE 1 AMPULE: .5; 2.5 SOLUTION RESPIRATORY (INHALATION) at 04:50

## 2023-02-14 RX ADMIN — HYDROCODONE BITARTRATE AND ACETAMINOPHEN 1 TABLET: 10; 325 TABLET ORAL at 10:44

## 2023-02-14 RX ADMIN — GABAPENTIN 600 MG: 300 CAPSULE ORAL at 08:29

## 2023-02-14 RX ADMIN — Medication 0.7 MCG/KG/HR: at 05:53

## 2023-02-14 RX ADMIN — MEROPENEM 1000 MG: 1 INJECTION, POWDER, FOR SOLUTION INTRAVENOUS at 19:50

## 2023-02-14 RX ADMIN — BACLOFEN 10 MG: 10 TABLET ORAL at 20:51

## 2023-02-14 RX ADMIN — METOPROLOL TARTRATE 25 MG: 25 TABLET, FILM COATED ORAL at 20:51

## 2023-02-14 RX ADMIN — PRAMIPEXOLE DIHYDROCHLORIDE 0.12 MG: 0.12 TABLET ORAL at 13:05

## 2023-02-14 RX ADMIN — Medication 10 ML: at 08:29

## 2023-02-14 RX ADMIN — BUDESONIDE 500 MCG: 0.5 SUSPENSION RESPIRATORY (INHALATION) at 16:16

## 2023-02-14 RX ADMIN — PRAMIPEXOLE DIHYDROCHLORIDE 0.12 MG: 0.12 TABLET ORAL at 20:50

## 2023-02-14 RX ADMIN — HYDROCODONE BITARTRATE AND ACETAMINOPHEN 1 TABLET: 10; 325 TABLET ORAL at 18:25

## 2023-02-14 RX ADMIN — BACLOFEN 10 MG: 10 TABLET ORAL at 13:05

## 2023-02-14 RX ADMIN — IPRATROPIUM BROMIDE AND ALBUTEROL SULFATE 1 AMPULE: .5; 2.5 SOLUTION RESPIRATORY (INHALATION) at 08:38

## 2023-02-14 RX ADMIN — VANCOMYCIN HYDROCHLORIDE 1250 MG: 10 INJECTION, POWDER, LYOPHILIZED, FOR SOLUTION INTRAVENOUS at 00:26

## 2023-02-14 RX ADMIN — IPRATROPIUM BROMIDE AND ALBUTEROL SULFATE 1 AMPULE: .5; 2.5 SOLUTION RESPIRATORY (INHALATION) at 16:16

## 2023-02-14 RX ADMIN — SODIUM CHLORIDE, PRESERVATIVE FREE 40 MG: 5 INJECTION INTRAVENOUS at 08:27

## 2023-02-14 RX ADMIN — MEROPENEM 1000 MG: 1 INJECTION, POWDER, FOR SOLUTION INTRAVENOUS at 11:12

## 2023-02-14 ASSESSMENT — PAIN SCALES - GENERAL
PAINLEVEL_OUTOF10: 7
PAINLEVEL_OUTOF10: 6
PAINLEVEL_OUTOF10: 0
PAINLEVEL_OUTOF10: 8
PAINLEVEL_OUTOF10: 5
PAINLEVEL_OUTOF10: 6
PAINLEVEL_OUTOF10: 0
PAINLEVEL_OUTOF10: 7
PAINLEVEL_OUTOF10: 8
PAINLEVEL_OUTOF10: 7
PAINLEVEL_OUTOF10: 8
PAINLEVEL_OUTOF10: 0
PAINLEVEL_OUTOF10: 0

## 2023-02-14 ASSESSMENT — PAIN DESCRIPTION - LOCATION
LOCATION: GENERALIZED
LOCATION: BACK;LEG

## 2023-02-14 ASSESSMENT — PAIN DESCRIPTION - ORIENTATION
ORIENTATION: RIGHT;LEFT;POSTERIOR
ORIENTATION: RIGHT;LEFT;LOWER
ORIENTATION: MID;RIGHT;LEFT;LOWER
ORIENTATION: RIGHT;LEFT;POSTERIOR
ORIENTATION: RIGHT;LEFT;LOWER;POSTERIOR

## 2023-02-14 ASSESSMENT — PAIN DESCRIPTION - PAIN TYPE
TYPE: CHRONIC PAIN

## 2023-02-14 ASSESSMENT — PAIN DESCRIPTION - DESCRIPTORS
DESCRIPTORS: ACHING;DISCOMFORT
DESCRIPTORS: ACHING;DISCOMFORT;SPASM
DESCRIPTORS: ACHING;DISCOMFORT
DESCRIPTORS: ACHING;DISCOMFORT;HEAVINESS
DESCRIPTORS: ACHING;DISCOMFORT

## 2023-02-14 ASSESSMENT — PAIN DESCRIPTION - FREQUENCY: FREQUENCY: CONTINUOUS

## 2023-02-14 NOTE — PROGRESS NOTES
Pulmonary/Critical Care Progress Note    We are following patient for acute hypoxemic and hypercapnic respiratory failure (improved), obstructive sleep apnea, alveolar hypoventilation syndrome, morbid obesity, nicotine addiction, multiple sclerosis, history of alcohol and drug abuse, history of hypothyroidism, possible pneumonia-improved    SUBJECTIVE:  Patient continues to make small but steady steps toward improvement. His chest x-ray today looks the best it has been. There may be a small amount of right pleural fluid or atelectasis but by enlarge, lung fields are clear. He is more awake and alert and does not look nearly as tired as he did before. He continues on vancomycin and levofloxacin as well as meropenem. He has been weaned to an FiO2 of 50% and is tolerating this well. I emphasized to the nursing staff that we need to ask physical therapy to begin a more aggressive program with him.     MEDICATIONS:   [START ON 2/15/2023] pantoprazole  40 mg Oral QAM AC    gabapentin  600 mg Oral BID    levofloxacin  750 mg IntraVENous Q24H    nicotine  1 patch TransDERmal Daily    vancomycin  1,250 mg IntraVENous Q8H    meropenem  1,000 mg IntraVENous Q8H    baclofen  10 mg Oral TID    sodium chloride flush  5-40 mL IntraVENous 2 times per day    levothyroxine  175 mcg Oral Daily    metoprolol tartrate  25 mg Oral BID    pramipexole  0.125 mg Oral TID    enoxaparin  60 mg SubCUTAneous BID    ipratropium-albuterol  1 ampule Inhalation Q4H    sodium chloride flush  5-40 mL IntraVENous 2 times per day    budesonide  0.5 mg Nebulization BID      dexmedetomidine HCl in NaCl 0.5 mcg/kg/hr (02/14/23 0974)    dextrose      sodium chloride       HYDROcodone-acetaminophen, glucose, dextrose bolus **OR** dextrose bolus, glucagon (rDNA), dextrose, sodium chloride flush, polyethylene glycol, acetaminophen **OR** acetaminophen, sodium chloride flush, sodium chloride      REVIEW OF SYSTEMS:  Constitutional: Denies fever, weight loss, night sweats, and fatigue  Skin: Denies pigmentation, dark lesions, and rashes   HEENT: Denies hearing loss, tinnitus, ear drainage, epistaxis, sore throat, and hoarseness. Cardiovascular: Denies palpitations, chest pain, and chest pressure. Respiratory: Denies cough, dyspnea at rest, hemoptysis, apnea, and choking. Gastrointestinal: Denies nausea, vomiting, poor appetite, diarrhea, heartburn or reflux  Genitourinary: Denies dysuria, frequency, urgency or hematuria  Musculoskeletal: Denies myalgias, muscle weakness, and bone pain  Neurological: Denies dizziness, vertigo, headache, and focal weakness  Psychological: Denies anxiety and depression  Endocrine: Denies heat intolerance and cold intolerance  Hematopoietic/Lymphatic: Denies bleeding problems and blood transfusions    OBJECTIVE:  Vitals:    02/14/23 1200   BP: 108/84   Pulse: 67   Resp: 22   Temp: 99.5 °F (37.5 °C)   SpO2: 96%     FiO2 : 50 %  O2 Flow Rate (L/min): 50 L/min  O2 Device: Heated high flow cannula    PHYSICAL EXAM:  Constitutional: Low-grade fever, no chills, or diaphoresis  Skin: No skin rash, no skin breakdown  HEENT: Moist mucous membranes  Neck: Large neck with large circumference  Cardiovascular: S1, S2 regular. No S3 or rubs present  Respiratory: Few crackles right base, otherwise clear  Gastrointestinal: Soft, markedly obese, nontender  Genitourinary: No CVA tenderness  Extremities: No clubbing, cyanosis, or edema  Neurological: Awake, alert, oriented x3. Able to move all extremities but does have diffuse weakness consistent with multiple sclerosis. Psychological: Better spirits today.   Acceptable affect    LABS:  WBC   Date Value Ref Range Status   02/14/2023 6.6 4.5 - 11.5 E9/L Final   02/13/2023 7.3 4.5 - 11.5 E9/L Final   02/12/2023 7.8 4.5 - 11.5 E9/L Final     Hemoglobin   Date Value Ref Range Status   02/14/2023 12.6 12.5 - 16.5 g/dL Final   02/13/2023 12.7 12.5 - 16.5 g/dL Final   02/12/2023 12.7 12.5 - 16.5 g/dL Final     Hematocrit   Date Value Ref Range Status   02/14/2023 38.0 37.0 - 54.0 % Final   02/13/2023 40.0 37.0 - 54.0 % Final   02/12/2023 39.8 37.0 - 54.0 % Final     MCV   Date Value Ref Range Status   02/14/2023 95.2 80.0 - 99.9 fL Final   02/13/2023 96.2 80.0 - 99.9 fL Final   02/12/2023 98.8 80.0 - 99.9 fL Final     Platelets   Date Value Ref Range Status   02/14/2023 231 130 - 450 E9/L Final   02/13/2023 251 130 - 450 E9/L Final   02/12/2023 239 130 - 450 E9/L Final     Sodium   Date Value Ref Range Status   02/14/2023 138 132 - 146 mmol/L Final   02/13/2023 142 132 - 146 mmol/L Final   02/12/2023 145 132 - 146 mmol/L Final     Potassium   Date Value Ref Range Status   02/14/2023 4.0 3.5 - 5.0 mmol/L Final   02/13/2023 4.1 3.5 - 5.0 mmol/L Final   02/12/2023 4.1 3.5 - 5.0 mmol/L Final     Potassium reflex Magnesium   Date Value Ref Range Status   02/08/2023 4.1 3.5 - 5.0 mmol/L Final   12/10/2018 3.6 3.5 - 5.0 mmol/L Final     Chloride   Date Value Ref Range Status   02/14/2023 103 98 - 107 mmol/L Final   02/13/2023 105 98 - 107 mmol/L Final   02/12/2023 109 (H) 98 - 107 mmol/L Final     CO2   Date Value Ref Range Status   02/14/2023 30 (H) 22 - 29 mmol/L Final   02/13/2023 29 22 - 29 mmol/L Final   02/12/2023 30 (H) 22 - 29 mmol/L Final     BUN   Date Value Ref Range Status   02/14/2023 20 6 - 20 mg/dL Final   02/13/2023 17 6 - 20 mg/dL Final   02/12/2023 11 6 - 20 mg/dL Final     Creatinine   Date Value Ref Range Status   02/14/2023 0.8 0.7 - 1.2 mg/dL Final   02/13/2023 0.8 0.7 - 1.2 mg/dL Final   02/12/2023 0.8 0.7 - 1.2 mg/dL Final     Glucose   Date Value Ref Range Status   02/14/2023 142 (H) 74 - 99 mg/dL Final   02/13/2023 135 (H) 74 - 99 mg/dL Final   02/12/2023 149 (H) 74 - 99 mg/dL Final   05/30/2012 99 70 - 110 mg/dL Final   04/04/2012 89 70 - 110 mg/dL Final   01/13/2012 87 70 - 110 mg/dL Final     Calcium   Date Value Ref Range Status   02/14/2023 8.8 8.6 - 10.2 mg/dL Final   02/13/2023 8.7 8.6 - 10.2 mg/dL Final   02/12/2023 8.3 (L) 8.6 - 10.2 mg/dL Final     Total Protein   Date Value Ref Range Status   02/14/2023 6.0 (L) 6.4 - 8.3 g/dL Final   02/13/2023 6.2 (L) 6.4 - 8.3 g/dL Final   02/12/2023 6.1 (L) 6.4 - 8.3 g/dL Final     Albumin   Date Value Ref Range Status   02/14/2023 3.0 (L) 3.5 - 5.2 g/dL Final   02/13/2023 3.2 (L) 3.5 - 5.2 g/dL Final   02/12/2023 3.1 (L) 3.5 - 5.2 g/dL Final   05/30/2012 4.2 3.2 - 4.8 g/dL Final   04/04/2012 4.4 3.2 - 4.8 g/dL Final   12/08/2011 4.1 3.2 - 4.8 g/dL Final     Total Bilirubin   Date Value Ref Range Status   02/14/2023 0.5 0.0 - 1.2 mg/dL Final   02/13/2023 0.6 0.0 - 1.2 mg/dL Final   02/12/2023 0.5 0.0 - 1.2 mg/dL Final     Alkaline Phosphatase   Date Value Ref Range Status   02/14/2023 79 40 - 129 U/L Final   02/13/2023 76 40 - 129 U/L Final   02/12/2023 80 40 - 129 U/L Final     AST   Date Value Ref Range Status   02/14/2023 10 0 - 39 U/L Final   02/13/2023 14 0 - 39 U/L Final     Comment:     Specimen is slightly Hemolyzed. Result may be artificially increased. 02/12/2023 11 0 - 39 U/L Final     ALT   Date Value Ref Range Status   02/14/2023 12 0 - 40 U/L Final   02/13/2023 11 0 - 40 U/L Final   02/12/2023 11 0 - 40 U/L Final     Est, Glom Filt Rate   Date Value Ref Range Status   02/14/2023 >60 >=60 mL/min/1.73 Final     Comment:     Pediatric calculator link  [email or web address]_[email or web address]  Effective Oct 3, 2022  These results are not intended for use in patients  <25years of age. eGFR results are calculated without  a race factor using the 2021 CKD-EPI equation. Careful  clinical correlation is recommended, particularly when  comparing to results calculated using previous equations. The CKD-EPI equation is less accurate in patients with  extremes of muscle mass, extra-renal metabolism of  creatinine, excessive creatinine ingestion, or following  therapy that affects renal tubular secretion.      02/13/2023 >60 >=60 mL/min/1.73 Final     Comment:     Pediatric calculator link  [email or web address] [email or web address]/Make [email or web address]_[email or web address]  Effective Oct 3, 2022  These results are not intended for use in patients  <25years of age. eGFR results are calculated without  a race factor using the 2021 CKD-EPI equation. Careful  clinical correlation is recommended, particularly when  comparing to results calculated using previous equations. The CKD-EPI equation is less accurate in patients with  extremes of muscle mass, extra-renal metabolism of  creatinine, excessive creatinine ingestion, or following  therapy that affects renal tubular secretion. 02/12/2023 >60 >=60 mL/min/1.73 Final     Comment:     Pediatric calculator link  [email or web address]. org/professionals/Make [email or web address]_[email or web address]  Effective Oct 3, 2022  These results are not intended for use in patients  <25years of age. eGFR results are calculated without  a race factor using the 2021 CKD-EPI equation. Careful  clinical correlation is recommended, particularly when  comparing to results calculated using previous equations. The CKD-EPI equation is less accurate in patients with  extremes of muscle mass, extra-renal metabolism of  creatinine, excessive creatinine ingestion, or following  therapy that affects renal tubular secretion.        GFR    Date Value Ref Range Status   07/31/2019 >60  Final   04/04/2019 >60  Final   02/07/2019 >60  Final     Magnesium   Date Value Ref Range Status   02/14/2023 1.9 1.6 - 2.6 mg/dL Final   02/13/2023 1.9 1.6 - 2.6 mg/dL Final   02/12/2023 2.2 1.6 - 2.6 mg/dL Final     Phosphorus   Date Value Ref Range Status   02/14/2023 3.4 2.5 - 4.5 mg/dL Final   02/13/2023 2.8 2.5 - 4.5 mg/dL Final   02/12/2023 2.8 2.5 - 4.5 mg/dL Final     Recent Labs     02/13/23  0432   PH 7.433   PO2 86.2   PCO2 46.2*   HCO3 30.2*   BE 5.1*   O2SAT 96.0       RADIOLOGY:  XR CHEST PORTABLE   Final Result   Bilateral ground-glass infiltrates consistent with atelectasis, pulmonary   edema and/or pneumonitis. These are not significantly changed given the   differences in technique. Small bilateral pleural effusions right more than left, improved. Stable enlargement of the cardiac silhouette. XR CHEST PORTABLE   Final Result   Cardiomegaly, mild pulmonary venous hypertension and minimal perihilar hazy   opacity. XR CHEST PORTABLE   Final Result   Persistent subsegmental atelectasis in the right base with discrete increased   density in the right parahilar region. XR CHEST PORTABLE   Final Result   Unchanged atelectasis and or infiltrate suggested at the right base. Cardiomegaly. US DUP LOWER EXTREMITIES BILATERAL VENOUS   Final Result   No evidence of DVT in either lower extremity. XR CHEST PORTABLE   Final Result   Suboptimal inspiration with likely atelectasis at the right base. Indeterminate position of the nasogastric tube tip. Consider an abdominal   radiograph for this purpose. CT ABDOMEN PELVIS W IV CONTRAST Additional Contrast? None   Final Result   Grossly normal CT scan abdomen and pelvis. CT CHEST W CONTRAST   Final Result   Right lung atelectasis, of otherwise unremarkable CT scan chest.         XR ABDOMEN FOR NG/OG/NE TUBE PLACEMENT   Final Result   Nasogastric tube is poorly discerned on the abdominal films but CT chest   confirms that tip of this device terminates at the gastroesophageal junction. Preliminary report is provided via ancillary staff to a license caregiver on   02/08/2023 at 9:14 p.m. EST. XR CHEST PORTABLE   Final Result   1. Medical support devices as above. The enteric tube on this exam appears   to slightly retracted with distal tip projecting over the central mediastinum. (Suggest confirmation with dedicated radiographic evaluation of the lower   chest and upper abdomen and if confirmed, suggest repositioning.)      2. Atherosclerotic disease and prominence of the cardiac silhouette. There   are bilateral interstitial opacities which are unchanged. The findings were sent to the Radiology Results [address] at 6:55   pm on 2/8/2023 to be communicated to a licensed caregiver. XR CHEST PORTABLE   Final Result   ETT as noted above. CT HEAD WO CONTRAST   Final Result   No acute intracranial abnormality. XR CHEST PORTABLE   Final Result   No acute process. Mild cardiomegaly. PROBLEM LIST:  Principal Problem:    Acute encephalopathy  Resolved Problems:    * No resolved hospital problems. *      IMPRESSION:  Acute hypoxemic and hypercapnic respiratory failure  Morbid obesity  Suspect pneumonia right lower lobe which appears to be improving  COPD with exacerbation secondary to heavy and longtime cigarette smoking  Nicotine addiction  Likely obstructive sleep apnea  Multiple sclerosis    PLAN:  Continue vancomycin and levofloxacin  BiPAP at night  Airvo as tolerated during the day with attempts to wean the FiO2  Decreased IV fluids  Anticoagulation  Continue Lovenox  Continue inhaled steroids  Try to wean off Precedex    ATTESTATION:  ICU Staff Physician note of personal involvement in Care  As the attending physician, I certify that I personally reviewed the patients history and personally examined the patient to confirm the physical findings described above,  And that I reviewed the relevant imaging studies and available reports. I also discussed the differential diagnosis and all of the proposed management plans with the patient and individuals accompanying the patient to this visit. They had the opportunity to ask questions about the proposed management plans and to have those questions answered. This patient has a high probability of sudden, clinically significant deterioration, which requires the highest level of physician preparedness to intervene urgently.   I managed/supervised life or organ supporting interventions that required frequent physician assessment. I devoted my full attention to the direct care of this patient for the amount of time indicated below. Time I spent with the family or surrogate(s) is included only if the patient was incapable of providing the necessary information or participating in medical decisions - Time devoted to teaching and to any procedures I billed separately is not included.     CRITICAL CARE TIME:  38 minutes    Electronically signed by Elizabeth Su MD on 2/14/2023 at 12:56 PM

## 2023-02-14 NOTE — PROGRESS NOTES
SPEECH LANGUAGE PATHOLOGY  DAILY PROGRESS NOTE        PATIENT NAME:  Patience Patel      :  1965          TODAY'S DATE:  2023 ROOM:  [place] [address]    Patient seen for f/u for dysphagia mgmt. Patient agreeable to consume applesauce and thin liquids only despite max encouragement to trial solids. Patient exhibited latent cough x 1 w/ thin liquids when assisted by SLP. Patient self-fed remainder of thin liquids w/ no overt s/s of pen/aspiration noted. Patient tolerated pureed textures w/ no overt s/s of pen/aspiration. Patient exhibited excellent recall/follow through of recommended swallow strategies: small bites/sips, slow rate. Will cont w/ POC.        CPT code(s) 49062  dysphagia tx  Total minutes :    10 minutes    Sapna Blair M.S., CCC-SLP  Speech-Language Pathologist  [phone]

## 2023-02-14 NOTE — PLAN OF CARE
Problem: Pain  Goal: Verbalizes/displays adequate comfort level or baseline comfort level  2/14/2023 1504 by Dian Li RN  Outcome: Progressing  2/14/2023 0611 by Re Moreira RN  Outcome: Progressing     Problem: Nutrition Deficit:  Goal: Optimize nutritional status  Outcome: Progressing     Problem: Respiratory - Adult  Goal: Achieves optimal ventilation and oxygenation  2/14/2023 1504 by Dian Li RN  Outcome: Progressing  2/14/2023 0611 by Re Moreira RN  Outcome: Progressing     Problem: Genitourinary - Adult  Goal: Urinary catheter remains patent  2/14/2023 1504 by Dian Li RN  Outcome: Progressing  2/14/2023 0611 by Re Moreira RN  Outcome: Progressing     Problem: Safety - Adult  Goal: Free from fall injury  2/14/2023 1504 by Dian Li RN  Outcome: Progressing  2/14/2023 0611 by Re Moreira RN  Outcome: Progressing     Problem: ABCDS Injury Assessment  Goal: Absence of physical injury  2/14/2023 1504 by Dian Li RN  Outcome: Progressing  2/14/2023 0611 by Re Moreira RN  Outcome: Progressing

## 2023-02-14 NOTE — PROGRESS NOTES
Department of Internal Medicine  PN    PCP: Radha Montes De Oca DO  Admitting Physician: Dr. Anthony Wheeler  Consultants:   Date of Service: 2/8/2023    CHIEF COMPLAINT: Altered mental status    HISTORY OF PRESENT ILLNESS:    Patient is 64-year-old male who presented to the ED with altered mental status. HPI obtained from staff and chart review. Patient lives in the basement apparently. Mother stated that she has a room overhead where her son stays. States that she heard her son talking nonsensically and repeating phrases. She confronted him and tried to assist him. Later in the day patient went to the bathroom however did not come out and mother became concerned and called EMS. .  On presentation patient was able to answer questions although confused. patient was intubated due to protection of airway and need for further evaluation in the setting of agitation. 2/9/2023  Patient seen examined on ICU. Patient's family members at the bedside and case discussed. Case discussed with patient's nurse at the bedside. BUN/creatinine 16/1.0 with fasting blood sugar 195. Procalcitonin was 0.04. Drug screen positive for opiates with the patient on Percocet at home. WBC 16.7 hemoglobin 13.6. Urinalysis is unremarkable. Temperature 99 with heart rate 71 blood pressure 132/87. O2 sat 97% with the patient on assist control ventilator with a rate of 20 and 8 of PEEP and FiO2 55%. Urinary output is adequate. 2/10/2023  Patient seen examined in ICU. Patient still intubated and sedated. Patient currently with NG tube feedings. BUN/creatinine 15/1.1 with liver enzymes normal.  WBC is 9.8 with hemoglobin 12.3. Viral respiratory panel was negative. Temperature is 100 degrees with heart rate of 69 blood pressure 134/83. O2 sat 98% with the patient on assist control ventilator with a rate of 20 and 8 of PEEP with FiO2 45%. Urine output about 550 cc a shift.   Chest x-ray this morning shows unchanged atelectasis and/or infiltrate in the right base. 2/11/2023  Patient seen and examined on ICU. Case discussed with patient's nurse at the bedside. BUN/creatinine was 10/0.9 with normal electrolytes. Transaminases normal with WBC with fasting blood sugar 178. CBC is normal.  Temperature 99.7 with heart rate 76 and blood pressure 151/90. O2 sat 97% with patient assist control ventilator with rate of 20 and 8 of PEEP and FiO2 40%. Urinary output is good. Case discussed with intensivist today. We will attempt weaning off ventilator today. 2/12/2023  Patient seen examined on ICU. Patient was extubated yesterday afternoon. Patient is alert oriented very very weak and still very short of breath with any activity. Case discussed with patient nurse at the bedside. BUN/creatinine 11/0.8 with normal electrolytes. Liver enzymes are normal with WBC 7.8 hemoglobin 12.7. Temperature 99.9 with heart rate 73 and blood pressure 137/86. O2 sat 95% with the patient on BiPAP with FiO2 50%. 2/13/2023  Patient seen and examined on ICU. Patient is alert and oriented to person place. The patient is very weak and has not been out of bed yet. Patient stated he tolerated eating pudding this morning without problems. BUN/creatinine 17/0.8 with normal electrolytes. Liver enzymes are normal with a WBC of 7.3 and hemoglobin 12.7. Temperature is 100.4 with heart rate of 71 blood pressure 158/90. O2 sat 95% with the patient on heated high flow nasal cannula with FiO2 55%. Urine output ranges 350-500 cc a shift. Increase activity, consult PT/OT. 2/14/2023  Patient seen examined in the ICU. Patient's family is at the bedside and case discussed. The patient is alert and oriented x3. The patient though still very weak and tired. Patient says he got up yesterday but again is very weak and short of breath with any activity. Patient tolerating alternation of BiPAP and heated high flow nasal cannula. BUN/creatinine 20/0.8 with. Transaminases normal with fasting blood sugar 142. WBC 6.6 with hemoglobin 12.6. Temperature is 99.3 with heart rate of 67 blood pressure ranges 95//95. O2 sat is 93% with the patient on heated high flow nasal cannula with FiO2 55%. Patient currently on BiPAP every 4 hours and at at bedtime alternating with heated high flow nasal cannula. Urinary output is good. PAST MEDICAL Hx:  Past Medical History:   Diagnosis Date    Dermatophytosis 1/12/2023    Hyperlipidemia     Hypertension     Leg pain     Leg swelling 1/12/2023    Lymphedema     Lymphedema of both lower extremities 1/12/2023    MS (multiple sclerosis) ([facility] [address]     Multiple sclerosis ([facility] [address]     Multiple sclerosis ([facility] [address] 1/12/2023    With significant weakness of both legs follows up with [facility]    Spinal stenosis, lumbar     Thyroid disease     Tinea pedis of both feet 1/12/2023       PAST SURGICAL Hx:   Past Surgical History:   Procedure Laterality Date    HERNIA REPAIR      TONSILLECTOMY         FAMILY Hx:  No family history on file. HOME MEDICATIONS:  Prior to Admission medications    Medication Sig Start Date End Date Taking? Authorizing Provider   levothyroxine (SYNTHROID) 175 MCG tablet Take 175 mcg by mouth every morning (before breakfast) 8/27/18  Yes Historical Provider, MD   metFORMIN (GLUCOPHAGE) 500 MG tablet Take 500 mg by mouth 2 times daily 3/4/22  Yes Historical Provider, MD   Monomethyl Fumarate (BAFIERTAM) 95 MG CPDR Take 190 mg by mouth 2 times daily 1/24/23 1/24/24 Yes Historical Provider, MD   ADVAIR -21 MCG/ACT inhaler Inhale 2 puffs into the lungs in the morning and at bedtime 2/8/23   Historical Provider, MD   gabapentin (NEURONTIN) 600 MG tablet Take 1 tablet by mouth in the morning, at noon, and at bedtime. 12/26/22   Historical Provider, MD   HYDROcodone-acetaminophen (NORCO)  MG per tablet Take 1 tablet by mouth every 6 hours as needed.  2/6/23   Historical Provider, MD   torsemide (DEMADEX) 20 MG tablet Take 1 tablet by mouth daily 12/31/22   Historical Provider, MD   valsartan (DIOVAN) 160 MG tablet Take 1 tablet by mouth daily 12/5/22   Historical Provider, MD   miconazole nitrate 2 % OINT Apply topically 2 times daily Apply to the toes in between the toes both feet and both calfs up to the knee twice a day for 1 month 1/19/23   Seda Horne MD   spironolactone (ALDACTONE) 25 MG tablet Take 25 mg by mouth daily    Historical Provider, MD   pramipexole (MIRAPEX) 0.125 MG tablet Take 0.125 mg by mouth 3 times daily    Historical Provider, MD   baclofen (LIORESAL) 20 MG tablet Take 20 mg by mouth 3 times daily    Historical Provider, MD   metoprolol tartrate (LOPRESSOR) 25 MG tablet Take 25 mg by mouth 2 times daily    Historical Provider, MD   vitamin E 1000 UNITS capsule Take 1,000 Units by mouth daily. Historical Provider, MD   loratadine (CLARITIN) 10 MG tablet Take 10 mg by mouth daily.       Historical Provider, MD       ALLERGIES:  Bactrim [sulfamethoxazole-trimethoprim], Penicillins, and Sulfa antibiotics    SOCIAL Hx:  Social History     Socioeconomic History    Marital status:      Spouse name: Not on file    Number of children: Not on file    Years of education: Not on file    Highest education level: Not on file   Occupational History    Not on file   Tobacco Use    Smoking status: Every Day     Packs/day: 1.00     Types: Cigarettes    Smokeless tobacco: Never   Substance and Sexual Activity    Alcohol use: Yes     Comment: rarely    Drug use: Not Currently     Types: Marijuana [place])     Comment: edible    Sexual activity: Not on file   Other Topics Concern    Not on file   Social History Narrative    ** Merged History Encounter **          Social Determinants of Health     Financial Resource Strain: Not on file   Food Insecurity: Not on file   Transportation Needs: Not on file   Physical Activity: Not on file   Stress: Not on file   Social Connections: Not on file   Intimate Partner Violence: Not on file   Housing Stability: Not on file       ROS: Positive in bold  General:   Denies chills, fatigue, fever, malaise, night sweats or weight loss    Psychological:   Denies anxiety, disorientation or hallucinations    ENT:    Denies epistaxis, headaches, vertigo or visual changes    Cardiovascular:   Denies any chest pain, irregular heartbeats, or palpitations. No paroxysmal nocturnal dyspnea. Respiratory:   Denies shortness of breath, coughing, sputum production, hemoptysis, or wheezing. No orthopnea. Gastrointestinal:   Denies nausea, vomiting, diarrhea, or constipation. Denies any abdominal pain. Denies change in bowel habits or stools. Genito-Urinary:    Denies any urgency, frequency, hematuria. Voiding without difficulty. Musculoskeletal:   Denies joint pain, joint stiffness, joint swelling or muscle pain    Neurology:    Denies any headache or focal neurological deficits. No weakness or paresthesia. Derm:    Denies any rashes, ulcers, or excoriations. Denies bruising. Extremities:   Denies any lower extremity swelling or edema. PHYSICAL EXAM: Abnormal findings noted  VITALS:  Vitals:    02/14/23 0900   BP: (!) 140/98   Pulse: 67   Resp: 19   Temp:    SpO2: 93%         CONSTITUTIONAL:    Awake, alert, cooperative, no apparent distress, and appears stated age    Patient is intubated and sedated    EYES:    sclera clear, conjunctiva normal    ENT:    Normocephalic, atraumatic,  External ears without lesions.    Patient has OG tube and ET tube in place    NECK:    Supple, symmetrical, trachea midline,no JVD    HEMATOLOGIC/LYMPHATICS:    No cervical lymphadenopathy and no supraclavicular lymphadenopathy    LUNGS:    coarse decreased breath sounds to auscultation bilaterally, no wheezes, rhonchi, or rales,     CARDIOVASCULAR:    Normal apical impulse, regular rate and rhythm, normal S1 and S2, no S3 or S4, and no murmur noted    ABDOMEN:    , soft, non-distended, non-tender, morbidly obese    MUSCULOSKELETAL:    There is no redness, warmth, or swelling of the joints. NEUROLOGIC:    Awake, alert, oriented to name, place and time. Patient currently intubated and sedated    SKIN:    No bruising or bleeding. No redness, warmth, or swelling    EXTREMITIES:    Peripheral pulses present. No edema, cyanosis, or swelling.   Patient has bilateral lower extremity edema    LINES/CATHETERS   Patient has right IJ CVC  Whitaker catheter in place    LABORATORY DATA:  CBC with Differential:    Lab Results   Component Value Date/Time    WBC 6.6 02/14/2023 04:11 AM    RBC 3.99 02/14/2023 04:11 AM    HGB 12.6 02/14/2023 04:11 AM    HCT 38.0 02/14/2023 04:11 AM     02/14/2023 04:11 AM    MCV 95.2 02/14/2023 04:11 AM    MCH 31.6 02/14/2023 04:11 AM    MCHC 33.2 02/14/2023 04:11 AM    RDW 12.5 02/14/2023 04:11 AM    SEGSPCT 73 03/12/2014 08:10 AM    LYMPHOPCT 10.9 02/14/2023 04:11 AM    MONOPCT 10.3 02/14/2023 04:11 AM    EOSPCT 2 10/21/2010 10:25 AM    BASOPCT 0.6 02/14/2023 04:11 AM    MONOSABS 0.68 02/14/2023 04:11 AM    LYMPHSABS 0.72 02/14/2023 04:11 AM    EOSABS 0.34 02/14/2023 04:11 AM    BASOSABS 0.04 02/14/2023 04:11 AM     CMP:    Lab Results   Component Value Date/Time     02/14/2023 04:11 AM    K 4.0 02/14/2023 04:11 AM    K 4.1 02/08/2023 02:33 PM     02/14/2023 04:11 AM    CO2 30 02/14/2023 04:11 AM    BUN 20 02/14/2023 04:11 AM    CREATININE 0.8 02/14/2023 04:11 AM    GFRAA >60 07/31/2019 09:44 AM    LABGLOM >60 02/14/2023 04:11 AM    GLUCOSE 142 02/14/2023 04:11 AM    GLUCOSE 99 05/30/2012 08:37 AM    PROT 6.0 02/14/2023 04:11 AM    LABALBU 3.0 02/14/2023 04:11 AM    LABALBU 4.2 05/30/2012 08:37 AM    CALCIUM 8.8 02/14/2023 04:11 AM    BILITOT 0.5 02/14/2023 04:11 AM    ALKPHOS 79 02/14/2023 04:11 AM    AST 10 02/14/2023 04:11 AM    ALT 12 02/14/2023 04:11 AM       ASSESSMENT/PLAN:  Acute hypoxemic and hypercapnic respiratory failure requiring mechanical ventilation  Exacerbation of COPD with current tobacco abuse  Encephalopathy rule out meningitis  Community-acquired pneumonia  History of multiple sclerosis since age 28  Hypertension  Hyperlipidemia  Obesity hypoventilation syndrome  History of alcohol and drug abuse  Morbid obesity with BMI 56.60 kg/m²  Hypothyroidism      Patient presented with altered mental status. Patient became increasingly more confused and agitated. Patient was intubated in the ED. There is concern for meningitis. However lumbar puncture was unable to be performed. Patient placed on acyclovir, meropenem, vancomycin. Patient is immunocompromise in the setting of [employer] for his MS. Cultures ordered. Consider ID consultation. IR has been consulted for lumbar puncture. Critical care consulted and patient accepted to ICU. Further evaluation and management per critical care. Patient is awaiting transfer to [facility] pending bed availability.    -Home medication reviewed  -N.p.o. with patient intubated  -Lactated Ringer's at 75 cc an hour  -DuoNeb aerosols 4 times daily  -Pulmicort aerosol twice daily  -NG tube feedings    -Patient extubated 2/11 PM    -IV Merrem  -IV vancomycin  -IV Levaquin  -Lovenox 60 mg SQ twice daily  -PT/OT    -BMP, CBC in a.mFrances Faulkner DO  9:09 AM  2/14/2023

## 2023-02-14 NOTE — PROGRESS NOTES
[address] [facility]  [address] [place]. [place]        Date:2023                                                  Patient Name: Eulalio Avery    MRN: [id number]    : 1965    Room: [place] [address]      Evaluating OT: Ivanna Radford OTR/L; [id number]     Referring Provider and Specific Provider Orders/Date:      23  OT eval and treat  Start:  23,   End:  23 114,   ONE TIME,   Standing Count:  1 Occurrences,   R         Rayna Choe MD      Placement Recommendation: Subacute       Diagnosis:   1. Encephalitis         Surgery: none       Pertinent Medical History:       Past Medical History:   Diagnosis Date    Dermatophytosis 2023    Hyperlipidemia     Hypertension     Leg pain     Leg swelling 2023    Lymphedema     Lymphedema of both lower extremities 2023    MS (multiple sclerosis) ([address]     Multiple sclerosis ([address]     Multiple sclerosis ([address] 2023    With significant weakness of both legs follows up with [facility]    Spinal stenosis, lumbar     Thyroid disease     Tinea pedis of both feet 2023         Past Surgical History:   Procedure Laterality Date    HERNIA REPAIR      TONSILLECTOMY          Precautions:  Fall Risk, MS, Airvo, O2 saturation remained in the 90's during activity, /81.      Assessment of current deficits:     [x] Functional mobility  [x]ADLs  [x] Strength               []Cognition    [x] Functional transfers   [x] IADLs         [] Safety Awareness   [x]Endurance    [] Fine Coordination              [x] Balance      [] Vision/perception   []Sensation     []Gross Motor Coordination  [x] ROM  [] Delirium                   [x] Motor Control     OT PLAN OF CARE   OT POC based on physician orders, patient diagnosis and results of clinical assessment    Frequency/Duration 1-3 days/wk for 2 weeks PRN     Specific OT Treatment Interventions to include:   * Instruction/training on adapted ADL techniques and AE recommendations to increase functional independence within precautions       * Training on energy conservation strategies, correct breathing pattern and techniques to improve independence/tolerance for self-care routine  * Functional transfer/mobility training/DME recommendations for increased independence, safety, and fall prevention  * Patient/Family education to increase follow through with safety techniques and functional independence  * Recommendation of environmental modifications for increased safety with functional transfers/mobility and ADLs  * Therapeutic exercise to improve motor endurance, ROM, and functional strength for ADLs/functional transfers  * Therapeutic activities to facilitate/challenge dynamic balance, stand tolerance for increased safety and independence with ADLs  * Positioning to improve skin integrity, interaction with environment and functional independence    Recommended Adaptive Equipment: TBD at rehab      Home Living: with his mom; resides in the basement, full bathroom in the basement with a walk in shower, one flight of steps to the basement. Equipment owned: wheeled walker, shower chair, grab bars     Prior Level of Function: Independent with ADLs , Independent with IADLs; ambulated with no device, pt states he has dragged his R LE for the past 9 years. Driving: yes   Occupation: not working    Pain Level: 7/10 pain in back, chronic; Nursing notified.       Cognition: A&O: 3/4; Follows 1 step directions   Memory: fair    Sequencing: fair    Problem solving: fair    Judgement/safety: fair     [facility]   AM-PAC Daily Activity - Inpatient   How much help is needed for putting on and taking off regular lower body clothing?: Total  How much help is needed for bathing (which includes washing, rinsing, drying)?: A Lot  How much help is needed for toileting (which includes using toilet, bedpan, or urinal)?: Total  How much help is needed for putting on and taking off regular upper body clothing?: A Lot  How much help is needed for taking care of personal grooming?: A Little  How much help for eating meals?: None  AM-PAC Inpatient Daily Activity Raw Score: 13  AM-PAC Inpatient ADL T-Scale Score : 32.03  ADL Inpatient CMS 0-100% Score: 63.03  ADL Inpatient CMS G-Code Modifier : CL     Functional Assessment:    Initial Eval Status  Date: 2/14/23 Treatment Status  Date: STGs = LTGs  Time frame: 10-14 days   Feeding Supervision with set up   Independent    Grooming Minimal Assist to comb hair while seated EOB  Independent    UB Dressing Moderate Assist   Independent    LB Dressing Dependent   Minimal Assist    Bathing Maximal Assist  Minimal Assist    Toileting Dependent for hygiene. Currently has a catheter. Minimal Assist    Bed Mobility  Supine to sit: Maximal Assist x 2   Sit to supine: Maximal Assist x 2   Rolling: Maximal assist x 2 to adjust Comfort Glide and replace chux pads. Maximal assist x 2 to scoot up in bed. Supine to sit: Minimal Assist   Sit to supine: Minimal Assist    Functional Transfers Two attempts to stand from EOB with Maximal assist x 2, currently unable to lift hips off edge of bed. Minimal Assist    Functional Mobility N/T as pt was unable to stand. Minimal Assist    Balance Sitting:     Static: fair     Dynamic: fair minus  Standing: N/T      Activity Tolerance Fair minus  good    Visual/  Perceptual Glasses: yes                 Hand Dominance: right      AROM (PROM) Strength Additional Info:    RUE  WFL 4/5 good  and wfl FMC/dexterity noted during ADL tasks     LUE WFL 4/5 good  and wfl FMC/dexterity noted during ADL tasks       Hearing: WFL   Sensation:  No c/o numbness or tingling  Tone: WFL   Edema: yes, globally     Comments: Upon arrival the patient was supine. At end of session, patient was supine with call light and phone within reach, all lines and tubes intact.   Overall patient demonstrated decreased independence and safety during completion of ADL/functional transfer/mobility tasks. Pt would benefit from continued skilled OT to increase safety and independence with completion of ADL/IADL tasks for functional independence and quality of life. Treatment: OT treatment provided this date includes:   Instruction/training on safety and adapted techniques for completion of ADLs   Instruction/training on safe functional mobility/transfer techniques   Instruction/training on energy conservation/work simplification for completion of ADLs   Instruction/training on proper positioning/alignment to prevent contractures     Rehab Potential: Good for established goals. Patient / Family Goal: no goal stated       Patient and/or family were instructed on functional diagnosis, prognosis/goals and OT plan of care. Demonstrated good understanding. Eval Complexity: Low    Time In: 1:30pm  Time Out: 2:00pm   Total Treatment Time: 15      Min Units   OT Eval Low 97165  X  1    OT Eval Medium 23387      OT Eval High [id number]      OT Re-Eval [id number]            ADL/Self Care [id number]     Therapeutic Activities 98055  15  1    Therapeutic Ex 81555       Orthotic Management 19137       Manual [id number]     Neuro Re-Ed [id number]       Non-Billable Time        Evaluation Time additionally includes thorough review of current medical information, gathering information on past medical history/social history and prior level of function, interpretation of standardized testing/informal observation of tasks, assessment of data and development of plan of care and goals.         Evaluating OT: Gasper Cortes OTR/L; [id number]

## 2023-02-14 NOTE — PROGRESS NOTES
Physical Therapy  Physical Therapy Treatment Note/Plan of Care    Room #:  IC08/IC08-01  Patient Name: Seun Mendosa  YOB: 1965  MRN: [id number]    Date of Service: 2/14/2023     Tentative placement recommendation: Subacute Rehab  Equipment recommendation: To be determined      Evaluating Physical Therapist: Stephany Lynn PT  #[id number]      Specific Provider Orders/Date/Referring Provider :  02/13/23 1145    PT eval and treat  Start:  02/13/23 1145,   End:  02/13/23 1145,   ONE TIME,   Standing Count:  1 Occurrences,   Nina Cazares MD     Admitting Diagnosis:   Encephalitis [G04.90]  Acute encephalopathy [G93.40]     Admitted with    altered mental status , hx ms  right lung atelectasis  Intubated 2/8-2/11  Surgery: none  Visit Diagnoses         Codes    Encephalitis    -  Primary G04.90            Patient Active Problem List   Diagnosis    Leg swelling    Lymphedema of both lower extremities    Tinea pedis of both feet    Dermatophytosis    Multiple sclerosis ([address]    Acute encephalopathy        ASSESSMENT of Current Deficits Patient exhibits decreased strength, balance, endurance, range of motion, and coordination impairing functional mobility, transfers, gait , gait distance, and tolerance to activity are barriers to d/c and require skilled intervention to address concerns listed above to increase safety and independence at discharge. Pt able to sit EOB, max assist x2. Pt attempted to stand x2 reps with max assist x2, unable to elevate hips off bed.   Pt with impaired gait prior to illness d/t weakness in Right lower extremity \"I drag it\"  Overall debility, recent intubation and pre morbid mobility issues impact  functional independence       PHYSICAL THERAPY  PLAN OF CARE       Physical therapy plan of care is established based on physician order,  patient diagnosis and clinical assessment    Current Treatment Recommendations:    -Bed Mobility: Lower extremity exercises , Upper extremity exercises , and Trunk control activities   -Sitting Balance: Incorporate reaching activities to activate trunk muscles , Hands on support to maintain midline , Facilitate active trunk muscle engagement , Facilitate postural control in all planes , and Engage in core activities to allow for movement within base of support   -Standing Balance: Perform strengthening exercises in standing to promote motor control with or without upper extremity support  and Instruct patient on adequate base of support to maintain balance  -Transfers: Provide instruction on proper hand and foot position for adequate transfer of weight onto lower extremities and use of gait device if needed, Cues for hand placement, technique and safety. Provide stabilization to prevent fall , Support transfer of weight on to lower extremities, and Assist with extension of knees trunk and hip to accept weight transfer     PT long term treatment goals are located in below grid    Patient and or family understand(s) diagnosis, prognosis, and plan of care. Frequency of treatments: Patient will be seen  daily.          Prior Level of Function: Patient ambulated independently    Rehab Potential: good   for baseline    Past medical history:   Past Medical History:   Diagnosis Date    Dermatophytosis 1/12/2023    Hyperlipidemia     Hypertension     Leg pain     Leg swelling 1/12/2023    Lymphedema     Lymphedema of both lower extremities 1/12/2023    MS (multiple sclerosis) [address] [address]     Multiple sclerosis ([facility] [address]     Multiple sclerosis ([facility] [address] 1/12/2023    With significant weakness of both legs follows up with [facility]    Spinal stenosis, lumbar     Thyroid disease     Tinea pedis of both feet 1/12/2023     Past Surgical History:   Procedure Laterality Date    HERNIA REPAIR      TONSILLECTOMY         SUBJECTIVE:    Precautions: titrate/wean oxygen and pep flutter valve, falls, alarm, and O2 , air vo    Social history: Patient lives with mother and resides  in basement with one flight of steps       Equipment owned: unknown,       AM-PAC Basic Mobility        AM-PAC Basic Mobility - Inpatient   How much help is needed turning from your back to your side while in a flat bed without using bedrails?: A Lot  How much help is needed moving from lying on your back to sitting on the side of a flat bed without using bedrails?: A Lot  How much help is needed moving to and from a bed to a chair?: Total  How much help is needed standing up from a chair using your arms?: Total  How much help is needed walking in hospital room?: Total  How much help is needed climbing 3-5 steps with a railing?: Total  AM-PAC Inpatient Mobility Raw Score : 8  AM-PAC Inpatient T-Scale Score : 28.52  Mobility Inpatient CMS 0-100% Score: 86.62  Mobility Inpatient CMS G-Code Modifier : CM    Nursing cleared patient for PT treatment. OBJECTIVE;   Initial Evaluation  Date: 2/13/2023 Treatment Date:    2/14/2023   Short Term/ Long Term   Goals   Was pt agreeable to Eval/treatment? Yes Yes To be met in 5 days   Pain level   0/10    5/10  back    Bed Mobility    Rolling: Moderate assist of 1    Supine to sit: Maximal assist of 1    Sit to supine: Dependent of  2    Scooting: Maximal assist of 1   Rolling: Maximal assist of 1   Supine to sit: Maximal assist of  2   Sit to supine: Maximal assist of  2   Scooting: Maximal assist of  2    Rolling: Minimal assist of 1    Supine to sit: Minimal assist of 1    Sit to supine: Minimal assist of 1    Scooting: Minimal assist of 1     Transfers Sit to stand: Not assessed    Sit to stand: attempted but unable to elevated hip off bed with max assist x2. Sit to stand:  Moderate assist of 1     Ambulation    not assessed  not assessed      10 feet using  wheeled walker with Moderate assist of  2    ROM impaired   Increase range of motion 10% of affected joints    Strength BUE:   3/5  RLE:  1/5  LLE:  2/5  Increase strength in affected mm groups by 1/3 grade   Balance Sitting EOB:  poor multiplane instability with poor sense of upright  Dynamic Standing:  not assessed   Sitting EOB: fair -, right lean noted, min/mod assist to correct  Dynamic Standing: not assessed    Sitting EOB:  fair        Patient is Alert & Oriented x person, place, and time and follows one step directions    Sensation:  Patient  denies numbness/tingling   Edema:  yes bilateral lower extremities and right hand   Endurance: poor fatigues easily    Vitals: Heated high flow 50 L/min 50%  Blood Pressure at rest 122/81 Blood Pressure during session    Heart Rate at rest  66 Heart Rate during session     SPO2 at rest 95%  SPO2 during session 86-94%     Patient education  Patient educated on role of Physical Therapy, risks of immobility, safety and plan of care, importance of positional changes for oxygen exchange,  importance of mobility while in hospital , safety , and positioning for skin integrity and comfort     Patient response to education:   Pt verbalized understanding Pt demonstrated skill Pt requires further education in this area   Yes Partial Yes      Treatment:  Patient practiced and was instructed/facilitated in the following treatment: Patient   Sat edge of bed 15 minutes with Moderate assist of 1 to increase dynamic sitting balance and activity tolerance. Progressing to min A and then to SBA. Once positioned with feet flat on floor. Pt attempted to stand x2 reps, unable. Pt assisted back to supine and assist to Hob, rolled x2 reps to realign linens. Therapeutic Exercises:  not performed     At end of session, patient in bed with  call light and phone within reach,  all lines and tubes intact, nursing notified. Patient would benefit from continued skilled Physical Therapy to improve functional independence and quality of life.          Patient's/ family goals   none stated    Time in  1330  Time out  1400    Total Treatment Time  30 minutes    CPT codes:  Therapeutic activities ()   30 minutes  2 unit(s)    Brianda Bowman, [address] [facility]  #[id number]

## 2023-02-14 NOTE — PROGRESS NOTES
ADDENDUM:  Trough @ 1540 = 18.7 mcg/mL, AUC/BEAU 513. Continue same. Yovany BhardwajksSHILO.,5/05/5324 4:38 PM      Pharmacy Consultation Note  (Antibiotic Dosing and Monitoring)    Initial consult date: 02/08/2023  Consulting physician/provider: Rice  Drug: Vancomycin  Indication: Central Nervous System Infection     Age/  Gender Height Weight IBW  Allergy Information   57 y.o./male 5' 10\" (177.8 cm) (!) 380 lb (172.4 kg)     Ideal body weight: 73 kg (160 lb 15 oz)  Adjusted ideal body weight: 119.6 kg (263 lb 10.9 oz)   Bactrim [sulfamethoxazole-trimethoprim], Penicillins, and Sulfa antibiotics      Renal Function:  Recent Labs     02/12/23  0416 02/13/23  0428 02/14/23  0411   BUN 11 17 20   CREATININE 0.8 0.8 0.8       Intake/Output Summary (Last 24 hours) at 2/14/2023 1627  Last data filed at 2/14/2023 1450  Gross per 24 hour   Intake 854.79 ml   Output 1300 ml   Net -445.21 ml     Vancomycin Monitoring:  Trough:    Recent Labs     02/14/23  1540   VANCOTROUGH 18.7*     Random:  No results for input(s): VANCORANDOM in the last 72 hours. Recent Labs     02/12/23  1330   BLOODCULT2 24 Hours no growth      Historical Cultures:  No results found for: ORG  Recent Labs     02/12/23  1323   BC 24 Hours no growth     Recent vancomycin administrations                     vancomycin (VANCOCIN) 1,250 mg in sodium chloride 0.9 % 250 mL IVPB (mg) 1,250 mg New Bag 02/14/23 0838     1,250 mg New Bag  0026     1,250 mg New Bag 02/13/23 1611     1,250 mg New Bag  0818     1,250 mg New Bag  0005     1,250 mg New Bag 02/12/23 1814     1,250 mg New Bag  0848     1,250 mg New Bag  0141     1,250 mg New Bag 02/11/23 1710             Assessment:  Patient is a 65 y.o. male who has been initiated on vancomycin  Estimated Creatinine Clearance: 174 mL/min (based on SCr of 0.8 mg/dL).   To dose vancomycin, pharmacy will be utilizing [employer] calculation software for goal AUC/BEAU 400-600 mg/L-hr  2/10: Random AM level = 8.6 mcg/mL. AUC/BEAU 290 mg/L.hr. Est true trough of 4.5 mcg/mL. 2/11: Trough @ 0175 = 14.2 mcg/mL, AUC/BEAU 459 mg/L-hr  2/14: Level ordered for 0800 was not drawn prior to administration of the dose. Plan:  Continue Vancomycin 1250 mg IV q8hr  Re-timed level for today @ 1600.  Hold dose if level > 20 mcg/mL  Will continue to monitor renal function   Pharmacy to follow    Cristine Martinez PharmD, BCPS 2/14/2023 4:27 PM   Ext: [phone]

## 2023-02-14 NOTE — CARE COORDINATION
2/14/23 ICU, vapotherm, precedex gtt. Pt with Multiple Sclerosis. [employer] coverage difficult for LTACH- approvals (gave referral to Saad to follow). May need SNF stay prior to discharge to home. May need oxygen at home- if discharge right to home. Been in touch with daughter Olya and pt about these needs/Medicare approved lists provided. CM following- will again speak to pt and Olya about SNF at discharge- need oxygen below 10 liters for SNF discharge.   Electronically signed by VINITA Saenz on 2/14/2023 at 9:44 AM      Addendum: If oxygen needed at home at discharge- they would like [employer]. Electronically signed by VINITA Saenz on 2/14/2023 at 10:47 AM

## 2023-02-14 NOTE — PLAN OF CARE
Problem: Pain  Goal: Verbalizes/displays adequate comfort level or baseline comfort level  Outcome: Progressing     Problem: Respiratory - Adult  Goal: Achieves optimal ventilation and oxygenation  Outcome: Progressing     Problem: Genitourinary - Adult  Goal: Urinary catheter remains patent  Outcome: Progressing     Problem: Safety - Adult  Goal: Free from fall injury  Outcome: Progressing     Problem: ABCDS Injury Assessment  Goal: Absence of physical injury  Outcome: Progressing

## 2023-02-15 LAB
ALBUMIN SERPL-MCNC: 3 G/DL (ref 3.5–5.2)
ALP BLD-CCNC: 81 U/L (ref 40–129)
ALT SERPL-CCNC: 12 U/L (ref 0–40)
ANION GAP SERPL CALCULATED.3IONS-SCNC: 6 MMOL/L (ref 7–16)
AST SERPL-CCNC: 20 U/L (ref 0–39)
BASOPHILS ABSOLUTE: 0.04 E9/L (ref 0–0.2)
BASOPHILS RELATIVE PERCENT: 0.6 % (ref 0–2)
BILIRUB SERPL-MCNC: 0.4 MG/DL (ref 0–1.2)
BUN BLDV-MCNC: 17 MG/DL (ref 6–20)
CALCIUM SERPL-MCNC: 8.5 MG/DL (ref 8.6–10.2)
CHLORIDE BLD-SCNC: 104 MMOL/L (ref 98–107)
CHOLESTEROL, TOTAL: 179 MG/DL (ref 0–199)
CO2: 31 MMOL/L (ref 22–29)
CREAT SERPL-MCNC: 0.8 MG/DL (ref 0.7–1.2)
EOSINOPHILS ABSOLUTE: 0.39 E9/L (ref 0.05–0.5)
EOSINOPHILS RELATIVE PERCENT: 5.7 % (ref 0–6)
GFR SERPL CREATININE-BSD FRML MDRD: >60 ML/MIN/1.73
GLUCOSE BLD-MCNC: 126 MG/DL (ref 74–99)
HCT VFR BLD CALC: 39.5 % (ref 37–54)
HDLC SERPL-MCNC: 20 MG/DL
HEMOGLOBIN: 12.8 G/DL (ref 12.5–16.5)
IMMATURE GRANULOCYTES #: 0.03 E9/L
IMMATURE GRANULOCYTES %: 0.4 % (ref 0–5)
LDL CHOLESTEROL CALCULATED: 104 MG/DL (ref 0–99)
LYMPHOCYTES ABSOLUTE: 0.68 E9/L (ref 1.5–4)
LYMPHOCYTES RELATIVE PERCENT: 10 % (ref 20–42)
MAGNESIUM: 1.9 MG/DL (ref 1.6–2.6)
MCH RBC QN AUTO: 30.5 PG (ref 26–35)
MCHC RBC AUTO-ENTMCNC: 32.4 % (ref 32–34.5)
MCV RBC AUTO: 94.3 FL (ref 80–99.9)
MONOCYTES ABSOLUTE: 0.72 E9/L (ref 0.1–0.95)
MONOCYTES RELATIVE PERCENT: 10.6 % (ref 2–12)
NEUTROPHILS ABSOLUTE: 4.93 E9/L (ref 1.8–7.3)
NEUTROPHILS RELATIVE PERCENT: 72.7 % (ref 43–80)
PDW BLD-RTO: 12.5 FL (ref 11.5–15)
PHOSPHORUS: 3 MG/DL (ref 2.5–4.5)
PLATELET # BLD: 253 E9/L (ref 130–450)
PMV BLD AUTO: 10.5 FL (ref 7–12)
POTASSIUM SERPL-SCNC: 4.4 MMOL/L (ref 3.5–5)
RBC # BLD: 4.19 E12/L (ref 3.8–5.8)
SODIUM BLD-SCNC: 141 MMOL/L (ref 132–146)
TOTAL PROTEIN: 6.1 G/DL (ref 6.4–8.3)
TRIGL SERPL-MCNC: 277 MG/DL (ref 0–149)
VLDLC SERPL CALC-MCNC: 55 MG/DL
WBC # BLD: 6.8 E9/L (ref 4.5–11.5)

## 2023-02-15 PROCEDURE — 80061 LIPID PANEL: CPT

## 2023-02-15 PROCEDURE — 2700000000 HC OXYGEN THERAPY PER DAY

## 2023-02-15 PROCEDURE — 94660 CPAP INITIATION&MGMT: CPT

## 2023-02-15 PROCEDURE — 6360000002 HC RX W HCPCS: Performed by: INTERNAL MEDICINE

## 2023-02-15 PROCEDURE — 2580000003 HC RX 258

## 2023-02-15 PROCEDURE — 84100 ASSAY OF PHOSPHORUS: CPT

## 2023-02-15 PROCEDURE — 2000000000 HC ICU R&B

## 2023-02-15 PROCEDURE — 6370000000 HC RX 637 (ALT 250 FOR IP): Performed by: INTERNAL MEDICINE

## 2023-02-15 PROCEDURE — 2580000003 HC RX 258: Performed by: STUDENT IN AN ORGANIZED HEALTH CARE EDUCATION/TRAINING PROGRAM

## 2023-02-15 PROCEDURE — 6360000002 HC RX W HCPCS

## 2023-02-15 PROCEDURE — 2580000003 HC RX 258: Performed by: INTERNAL MEDICINE

## 2023-02-15 PROCEDURE — 83735 ASSAY OF MAGNESIUM: CPT

## 2023-02-15 PROCEDURE — 6370000000 HC RX 637 (ALT 250 FOR IP)

## 2023-02-15 PROCEDURE — 80053 COMPREHEN METABOLIC PANEL: CPT

## 2023-02-15 PROCEDURE — 94640 AIRWAY INHALATION TREATMENT: CPT

## 2023-02-15 PROCEDURE — 85025 COMPLETE CBC W/AUTO DIFF WBC: CPT

## 2023-02-15 PROCEDURE — 94669 MECHANICAL CHEST WALL OSCILL: CPT

## 2023-02-15 RX ORDER — LEVOFLOXACIN 750 MG/1
750 TABLET ORAL DAILY
Status: DISCONTINUED | OUTPATIENT
Start: 2023-02-16 | End: 2023-02-21 | Stop reason: HOSPADM

## 2023-02-15 RX ADMIN — ENOXAPARIN SODIUM 60 MG: 100 INJECTION SUBCUTANEOUS at 08:49

## 2023-02-15 RX ADMIN — Medication 10 ML: at 08:18

## 2023-02-15 RX ADMIN — LEVOTHYROXINE SODIUM 175 MCG: 0.17 TABLET ORAL at 06:43

## 2023-02-15 RX ADMIN — METOPROLOL TARTRATE 25 MG: 25 TABLET, FILM COATED ORAL at 08:48

## 2023-02-15 RX ADMIN — GABAPENTIN 600 MG: 300 CAPSULE ORAL at 08:48

## 2023-02-15 RX ADMIN — MEROPENEM 1000 MG: 1 INJECTION, POWDER, FOR SOLUTION INTRAVENOUS at 03:15

## 2023-02-15 RX ADMIN — IPRATROPIUM BROMIDE AND ALBUTEROL SULFATE 1 AMPULE: .5; 2.5 SOLUTION RESPIRATORY (INHALATION) at 18:04

## 2023-02-15 RX ADMIN — ENOXAPARIN SODIUM 60 MG: 100 INJECTION SUBCUTANEOUS at 20:28

## 2023-02-15 RX ADMIN — BACLOFEN 10 MG: 10 TABLET ORAL at 08:48

## 2023-02-15 RX ADMIN — METOPROLOL TARTRATE 25 MG: 25 TABLET, FILM COATED ORAL at 20:27

## 2023-02-15 RX ADMIN — VANCOMYCIN HYDROCHLORIDE 1250 MG: 10 INJECTION, POWDER, LYOPHILIZED, FOR SOLUTION INTRAVENOUS at 08:26

## 2023-02-15 RX ADMIN — PRAMIPEXOLE DIHYDROCHLORIDE 0.12 MG: 0.12 TABLET ORAL at 08:49

## 2023-02-15 RX ADMIN — MEROPENEM 1000 MG: 1 INJECTION, POWDER, FOR SOLUTION INTRAVENOUS at 11:18

## 2023-02-15 RX ADMIN — Medication 10 ML: at 22:32

## 2023-02-15 RX ADMIN — Medication 10 ML: at 08:17

## 2023-02-15 RX ADMIN — IPRATROPIUM BROMIDE AND ALBUTEROL SULFATE 1 AMPULE: .5; 2.5 SOLUTION RESPIRATORY (INHALATION) at 02:08

## 2023-02-15 RX ADMIN — BUDESONIDE 500 MCG: 0.5 SUSPENSION RESPIRATORY (INHALATION) at 18:04

## 2023-02-15 RX ADMIN — VANCOMYCIN HYDROCHLORIDE 1250 MG: 10 INJECTION, POWDER, LYOPHILIZED, FOR SOLUTION INTRAVENOUS at 17:14

## 2023-02-15 RX ADMIN — MEROPENEM 1000 MG: 1 INJECTION, POWDER, FOR SOLUTION INTRAVENOUS at 18:47

## 2023-02-15 RX ADMIN — BUDESONIDE 500 MCG: 0.5 SUSPENSION RESPIRATORY (INHALATION) at 06:09

## 2023-02-15 RX ADMIN — BACLOFEN 10 MG: 10 TABLET ORAL at 20:27

## 2023-02-15 RX ADMIN — HYDROCODONE BITARTRATE AND ACETAMINOPHEN 1 TABLET: 10; 325 TABLET ORAL at 13:38

## 2023-02-15 RX ADMIN — IPRATROPIUM BROMIDE AND ALBUTEROL SULFATE 1 AMPULE: .5; 2.5 SOLUTION RESPIRATORY (INHALATION) at 22:27

## 2023-02-15 RX ADMIN — VANCOMYCIN HYDROCHLORIDE 1250 MG: 10 INJECTION, POWDER, LYOPHILIZED, FOR SOLUTION INTRAVENOUS at 00:21

## 2023-02-15 RX ADMIN — PRAMIPEXOLE DIHYDROCHLORIDE 0.12 MG: 0.12 TABLET ORAL at 13:39

## 2023-02-15 RX ADMIN — LEVOFLOXACIN 750 MG: 5 INJECTION, SOLUTION INTRAVENOUS at 13:20

## 2023-02-15 RX ADMIN — IPRATROPIUM BROMIDE AND ALBUTEROL SULFATE 1 AMPULE: .5; 2.5 SOLUTION RESPIRATORY (INHALATION) at 14:39

## 2023-02-15 RX ADMIN — IPRATROPIUM BROMIDE AND ALBUTEROL SULFATE 1 AMPULE: .5; 2.5 SOLUTION RESPIRATORY (INHALATION) at 09:12

## 2023-02-15 RX ADMIN — PRAMIPEXOLE DIHYDROCHLORIDE 0.12 MG: 0.12 TABLET ORAL at 20:27

## 2023-02-15 RX ADMIN — Medication 10 ML: at 22:31

## 2023-02-15 RX ADMIN — HYDROCODONE BITARTRATE AND ACETAMINOPHEN 1 TABLET: 10; 325 TABLET ORAL at 20:27

## 2023-02-15 RX ADMIN — IPRATROPIUM BROMIDE AND ALBUTEROL SULFATE 1 AMPULE: .5; 2.5 SOLUTION RESPIRATORY (INHALATION) at 06:08

## 2023-02-15 RX ADMIN — PANTOPRAZOLE SODIUM 40 MG: 40 TABLET, DELAYED RELEASE ORAL at 06:44

## 2023-02-15 RX ADMIN — GABAPENTIN 600 MG: 300 CAPSULE ORAL at 20:27

## 2023-02-15 RX ADMIN — HYDROCODONE BITARTRATE AND ACETAMINOPHEN 1 TABLET: 10; 325 TABLET ORAL at 06:46

## 2023-02-15 RX ADMIN — BACLOFEN 10 MG: 10 TABLET ORAL at 13:37

## 2023-02-15 RX ADMIN — HYDROCODONE BITARTRATE AND ACETAMINOPHEN 1 TABLET: 10; 325 TABLET ORAL at 00:19

## 2023-02-15 ASSESSMENT — PAIN SCALES - GENERAL
PAINLEVEL_OUTOF10: 0
PAINLEVEL_OUTOF10: 7
PAINLEVEL_OUTOF10: 8
PAINLEVEL_OUTOF10: 0
PAINLEVEL_OUTOF10: 2
PAINLEVEL_OUTOF10: 8
PAINLEVEL_OUTOF10: 0
PAINLEVEL_OUTOF10: 0

## 2023-02-15 ASSESSMENT — PAIN DESCRIPTION - ORIENTATION
ORIENTATION: RIGHT;LEFT;MID;LOWER
ORIENTATION: LOWER

## 2023-02-15 ASSESSMENT — PAIN DESCRIPTION - LOCATION
LOCATION: BACK
LOCATION: GENERALIZED
LOCATION: BACK;LEG

## 2023-02-15 ASSESSMENT — PAIN - FUNCTIONAL ASSESSMENT: PAIN_FUNCTIONAL_ASSESSMENT: ACTIVITIES ARE NOT PREVENTED

## 2023-02-15 ASSESSMENT — PAIN DESCRIPTION - DESCRIPTORS
DESCRIPTORS: ACHING
DESCRIPTORS: ACHING

## 2023-02-15 NOTE — PROGRESS NOTES
Department of Internal Medicine  PN    PCP: Virgen Boston DO  Admitting Physician: Dr. Alma Freeman  Consultants:   Date of Service: 2/8/2023    CHIEF COMPLAINT: Altered mental status    HISTORY OF PRESENT ILLNESS:    Patient is 69-year-old male who presented to the ED with altered mental status. HPI obtained from staff and chart review. Patient lives in the basement apparently. Mother stated that she has a room overhead where her son stays. States that she heard her son talking nonsensically and repeating phrases. She confronted him and tried to assist him. Later in the day patient went to the bathroom however did not come out and mother became concerned and called EMS. .  On presentation patient was able to answer questions although confused. patient was intubated due to protection of airway and need for further evaluation in the setting of agitation. 2/9/2023  Patient seen examined on ICU. Patient's family members at the bedside and case discussed. Case discussed with patient's nurse at the bedside. BUN/creatinine 16/1.0 with fasting blood sugar 195. Procalcitonin was 0.04. Drug screen positive for opiates with the patient on Percocet at home. WBC 16.7 hemoglobin 13.6. Urinalysis is unremarkable. Temperature 99 with heart rate 71 blood pressure 132/87. O2 sat 97% with the patient on assist control ventilator with a rate of 20 and 8 of PEEP and FiO2 55%. Urinary output is adequate. 2/10/2023  Patient seen examined in ICU. Patient still intubated and sedated. Patient currently with NG tube feedings. BUN/creatinine 15/1.1 with liver enzymes normal.  WBC is 9.8 with hemoglobin 12.3. Viral respiratory panel was negative. Temperature is 100 degrees with heart rate of 69 blood pressure 134/83. O2 sat 98% with the patient on assist control ventilator with a rate of 20 and 8 of PEEP with FiO2 45%. Urine output about 550 cc a shift.   Chest x-ray this morning shows unchanged atelectasis and/or infiltrate in the right base. 2/11/2023  Patient seen and examined on ICU. Case discussed with patient's nurse at the bedside. BUN/creatinine was 10/0.9 with normal electrolytes. Transaminases normal with WBC with fasting blood sugar 178. CBC is normal.  Temperature 99.7 with heart rate 76 and blood pressure 151/90. O2 sat 97% with patient assist control ventilator with rate of 20 and 8 of PEEP and FiO2 40%. Urinary output is good. Case discussed with intensivist today. We will attempt weaning off ventilator today. 2/12/2023  Patient seen examined on ICU. Patient was extubated yesterday afternoon. Patient is alert oriented very very weak and still very short of breath with any activity. Case discussed with patient nurse at the bedside. BUN/creatinine 11/0.8 with normal electrolytes. Liver enzymes are normal with WBC 7.8 hemoglobin 12.7. Temperature 99.9 with heart rate 73 and blood pressure 137/86. O2 sat 95% with the patient on BiPAP with FiO2 50%. 2/13/2023  Patient seen and examined on ICU. Patient is alert and oriented to person place. The patient is very weak and has not been out of bed yet. Patient stated he tolerated eating pudding this morning without problems. BUN/creatinine 17/0.8 with normal electrolytes. Liver enzymes are normal with a WBC of 7.3 and hemoglobin 12.7. Temperature is 100.4 with heart rate of 71 blood pressure 158/90. O2 sat 95% with the patient on heated high flow nasal cannula with FiO2 55%. Urine output ranges 350-500 cc a shift. Increase activity, consult PT/OT. 2/14/2023  Patient seen examined in the ICU. Patient's family is at the bedside and case discussed. The patient is alert and oriented x3. The patient though still very weak and tired. Patient says he got up yesterday but again is very weak and short of breath with any activity. Patient tolerating alternation of BiPAP and heated high flow nasal cannula. BUN/creatinine 20/0.8 with. Transaminases normal with fasting blood sugar 142. WBC 6.6 with hemoglobin 12.6. Temperature is 99.3 with heart rate of 67 blood pressure ranges 95//95. O2 sat is 93% with the patient on heated high flow nasal cannula with FiO2 55%. Patient currently on BiPAP every 4 hours and at at bedtime alternating with heated high flow nasal cannula. Urinary output is good. 2/15/2023  Patient seen examined on ICU. Patient's wife and daughters at the bedside and case discussed. The patient is awake and alert and oriented x3. Patient's oral intake is poor-fair. Patient still very very weak. BUN/creatinine 17/0.8 with normal electrolytes. Liver enzymes normal with normal transaminase. WBC 6.8 hemoglobin 12.8. Temperature 99.5 with heart rate 75 blood pressure 133/86. O2 sat 92% on-96% on heated high flow nasal cannula with FiO2 40%. Pulmonology, speech therapy note reviewed. PAST MEDICAL Hx:  Past Medical History:   Diagnosis Date    Dermatophytosis 1/12/2023    Hyperlipidemia     Hypertension     Leg pain     Leg swelling 1/12/2023    Lymphedema     Lymphedema of both lower extremities 1/12/2023    MS (multiple sclerosis) ([facility] [address]     Multiple sclerosis ([address]     Multiple sclerosis ([facility] [address] 1/12/2023    With significant weakness of both legs follows up with [facility]    Spinal stenosis, lumbar     Thyroid disease     Tinea pedis of both feet 1/12/2023       PAST SURGICAL Hx:   Past Surgical History:   Procedure Laterality Date    HERNIA REPAIR      TONSILLECTOMY         FAMILY Hx:  No family history on file. HOME MEDICATIONS:  Prior to Admission medications    Medication Sig Start Date End Date Taking?  Authorizing Provider   levothyroxine (SYNTHROID) 175 MCG tablet Take 175 mcg by mouth every morning (before breakfast) 8/27/18  Yes Historical Provider, MD   metFORMIN (GLUCOPHAGE) 500 MG tablet Take 500 mg by mouth 2 times daily 3/4/22  Yes Historical Provider, MD   Monomethyl Fumarate (BAFIERTAM) 95 MG CPDR Take 190 mg by mouth 2 times daily 1/24/23 1/24/24 Yes Historical Provider, MD   ADVAIR -21 MCG/ACT inhaler Inhale 2 puffs into the lungs in the morning and at bedtime 2/8/23   Historical Provider, MD   gabapentin (NEURONTIN) 600 MG tablet Take 1 tablet by mouth in the morning, at noon, and at bedtime. 12/26/22   Historical Provider, MD   HYDROcodone-acetaminophen (NORCO)  MG per tablet Take 1 tablet by mouth every 6 hours as needed. 2/6/23   Historical Provider, MD   torsemide (DEMADEX) 20 MG tablet Take 1 tablet by mouth daily 12/31/22   Historical Provider, MD   valsartan (DIOVAN) 160 MG tablet Take 1 tablet by mouth daily 12/5/22   Historical Provider, MD   miconazole nitrate 2 % OINT Apply topically 2 times daily Apply to the toes in between the toes both feet and both calfs up to the knee twice a day for 1 month 1/19/23   Kelton Joseph MD   spironolactone (ALDACTONE) 25 MG tablet Take 25 mg by mouth daily    Historical Provider, MD   pramipexole (MIRAPEX) 0.125 MG tablet Take 0.125 mg by mouth 3 times daily    Historical Provider, MD   baclofen (LIORESAL) 20 MG tablet Take 20 mg by mouth 3 times daily    Historical Provider, MD   metoprolol tartrate (LOPRESSOR) 25 MG tablet Take 25 mg by mouth 2 times daily    Historical Provider, MD   vitamin E 1000 UNITS capsule Take 1,000 Units by mouth daily. Historical Provider, MD   loratadine (CLARITIN) 10 MG tablet Take 10 mg by mouth daily.       Historical Provider, MD       ALLERGIES:  Bactrim [sulfamethoxazole-trimethoprim], Penicillins, and Sulfa antibiotics    SOCIAL Hx:  Social History     Socioeconomic History    Marital status:      Spouse name: Not on file    Number of children: Not on file    Years of education: Not on file    Highest education level: Not on file   Occupational History    Not on file   Tobacco Use    Smoking status: Every Day     Packs/day: 1.00     Types: Cigarettes    Smokeless tobacco: Never Substance and Sexual Activity    Alcohol use: Yes     Comment: rarely    Drug use: Not Currently     Types: Marijuana [place])     Comment: edible    Sexual activity: Not on file   Other Topics Concern    Not on file   Social History Narrative    ** Merged History Encounter **          Social Determinants of Health     Financial Resource Strain: Not on file   Food Insecurity: Not on file   Transportation Needs: Not on file   Physical Activity: Not on file   Stress: Not on file   Social Connections: Not on file   Intimate Partner Violence: Not on file   Housing Stability: Not on file       ROS: Positive in bold  General:   Denies chills, fatigue, fever, malaise, night sweats or weight loss    Psychological:   Denies anxiety, disorientation or hallucinations    ENT:    Denies epistaxis, headaches, vertigo or visual changes    Cardiovascular:   Denies any chest pain, irregular heartbeats, or palpitations. No paroxysmal nocturnal dyspnea. Respiratory:   Denies shortness of breath, coughing, sputum production, hemoptysis, or wheezing. No orthopnea. Gastrointestinal:   Denies nausea, vomiting, diarrhea, or constipation. Denies any abdominal pain. Denies change in bowel habits or stools. Genito-Urinary:    Denies any urgency, frequency, hematuria. Voiding without difficulty. Musculoskeletal:   Denies joint pain, joint stiffness, joint swelling or muscle pain    Neurology:    Denies any headache or focal neurological deficits. No weakness or paresthesia. Derm:    Denies any rashes, ulcers, or excoriations. Denies bruising. Extremities:   Denies any lower extremity swelling or edema.       PHYSICAL EXAM: Abnormal findings noted  VITALS:  Vitals:    02/15/23 1000   BP: 133/86   Pulse: 75   Resp: 19   Temp:    SpO2: 92%         CONSTITUTIONAL:    Awake, alert, cooperative, no apparent distress, and appears stated age    Patient is intubated and sedated    EYES:    sclera clear, conjunctiva normal    ENT:    Normocephalic, atraumatic,  External ears without lesions. Patient has OG tube and ET tube in place    NECK:    Supple, symmetrical, trachea midline,no JVD    HEMATOLOGIC/LYMPHATICS:    No cervical lymphadenopathy and no supraclavicular lymphadenopathy    LUNGS:    coarse decreased breath sounds to auscultation bilaterally, no wheezes, rhonchi, or rales,     CARDIOVASCULAR:    Normal apical impulse, regular rate and rhythm, normal S1 and S2, no S3 or S4, and no murmur noted    ABDOMEN:    , soft, non-distended, non-tender, morbidly obese    MUSCULOSKELETAL:    There is no redness, warmth, or swelling of the joints. NEUROLOGIC:    Awake, alert, oriented to name, place and time. Patient currently intubated and sedated    SKIN:    No bruising or bleeding. No redness, warmth, or swelling    EXTREMITIES:    Peripheral pulses present. No edema, cyanosis, or swelling.   Patient has bilateral lower extremity edema    LINES/CATHETERS   Patient has right IJ CVC  Whitaker catheter in place    LABORATORY DATA:  CBC with Differential:    Lab Results   Component Value Date/Time    WBC 6.8 02/15/2023 04:38 AM    RBC 4.19 02/15/2023 04:38 AM    HGB 12.8 02/15/2023 04:38 AM    HCT 39.5 02/15/2023 04:38 AM     02/15/2023 04:38 AM    MCV 94.3 02/15/2023 04:38 AM    MCH 30.5 02/15/2023 04:38 AM    MCHC 32.4 02/15/2023 04:38 AM    RDW 12.5 02/15/2023 04:38 AM    SEGSPCT 73 03/12/2014 08:10 AM    LYMPHOPCT 10.0 02/15/2023 04:38 AM    MONOPCT 10.6 02/15/2023 04:38 AM    EOSPCT 2 10/21/2010 10:25 AM    BASOPCT 0.6 02/15/2023 04:38 AM    MONOSABS 0.72 02/15/2023 04:38 AM    LYMPHSABS 0.68 02/15/2023 04:38 AM    EOSABS 0.39 02/15/2023 04:38 AM    BASOSABS 0.04 02/15/2023 04:38 AM     CMP:    Lab Results   Component Value Date/Time     02/15/2023 04:38 AM    K 4.4 02/15/2023 04:38 AM    K 4.1 02/08/2023 02:33 PM     02/15/2023 04:38 AM    CO2 31 02/15/2023 04:38 AM    BUN 17 02/15/2023 04:38 AM CREATININE 0.8 02/15/2023 04:38 AM    GFRAA >60 07/31/2019 09:44 AM    LABGLOM >60 02/15/2023 04:38 AM    GLUCOSE 126 02/15/2023 04:38 AM    GLUCOSE 99 05/30/2012 08:37 AM    PROT 6.1 02/15/2023 04:38 AM    LABALBU 3.0 02/15/2023 04:38 AM    LABALBU 4.2 05/30/2012 08:37 AM    CALCIUM 8.5 02/15/2023 04:38 AM    BILITOT 0.4 02/15/2023 04:38 AM    ALKPHOS 81 02/15/2023 04:38 AM    AST 20 02/15/2023 04:38 AM    ALT 12 02/15/2023 04:38 AM       ASSESSMENT/PLAN:  Acute hypoxemic and hypercapnic respiratory failure requiring mechanical ventilation  Exacerbation of COPD with current tobacco abuse  Encephalopathy rule out meningitis  Community-acquired pneumonia  History of multiple sclerosis since age 28  Hypertension  Hyperlipidemia  Obesity hypoventilation syndrome  History of alcohol and drug abuse  Morbid obesity with BMI 56.60 kg/m²  Hypothyroidism      Patient presented with altered mental status. Patient became increasingly more confused and agitated. Patient was intubated in the ED. There is concern for meningitis. However lumbar puncture was unable to be performed. Patient placed on acyclovir, meropenem, vancomycin. Patient is immunocompromise in the setting of [employer] for his MS. Cultures ordered. Consider ID consultation. IR has been consulted for lumbar puncture. Critical care consulted and patient accepted to ICU. Further evaluation and management per critical care. Patient is awaiting transfer to [facility] pending bed availability.    -Home medication reviewed  -N.p.o. with patient intubated  -Lactated Ringer's at 75 cc an hour  -DuoNeb aerosols 4 times daily  -Pulmicort aerosol twice daily  -NG tube feedings    -Patient extubated 2/11 PM    -IV Merrem  -IV vancomycin  -IV Levaquin  -Lovenox 60 mg SQ twice daily  -PT/OT    -Transferred to monitored bed When okay with pulmonology    -BMP, CBC in a.mFrances Finn DO  11:04 AM  2/15/2023

## 2023-02-15 NOTE — PROGRESS NOTES
Pulmonary/Critical Care Progress Note    We are following patient for acute hypoxemic and hypercapnic respiratory failure (improved), obstructive sleep apnea, COPD with exacerbation, nicotine abuse, history of alcohol and drug abuse, history of hypothyroidism, multiple sclerosis, suspected prior right lower lobe infiltrate which is improved, morbid obesity, alveolar hypoventilation syndrome    SUBJECTIVE:  Patient continues to improve and has been oxygenating much better, currently on a regular 6 L cannula with saturations between 90 to 96%. He has been taken off the heated high flow nasal cannula but will continue to wear BiPAP at night because of his morbid obesity and sleep apnea. He continues on meropenem levofloxacin and vancomycin for possible pneumonia/atelectasis, both community-acquired and aspiration. He is allergic to penicillin.     Patient says he has every intention of returning to smoking and his sedentary lifestyle at home once he leaves the hospital.    MEDICATIONS:   meropenem  1,000 mg IntraVENous Q8H    vancomycin  1,250 mg IntraVENous Q8H    [START ON 2/16/2023] levoFLOXacin  750 mg Oral Daily    pantoprazole  40 mg Oral QAM AC    gabapentin  600 mg Oral BID    nicotine  1 patch TransDERmal Daily    baclofen  10 mg Oral TID    sodium chloride flush  5-40 mL IntraVENous 2 times per day    levothyroxine  175 mcg Oral Daily    metoprolol tartrate  25 mg Oral BID    pramipexole  0.125 mg Oral TID    enoxaparin  60 mg SubCUTAneous BID    ipratropium-albuterol  1 ampule Inhalation Q4H    sodium chloride flush  5-40 mL IntraVENous 2 times per day    budesonide  0.5 mg Nebulization BID      dextrose      sodium chloride       HYDROcodone-acetaminophen, glucose, dextrose bolus **OR** dextrose bolus, glucagon (rDNA), dextrose, sodium chloride flush, polyethylene glycol, acetaminophen **OR** acetaminophen, sodium chloride flush, sodium chloride      REVIEW OF SYSTEMS:  Constitutional: Denies fever, weight loss, night sweats, and fatigue  Skin: Denies pigmentation, dark lesions, and rashes   HEENT: Denies hearing loss, tinnitus, ear drainage, epistaxis, sore throat, and hoarseness. Cardiovascular: Denies palpitations, chest pain, and chest pressure. Respiratory: Denies cough, dyspnea at rest, hemoptysis, apnea, and choking. Gastrointestinal: Denies nausea, vomiting, poor appetite, diarrhea, heartburn or reflux  Genitourinary: Denies dysuria, frequency, urgency or hematuria  Musculoskeletal: Denies myalgias, muscle weakness, and bone pain  Neurological: Denies dizziness, vertigo, headache, and focal weakness  Psychological: Denies anxiety and depression  Endocrine: Denies heat intolerance and cold intolerance  Hematopoietic/Lymphatic: Denies bleeding problems and blood transfusions    OBJECTIVE:  Vitals:    02/15/23 1000   BP: 133/86   Pulse: 75   Resp: 19   Temp:    SpO2: 92%     FiO2 : 40 %  O2 Flow Rate (L/min): 6 L/min  O2 Device: High flow nasal cannula    PHYSICAL EXAM:  Constitutional: Fever, chills, diaphoresis  Skin: Skin rash, no skin breakdown  HEENT: Mucous membranes are moist  Neck: Large neck circumference  Cardiovascular: 1, S2 regular. No S3 murmurs rubs present  Respiratory: Few inspiratory crackles right base, wheezing his improved a great deal  Gastrointestinal: Soft, obese, nontender  Genitourinary: No CVA tenderness. No bloody urine  Extremities: No clubbing, cyanosis, or edema  Neurological: Wake, alert, oriented x3. No evidence of focal motor or sensory deficits  Psychological: Appropriate affect.   Seems a bit depressed    LABS:  WBC   Date Value Ref Range Status   02/15/2023 6.8 4.5 - 11.5 E9/L Final   02/14/2023 6.6 4.5 - 11.5 E9/L Final   02/13/2023 7.3 4.5 - 11.5 E9/L Final     Hemoglobin   Date Value Ref Range Status   02/15/2023 12.8 12.5 - 16.5 g/dL Final   02/14/2023 12.6 12.5 - 16.5 g/dL Final   02/13/2023 12.7 12.5 - 16.5 g/dL Final     Hematocrit   Date Value Ref Range Status   02/15/2023 39.5 37.0 - 54.0 % Final   02/14/2023 38.0 37.0 - 54.0 % Final   02/13/2023 40.0 37.0 - 54.0 % Final     MCV   Date Value Ref Range Status   02/15/2023 94.3 80.0 - 99.9 fL Final   02/14/2023 95.2 80.0 - 99.9 fL Final   02/13/2023 96.2 80.0 - 99.9 fL Final     Platelets   Date Value Ref Range Status   02/15/2023 253 130 - 450 E9/L Final   02/14/2023 231 130 - 450 E9/L Final   02/13/2023 251 130 - 450 E9/L Final     Sodium   Date Value Ref Range Status   02/15/2023 141 132 - 146 mmol/L Final   02/14/2023 138 132 - 146 mmol/L Final   02/13/2023 142 132 - 146 mmol/L Final     Potassium   Date Value Ref Range Status   02/15/2023 4.4 3.5 - 5.0 mmol/L Final   02/14/2023 4.0 3.5 - 5.0 mmol/L Final   02/13/2023 4.1 3.5 - 5.0 mmol/L Final     Potassium reflex Magnesium   Date Value Ref Range Status   02/08/2023 4.1 3.5 - 5.0 mmol/L Final   12/10/2018 3.6 3.5 - 5.0 mmol/L Final     Chloride   Date Value Ref Range Status   02/15/2023 104 98 - 107 mmol/L Final   02/14/2023 103 98 - 107 mmol/L Final   02/13/2023 105 98 - 107 mmol/L Final     CO2   Date Value Ref Range Status   02/15/2023 31 (H) 22 - 29 mmol/L Final   02/14/2023 30 (H) 22 - 29 mmol/L Final   02/13/2023 29 22 - 29 mmol/L Final     BUN   Date Value Ref Range Status   02/15/2023 17 6 - 20 mg/dL Final   02/14/2023 20 6 - 20 mg/dL Final   02/13/2023 17 6 - 20 mg/dL Final     Creatinine   Date Value Ref Range Status   02/15/2023 0.8 0.7 - 1.2 mg/dL Final   02/14/2023 0.8 0.7 - 1.2 mg/dL Final   02/13/2023 0.8 0.7 - 1.2 mg/dL Final     Glucose   Date Value Ref Range Status   02/15/2023 126 (H) 74 - 99 mg/dL Final   02/14/2023 142 (H) 74 - 99 mg/dL Final   02/13/2023 135 (H) 74 - 99 mg/dL Final   05/30/2012 99 70 - 110 mg/dL Final   04/04/2012 89 70 - 110 mg/dL Final   01/13/2012 87 70 - 110 mg/dL Final     Calcium   Date Value Ref Range Status   02/15/2023 8.5 (L) 8.6 - 10.2 mg/dL Final   02/14/2023 8.8 8.6 - 10.2 mg/dL Final   02/13/2023 8.7 8.6 - 10.2 mg/dL Final     Total Protein   Date Value Ref Range Status   02/15/2023 6.1 (L) 6.4 - 8.3 g/dL Final   02/14/2023 6.0 (L) 6.4 - 8.3 g/dL Final   02/13/2023 6.2 (L) 6.4 - 8.3 g/dL Final     Albumin   Date Value Ref Range Status   02/15/2023 3.0 (L) 3.5 - 5.2 g/dL Final   02/14/2023 3.0 (L) 3.5 - 5.2 g/dL Final   02/13/2023 3.2 (L) 3.5 - 5.2 g/dL Final   05/30/2012 4.2 3.2 - 4.8 g/dL Final   04/04/2012 4.4 3.2 - 4.8 g/dL Final   12/08/2011 4.1 3.2 - 4.8 g/dL Final     Total Bilirubin   Date Value Ref Range Status   02/15/2023 0.4 0.0 - 1.2 mg/dL Final   02/14/2023 0.5 0.0 - 1.2 mg/dL Final   02/13/2023 0.6 0.0 - 1.2 mg/dL Final     Alkaline Phosphatase   Date Value Ref Range Status   02/15/2023 81 40 - 129 U/L Final   02/14/2023 79 40 - 129 U/L Final   02/13/2023 76 40 - 129 U/L Final     AST   Date Value Ref Range Status   02/15/2023 20 0 - 39 U/L Final     Comment:     Specimen is slightly Hemolyzed. Result may be artificially increased. 02/14/2023 10 0 - 39 U/L Final   02/13/2023 14 0 - 39 U/L Final     Comment:     Specimen is slightly Hemolyzed. Result may be artificially increased. ALT   Date Value Ref Range Status   02/15/2023 12 0 - 40 U/L Final   02/14/2023 12 0 - 40 U/L Final   02/13/2023 11 0 - 40 U/L Final     Est, Glom Filt Rate   Date Value Ref Range Status   02/15/2023 >60 >=60 mL/min/1.73 Final     Comment:     Pediatric calculator link  [email or web address]. [email or web address]_[email or web address]  Effective Oct 3, 2022  These results are not intended for use in patients  <25years of age. eGFR results are calculated without  a race factor using the 2021 CKD-EPI equation. Careful  clinical correlation is recommended, particularly when  comparing to results calculated using previous equations.   The CKD-EPI equation is less accurate in patients with  extremes of muscle mass, extra-renal metabolism of  creatinine, excessive creatinine ingestion, or following  therapy that affects renal tubular secretion. 02/14/2023 >60 >=60 mL/min/1.73 Final     Comment:     Pediatric calculator link  [facility].at. [email or web address]/gfr_calculatorped  Effective Oct 3, 2022  These results are not intended for use in patients  <25years of age. eGFR results are calculated without  a race factor using the 2021 CKD-EPI equation. Careful  clinical correlation is recommended, particularly when  comparing to results calculated using previous equations. The CKD-EPI equation is less accurate in patients with  extremes of muscle mass, extra-renal metabolism of  creatinine, excessive creatinine ingestion, or following  therapy that affects renal tubular secretion. 02/13/2023 >60 >=60 mL/min/1.73 Final     Comment:     Pediatric calculator link  [facility].at. [email or web address]/gfr_calculatorped  Effective Oct 3, 2022  These results are not intended for use in patients  <25years of age. eGFR results are calculated without  a race factor using the 2021 CKD-EPI equation. Careful  clinical correlation is recommended, particularly when  comparing to results calculated using previous equations. The CKD-EPI equation is less accurate in patients with  extremes of muscle mass, extra-renal metabolism of  creatinine, excessive creatinine ingestion, or following  therapy that affects renal tubular secretion.        GFR    Date Value Ref Range Status   07/31/2019 >60  Final   04/04/2019 >60  Final   02/07/2019 >60  Final     Magnesium   Date Value Ref Range Status   02/15/2023 1.9 1.6 - 2.6 mg/dL Final   02/14/2023 1.9 1.6 - 2.6 mg/dL Final   02/13/2023 1.9 1.6 - 2.6 mg/dL Final     Phosphorus   Date Value Ref Range Status   02/15/2023 3.0 2.5 - 4.5 mg/dL Final   02/14/2023 3.4 2.5 - 4.5 mg/dL Final   02/13/2023 2.8 2.5 - 4.5 mg/dL Final     Recent Labs     02/13/23  0432   PH 7.433   PO2 86.2   PCO2 46.2*   HCO3 30.2*   BE 5.1*   O2SAT 96.0       RADIOLOGY:  XR CHEST PORTABLE   Final Result   Bilateral ground-glass infiltrates consistent with atelectasis, pulmonary   edema and/or pneumonitis. These are not significantly changed given the   differences in technique. Small bilateral pleural effusions right more than left, improved. Stable enlargement of the cardiac silhouette. XR CHEST PORTABLE   Final Result   Cardiomegaly, mild pulmonary venous hypertension and minimal perihilar hazy   opacity. XR CHEST PORTABLE   Final Result   Persistent subsegmental atelectasis in the right base with discrete increased   density in the right parahilar region. XR CHEST PORTABLE   Final Result   Unchanged atelectasis and or infiltrate suggested at the right base. Cardiomegaly. US DUP LOWER EXTREMITIES BILATERAL VENOUS   Final Result   No evidence of DVT in either lower extremity. XR CHEST PORTABLE   Final Result   Suboptimal inspiration with likely atelectasis at the right base. Indeterminate position of the nasogastric tube tip. Consider an abdominal   radiograph for this purpose. CT ABDOMEN PELVIS W IV CONTRAST Additional Contrast? None   Final Result   Grossly normal CT scan abdomen and pelvis. CT CHEST W CONTRAST   Final Result   Right lung atelectasis, of otherwise unremarkable CT scan chest.         XR ABDOMEN FOR NG/OG/NE TUBE PLACEMENT   Final Result   Nasogastric tube is poorly discerned on the abdominal films but CT chest   confirms that tip of this device terminates at the gastroesophageal junction. Preliminary report is provided via ancillary staff to a license caregiver on   02/08/2023 at 9:14 p.m. EST. XR CHEST PORTABLE   Final Result   1. Medical support devices as above. The enteric tube on this exam appears   to slightly retracted with distal tip projecting over the central mediastinum.       (Suggest confirmation with dedicated radiographic evaluation of the lower   chest and upper abdomen and if confirmed, suggest repositioning.)      2. Atherosclerotic disease and prominence of the cardiac silhouette. There   are bilateral interstitial opacities which are unchanged. The findings were sent to the Radiology Results [address] at 6:55   pm on 2/8/2023 to be communicated to a licensed caregiver. XR CHEST PORTABLE   Final Result   ETT as noted above. CT HEAD WO CONTRAST   Final Result   No acute intracranial abnormality. XR CHEST PORTABLE   Final Result   No acute process. Mild cardiomegaly. PROBLEM LIST:  Principal Problem:    Acute encephalopathy  Resolved Problems:    * No resolved hospital problems. *      IMPRESSION:  Acute hypoxemic and hypercapnic respiratory failure  Morbid obesity  Obstructive sleep apnea  Right lower lobe pneumonia, improving  COPD with exacerbation secondary to heavy and longtime cigarette smoking  History of illicit drug use  Nicotine addiction  Alcohol abuse  Multiple sclerosis  Probable obstructive sleep apnea    PLAN:  Continue antibiotics for approximately 2 more days  BiPAP nightly  Agree with changing Airvo to simple nasal cannula since he is improving  Can DC IV fluids  Continue Lovenox  Continue aerosolized bronchodilators and inhaled steroids  Continue to try to wean off Precedex  Continue anticoagulation  Continue PT and OT  X-ray in a.m. ATTESTATION:  ICU Staff Physician note of personal involvement in Care  As the attending physician, I certify that I personally reviewed the patients history and personally examined the patient to confirm the physical findings described above,  And that I reviewed the relevant imaging studies and available reports. I also discussed the differential diagnosis and all of the proposed management plans with the patient and individuals accompanying the patient to this visit.   They had the opportunity to ask questions about the proposed management plans and to have those questions answered. This patient has a high probability of sudden, clinically significant deterioration, which requires the highest level of physician preparedness to intervene urgently. I managed/supervised life or organ supporting interventions that required frequent physician assessment. I devoted my full attention to the direct care of this patient for the amount of time indicated below. Time I spent with the family or surrogate(s) is included only if the patient was incapable of providing the necessary information or participating in medical decisions - Time devoted to teaching and to any procedures I billed separately is not included.     CRITICAL CARE TIME:  39 minutes    Electronically signed by Chen Patel MD on 2/15/2023 at 3:38 PM

## 2023-02-15 NOTE — PROGRESS NOTES
Pharmacy Consultation Note  (Antibiotic Dosing and Monitoring)    Initial consult date: 02/08/2023  Consulting physician/provider: Rice  Drug: Vancomycin  Indication: Central Nervous System Infection     Age/  Gender Height Weight IBW  Allergy Information   57 y.o./male 5' 10\" (177.8 cm) (!) 380 lb (172.4 kg)     Ideal body weight: 73 kg (160 lb 15 oz)  Adjusted ideal body weight: 119.2 kg (262 lb 14.1 oz)   Bactrim [sulfamethoxazole-trimethoprim], Penicillins, and Sulfa antibiotics      Renal Function:  Recent Labs     02/13/23  0428 02/14/23  0411 02/15/23  0438   BUN 17 20 17   CREATININE 0.8 0.8 0.8         Intake/Output Summary (Last 24 hours) at 2/15/2023 1430  Last data filed at 2/15/2023 1343  Gross per 24 hour   Intake 1341.97 ml   Output 1850 ml   Net -508.03 ml       Vancomycin Monitoring:  Trough:    Recent Labs     02/14/23  1540   VANCOTROUGH 18.7*       Random:  No results for input(s): VANCORANDOM in the last 72 hours. No results for input(s): Vicenta Dage in the last 72 hours. Historical Cultures:  No results found for: ORG  No results for input(s): BC in the last 72 hours. Recent vancomycin administrations                     vancomycin (VANCOCIN) 1,250 mg in sodium chloride 0.9 % 250 mL IVPB (mg) 1,250 mg New Bag 02/15/23 0826     1,250 mg New Bag  0021     1,250 mg New Bag 02/14/23 1642     1,250 mg New Bag  0838     1,250 mg New Bag  0026     1,250 mg New Bag 02/13/23 1611     1,250 mg New Bag  0818     1,250 mg New Bag  0005     1,250 mg New Bag 02/12/23 1814             Assessment:  Patient is a 65 y.o. male who has been initiated on vancomycin  Estimated Creatinine Clearance: 174 mL/min (based on SCr of 0.8 mg/dL). To dose vancomycin, pharmacy will be utilizing [employer] calculation software for goal AUC/BEAU 400-600 mg/L-hr  2/10: Random AM level = 8.6 mcg/mL. AUC/BEAU 290 mg/L.hr. Est true trough of 4.5 mcg/mL.   2/11: Trough @ 0757 = 14.2 mcg/mL, AUC/BEAU 459 mg/L-hr  2/14: Trough @ 1540 = 18.7 mcg/mL, AUC/BEAU 513. Plan:  Continue Vancomycin 1250 mg IV q8hr  10 days of therapy per Dr. Bryan File. Stop date entered.   Will continue to monitor renal function   Pharmacy to follow    Lalo Kaufman PharmD, BCPS 2/15/2023 2:30 PM   Ext: [phone]

## 2023-02-15 NOTE — CARE COORDINATION
2/15/23 ICU, vapotherm, precedex gtt. Pt with Multiple Sclerosis. [place] [employer] coverage difficult for [facility]- approvals (gave referral to Eusebio Krueger to follow). May need SNF stay prior to discharge to home. If oxygen needed at home at discharge- they would like [facility]. . Daughter reviewing SNF lists. PRECERT NEEDED, STEVEN and HENS if SNF at discharge. CM following.  Electronically signed by VINITA Mckeon on 2/15/2023 at 12:29 PM

## 2023-02-16 ENCOUNTER — APPOINTMENT (OUTPATIENT)
Dept: GENERAL RADIOLOGY | Age: 58
DRG: 208 | End: 2023-02-16
Payer: MEDICARE

## 2023-02-16 LAB
ALBUMIN SERPL-MCNC: 3.1 G/DL (ref 3.5–5.2)
ALP BLD-CCNC: 84 U/L (ref 40–129)
ALT SERPL-CCNC: 14 U/L (ref 0–40)
ANION GAP SERPL CALCULATED.3IONS-SCNC: 6 MMOL/L (ref 7–16)
AST SERPL-CCNC: 13 U/L (ref 0–39)
BASOPHILS ABSOLUTE: 0.04 E9/L (ref 0–0.2)
BASOPHILS RELATIVE PERCENT: 0.6 % (ref 0–2)
BILIRUB SERPL-MCNC: 0.3 MG/DL (ref 0–1.2)
BUN BLDV-MCNC: 13 MG/DL (ref 6–20)
CALCIUM SERPL-MCNC: 8.5 MG/DL (ref 8.6–10.2)
CHLORIDE BLD-SCNC: 102 MMOL/L (ref 98–107)
CO2: 30 MMOL/L (ref 22–29)
CREAT SERPL-MCNC: 0.8 MG/DL (ref 0.7–1.2)
EOSINOPHILS ABSOLUTE: 0.39 E9/L (ref 0.05–0.5)
EOSINOPHILS RELATIVE PERCENT: 6 % (ref 0–6)
GFR SERPL CREATININE-BSD FRML MDRD: >60 ML/MIN/1.73
GLUCOSE BLD-MCNC: 149 MG/DL (ref 74–99)
HCT VFR BLD CALC: 38.7 % (ref 37–54)
HEMOGLOBIN: 12.7 G/DL (ref 12.5–16.5)
IMMATURE GRANULOCYTES #: 0.02 E9/L
IMMATURE GRANULOCYTES %: 0.3 % (ref 0–5)
LYMPHOCYTES ABSOLUTE: 0.67 E9/L (ref 1.5–4)
LYMPHOCYTES RELATIVE PERCENT: 10.3 % (ref 20–42)
MAGNESIUM: 2 MG/DL (ref 1.6–2.6)
MCH RBC QN AUTO: 31.4 PG (ref 26–35)
MCHC RBC AUTO-ENTMCNC: 32.8 % (ref 32–34.5)
MCV RBC AUTO: 95.8 FL (ref 80–99.9)
METER GLUCOSE: 152 MG/DL (ref 74–99)
MONOCYTES ABSOLUTE: 0.69 E9/L (ref 0.1–0.95)
MONOCYTES RELATIVE PERCENT: 10.6 % (ref 2–12)
NEUTROPHILS ABSOLUTE: 4.68 E9/L (ref 1.8–7.3)
NEUTROPHILS RELATIVE PERCENT: 72.2 % (ref 43–80)
PDW BLD-RTO: 12.9 FL (ref 11.5–15)
PHOSPHORUS: 2.6 MG/DL (ref 2.5–4.5)
PLATELET # BLD: 240 E9/L (ref 130–450)
PMV BLD AUTO: 10.5 FL (ref 7–12)
POTASSIUM SERPL-SCNC: 3.8 MMOL/L (ref 3.5–5)
RBC # BLD: 4.04 E12/L (ref 3.8–5.8)
SODIUM BLD-SCNC: 138 MMOL/L (ref 132–146)
TOTAL PROTEIN: 5.9 G/DL (ref 6.4–8.3)
WBC # BLD: 6.5 E9/L (ref 4.5–11.5)

## 2023-02-16 PROCEDURE — 2580000003 HC RX 258: Performed by: INTERNAL MEDICINE

## 2023-02-16 PROCEDURE — 83735 ASSAY OF MAGNESIUM: CPT

## 2023-02-16 PROCEDURE — 6360000002 HC RX W HCPCS: Performed by: INTERNAL MEDICINE

## 2023-02-16 PROCEDURE — 6370000000 HC RX 637 (ALT 250 FOR IP): Performed by: INTERNAL MEDICINE

## 2023-02-16 PROCEDURE — 80053 COMPREHEN METABOLIC PANEL: CPT

## 2023-02-16 PROCEDURE — 82962 GLUCOSE BLOOD TEST: CPT

## 2023-02-16 PROCEDURE — 36592 COLLECT BLOOD FROM PICC: CPT

## 2023-02-16 PROCEDURE — 92526 ORAL FUNCTION THERAPY: CPT | Performed by: SPEECH-LANGUAGE PATHOLOGIST

## 2023-02-16 PROCEDURE — 2700000000 HC OXYGEN THERAPY PER DAY

## 2023-02-16 PROCEDURE — 94669 MECHANICAL CHEST WALL OSCILL: CPT

## 2023-02-16 PROCEDURE — 2580000003 HC RX 258: Performed by: STUDENT IN AN ORGANIZED HEALTH CARE EDUCATION/TRAINING PROGRAM

## 2023-02-16 PROCEDURE — 6370000000 HC RX 637 (ALT 250 FOR IP)

## 2023-02-16 PROCEDURE — 71045 X-RAY EXAM CHEST 1 VIEW: CPT

## 2023-02-16 PROCEDURE — 94660 CPAP INITIATION&MGMT: CPT

## 2023-02-16 PROCEDURE — 94640 AIRWAY INHALATION TREATMENT: CPT

## 2023-02-16 PROCEDURE — 97110 THERAPEUTIC EXERCISES: CPT | Performed by: PHYSICAL THERAPIST

## 2023-02-16 PROCEDURE — 1200000000 HC SEMI PRIVATE

## 2023-02-16 PROCEDURE — 84100 ASSAY OF PHOSPHORUS: CPT

## 2023-02-16 PROCEDURE — 97530 THERAPEUTIC ACTIVITIES: CPT | Performed by: PHYSICAL THERAPIST

## 2023-02-16 PROCEDURE — 85025 COMPLETE CBC W/AUTO DIFF WBC: CPT

## 2023-02-16 RX ADMIN — MEROPENEM 1000 MG: 1 INJECTION, POWDER, FOR SOLUTION INTRAVENOUS at 22:09

## 2023-02-16 RX ADMIN — HYDROCODONE BITARTRATE AND ACETAMINOPHEN 1 TABLET: 10; 325 TABLET ORAL at 05:41

## 2023-02-16 RX ADMIN — IPRATROPIUM BROMIDE AND ALBUTEROL SULFATE 1 AMPULE: .5; 2.5 SOLUTION RESPIRATORY (INHALATION) at 03:01

## 2023-02-16 RX ADMIN — Medication 10 ML: at 08:19

## 2023-02-16 RX ADMIN — IPRATROPIUM BROMIDE AND ALBUTEROL SULFATE 1 AMPULE: .5; 2.5 SOLUTION RESPIRATORY (INHALATION) at 17:29

## 2023-02-16 RX ADMIN — VANCOMYCIN HYDROCHLORIDE 1250 MG: 10 INJECTION, POWDER, LYOPHILIZED, FOR SOLUTION INTRAVENOUS at 16:55

## 2023-02-16 RX ADMIN — METOPROLOL TARTRATE 25 MG: 25 TABLET, FILM COATED ORAL at 08:16

## 2023-02-16 RX ADMIN — ENOXAPARIN SODIUM 60 MG: 100 INJECTION SUBCUTANEOUS at 08:17

## 2023-02-16 RX ADMIN — HYDROCODONE BITARTRATE AND ACETAMINOPHEN 1 TABLET: 10; 325 TABLET ORAL at 15:48

## 2023-02-16 RX ADMIN — IPRATROPIUM BROMIDE AND ALBUTEROL SULFATE 1 AMPULE: .5; 2.5 SOLUTION RESPIRATORY (INHALATION) at 05:44

## 2023-02-16 RX ADMIN — GABAPENTIN 600 MG: 300 CAPSULE ORAL at 22:16

## 2023-02-16 RX ADMIN — HYDROCODONE BITARTRATE AND ACETAMINOPHEN 1 TABLET: 10; 325 TABLET ORAL at 22:15

## 2023-02-16 RX ADMIN — BACLOFEN 10 MG: 10 TABLET ORAL at 08:18

## 2023-02-16 RX ADMIN — ENOXAPARIN SODIUM 60 MG: 100 INJECTION SUBCUTANEOUS at 22:08

## 2023-02-16 RX ADMIN — POLYETHYLENE GLYCOL 3350 17 G: 17 POWDER, FOR SOLUTION ORAL at 10:40

## 2023-02-16 RX ADMIN — BUDESONIDE 500 MCG: 0.5 SUSPENSION RESPIRATORY (INHALATION) at 05:44

## 2023-02-16 RX ADMIN — LEVOTHYROXINE SODIUM 175 MCG: 0.17 TABLET ORAL at 05:41

## 2023-02-16 RX ADMIN — MEROPENEM 1000 MG: 1 INJECTION, POWDER, FOR SOLUTION INTRAVENOUS at 11:47

## 2023-02-16 RX ADMIN — LEVOFLOXACIN 750 MG: 750 TABLET, FILM COATED ORAL at 08:16

## 2023-02-16 RX ADMIN — PRAMIPEXOLE DIHYDROCHLORIDE 0.12 MG: 0.12 TABLET ORAL at 08:17

## 2023-02-16 RX ADMIN — PRAMIPEXOLE DIHYDROCHLORIDE 0.12 MG: 0.12 TABLET ORAL at 13:51

## 2023-02-16 RX ADMIN — BACLOFEN 10 MG: 10 TABLET ORAL at 22:08

## 2023-02-16 RX ADMIN — BUDESONIDE 500 MCG: 0.5 SUSPENSION RESPIRATORY (INHALATION) at 17:29

## 2023-02-16 RX ADMIN — METOPROLOL TARTRATE 25 MG: 25 TABLET, FILM COATED ORAL at 22:07

## 2023-02-16 RX ADMIN — PANTOPRAZOLE SODIUM 40 MG: 40 TABLET, DELAYED RELEASE ORAL at 05:41

## 2023-02-16 RX ADMIN — IPRATROPIUM BROMIDE AND ALBUTEROL SULFATE 1 AMPULE: .5; 2.5 SOLUTION RESPIRATORY (INHALATION) at 09:55

## 2023-02-16 RX ADMIN — VANCOMYCIN HYDROCHLORIDE 1250 MG: 10 INJECTION, POWDER, LYOPHILIZED, FOR SOLUTION INTRAVENOUS at 00:09

## 2023-02-16 RX ADMIN — VANCOMYCIN HYDROCHLORIDE 1250 MG: 10 INJECTION, POWDER, LYOPHILIZED, FOR SOLUTION INTRAVENOUS at 08:19

## 2023-02-16 RX ADMIN — MEROPENEM 1000 MG: 1 INJECTION, POWDER, FOR SOLUTION INTRAVENOUS at 03:14

## 2023-02-16 RX ADMIN — IPRATROPIUM BROMIDE AND ALBUTEROL SULFATE 1 AMPULE: .5; 2.5 SOLUTION RESPIRATORY (INHALATION) at 13:38

## 2023-02-16 RX ADMIN — BACLOFEN 10 MG: 10 TABLET ORAL at 13:51

## 2023-02-16 RX ADMIN — PRAMIPEXOLE DIHYDROCHLORIDE 0.12 MG: 0.12 TABLET ORAL at 23:00

## 2023-02-16 RX ADMIN — GABAPENTIN 600 MG: 300 CAPSULE ORAL at 08:16

## 2023-02-16 ASSESSMENT — PAIN DESCRIPTION - ORIENTATION: ORIENTATION: LOWER;RIGHT;LEFT

## 2023-02-16 ASSESSMENT — PAIN SCALES - GENERAL
PAINLEVEL_OUTOF10: 0
PAINLEVEL_OUTOF10: 8
PAINLEVEL_OUTOF10: 0
PAINLEVEL_OUTOF10: 0
PAINLEVEL_OUTOF10: 7
PAINLEVEL_OUTOF10: 0
PAINLEVEL_OUTOF10: 0

## 2023-02-16 ASSESSMENT — PAIN - FUNCTIONAL ASSESSMENT: PAIN_FUNCTIONAL_ASSESSMENT: PREVENTS OR INTERFERES SOME ACTIVE ACTIVITIES AND ADLS

## 2023-02-16 ASSESSMENT — PAIN DESCRIPTION - LOCATION: LOCATION: BACK;KNEE;LEG

## 2023-02-16 ASSESSMENT — PAIN DESCRIPTION - DESCRIPTORS: DESCRIPTORS: ACHING

## 2023-02-16 NOTE — CARE COORDINATION
2/16/23 Transfer to Telemetry planned 2/16/23, room air (new), and bipap ordered for HS and naps. Mask size Large. Pt with Multiple Sclerosis. [place] [employer] coverage difficult for [facility]- approvals (gave referral to Earl Spicer to follow). CM met with pt and daughter Deena Webb. Plans for SNF- [facility] and [place] [facility] alternate Referral to [facility] to review for acceptance. [facility] coverage- fast to give auths. If HHC needed- family requested [place] [facility] and alternate [address] If oxygen needed at home at discharge- they would like [facility]. CM placed call to Leandro Alejandra with [facility] to evaluate pts right arm- two areas on concern per Deena Webb the daughter. Pt has a elia in that left arm. Daughter also mentioned a new bruise on the left upper arm. CM/SS assigned to follow. Electronically signed by VINITA Zamudio on 2/16/2023 at 10:43 AM      Addendum:   [facility] can not accept with pts MS medication [facility]- too costly and can not bring from home to that facility. [facility] to review- they can bring from home the MS medication at their facility. Waiting on [facility] to review. Electronically signed by VINITA Zamudio on 2/16/2023 at 11:26 AM      Addendum: Chela Mclean with [facility]- will revisit in AM- however, she thinks they can accept. Will start the precert (generally fast auths) when requested. HENS- initiated.  Electronically signed by Tomas Carbone RN on 2/16/2023 at 3:17 PM

## 2023-02-16 NOTE — PROGRESS NOTES
Pharmacy Consultation Note  (Antibiotic Dosing and Monitoring)    Initial consult date: 02/08/2023  Consulting physician/provider: Rice  Drug: Vancomycin  Indication: Central Nervous System Infection     Age/  Gender Height Weight IBW  Allergy Information   57 y.o./male 5' 10\" (177.8 cm) (!) 380 lb (172.4 kg)     Ideal body weight: 73 kg (160 lb 15 oz)  Adjusted ideal body weight: 116.6 kg (257 lb 1.4 oz)   Bactrim [sulfamethoxazole-trimethoprim], Penicillins, and Sulfa antibiotics      Renal Function:  Recent Labs     02/14/23  0411 02/15/23  0438 02/16/23  0425   BUN 20 17 13   CREATININE 0.8 0.8 0.8         Intake/Output Summary (Last 24 hours) at 2/16/2023 1100  Last data filed at 2/16/2023 0349  Gross per 24 hour   Intake 1451.97 ml   Output 2700 ml   Net -1248.03 ml       Vancomycin Monitoring:  Trough:    Recent Labs     02/14/23  1540   VANCOTROUGH 18.7*       Random:  No results for input(s): VANCORANDOM in the last 72 hours. No results for input(s): Vicenta Dage in the last 72 hours. Historical Cultures:  No results found for: ORG  No results for input(s): BC in the last 72 hours. Recent vancomycin administrations                     vancomycin (VANCOCIN) 1,250 mg in sodium chloride 0.9 % 250 mL IVPB (mg) 1,250 mg New Bag 02/16/23 0819     1,250 mg New Bag  0009     1,250 mg New Bag 02/15/23 1714    vancomycin (VANCOCIN) 1,250 mg in sodium chloride 0.9 % 250 mL IVPB (mg) 1,250 mg New Bag 02/15/23 0826     1,250 mg New Bag  0021     1,250 mg New Bag 02/14/23 1642     1,250 mg New Bag  0838     1,250 mg New Bag  0026     1,250 mg New Bag 02/13/23 1611             Assessment:  Patient is a 65 y.o. male who has been initiated on vancomycin  Estimated Creatinine Clearance: 170 mL/min (based on SCr of 0.8 mg/dL). To dose vancomycin, pharmacy will be utilizing [employer] calculation software for goal AUC/BEAU 400-600 mg/L-hr  2/10: Random AM level = 8.6 mcg/mL.  AUC/BEAU 290 mg/L.hr. Est true trough of 4.5 mcg/mL. 2/11: Trough @ 0757 = 14.2 mcg/mL, AUC/BEAU 459 mg/L-hr  2/14: Trough @ 1540 = 18.7 mcg/mL, AUC/BEAU 513. Plan:  Continue Vancomycin 1250 mg IV q8hr  10 days of therapy per Dr. Ester Marsh. Stop date entered.   Will continue to monitor renal function   Pharmacy to follow    Ana Martinez PharmD, BCPS 2/16/2023 11:00 AM   Ext: [phone]

## 2023-02-16 NOTE — PROGRESS NOTES
Department of Internal Medicine  PN    PCP: Apple Trinidad DO  Admitting Physician: Dr. Esme Mon  Consultants:   Date of Service: 2/8/2023    CHIEF COMPLAINT: Altered mental status    HISTORY OF PRESENT ILLNESS:    Patient is 27-year-old male who presented to the ED with altered mental status. HPI obtained from staff and chart review. Patient lives in the basement apparently. Mother stated that she has a room overhead where her son stays. States that she heard her son talking nonsensically and repeating phrases. She confronted him and tried to assist him. Later in the day patient went to the bathroom however did not come out and mother became concerned and called EMS. .  On presentation patient was able to answer questions although confused. patient was intubated due to protection of airway and need for further evaluation in the setting of agitation. 2/9/2023  Patient seen examined on ICU. Patient's family members at the bedside and case discussed. Case discussed with patient's nurse at the bedside. BUN/creatinine 16/1.0 with fasting blood sugar 195. Procalcitonin was 0.04. Drug screen positive for opiates with the patient on Percocet at home. WBC 16.7 hemoglobin 13.6. Urinalysis is unremarkable. Temperature 99 with heart rate 71 blood pressure 132/87. O2 sat 97% with the patient on assist control ventilator with a rate of 20 and 8 of PEEP and FiO2 55%. Urinary output is adequate. 2/10/2023  Patient seen examined in ICU. Patient still intubated and sedated. Patient currently with NG tube feedings. BUN/creatinine 15/1.1 with liver enzymes normal.  WBC is 9.8 with hemoglobin 12.3. Viral respiratory panel was negative. Temperature is 100 degrees with heart rate of 69 blood pressure 134/83. O2 sat 98% with the patient on assist control ventilator with a rate of 20 and 8 of PEEP with FiO2 45%. Urine output about 550 cc a shift.   Chest x-ray this morning shows unchanged atelectasis and/or infiltrate in the right base. 2/11/2023  Patient seen and examined on ICU. Case discussed with patient's nurse at the bedside. BUN/creatinine was 10/0.9 with normal electrolytes. Transaminases normal with WBC with fasting blood sugar 178. CBC is normal.  Temperature 99.7 with heart rate 76 and blood pressure 151/90. O2 sat 97% with patient assist control ventilator with rate of 20 and 8 of PEEP and FiO2 40%. Urinary output is good. Case discussed with intensivist today. We will attempt weaning off ventilator today. 2/12/2023  Patient seen examined on ICU. Patient was extubated yesterday afternoon. Patient is alert oriented very very weak and still very short of breath with any activity. Case discussed with patient nurse at the bedside. BUN/creatinine 11/0.8 with normal electrolytes. Liver enzymes are normal with WBC 7.8 hemoglobin 12.7. Temperature 99.9 with heart rate 73 and blood pressure 137/86. O2 sat 95% with the patient on BiPAP with FiO2 50%. 2/13/2023  Patient seen and examined on ICU. Patient is alert and oriented to person place. The patient is very weak and has not been out of bed yet. Patient stated he tolerated eating pudding this morning without problems. BUN/creatinine 17/0.8 with normal electrolytes. Liver enzymes are normal with a WBC of 7.3 and hemoglobin 12.7. Temperature is 100.4 with heart rate of 71 blood pressure 158/90. O2 sat 95% with the patient on heated high flow nasal cannula with FiO2 55%. Urine output ranges 350-500 cc a shift. Increase activity, consult PT/OT. 2/14/2023  Patient seen examined in the ICU. Patient's family is at the bedside and case discussed. The patient is alert and oriented x3. The patient though still very weak and tired. Patient says he got up yesterday but again is very weak and short of breath with any activity. Patient tolerating alternation of BiPAP and heated high flow nasal cannula. BUN/creatinine 20/0.8 with. Transaminases normal with fasting blood sugar 142. WBC 6.6 with hemoglobin 12.6. Temperature is 99.3 with heart rate of 67 blood pressure ranges 95//95. O2 sat is 93% with the patient on heated high flow nasal cannula with FiO2 55%. Patient currently on BiPAP every 4 hours and at at bedtime alternating with heated high flow nasal cannula. Urinary output is good. 2/15/2023  Patient seen examined on ICU. Patient's wife and daughters at the bedside and case discussed. The patient is awake and alert and oriented x3. Patient's oral intake is poor-fair. Patient still very very weak. BUN/creatinine 17/0.8 with normal electrolytes. Liver enzymes normal with normal transaminase. WBC 6.8 hemoglobin 12.8. Temperature 99.5 with heart rate 75 blood pressure 133/86. O2 sat 92% on-96% on heated high flow nasal cannula with FiO2 40%. Pulmonology, speech therapy note reviewed. 2/16/2023  Patient seen examined in the ICU. Patient is feeling a lot better today. Patient currently on room air is O2 sat at rest was 93%. Patient denies any current chest or abdominal pain. Patient though still very very weak. BUN/creatinine 13/0.8 with normal electrolytes. Fasting blood sugar 149. Liver enzymes normal with WBC 6.5 and hemoglobin 12.7. Temperature is 99 with heart rate of 88 and blood pressure 135/88. O2 sat 93-96% on 6 L nasal cannula. Urine output is good ranging 900-1800 cc a shift. Pulmonology note reviewed. Patient's appetite is poor-fair. Patient most likely be transferred to telemetry floor today.     PAST MEDICAL Hx:  Past Medical History:   Diagnosis Date    Dermatophytosis 1/12/2023    Hyperlipidemia     Hypertension     Leg pain     Leg swelling 1/12/2023    Lymphedema     Lymphedema of both lower extremities 1/12/2023    MS (multiple sclerosis) ([address]     Multiple sclerosis ([address]     Multiple sclerosis ([address] 1/12/2023    With significant weakness of both legs follows [facility] with [facility] Spinal stenosis, lumbar     Thyroid disease     Tinea pedis of both feet 1/12/2023       PAST SURGICAL Hx:   Past Surgical History:   Procedure Laterality Date    HERNIA REPAIR      TONSILLECTOMY         FAMILY Hx:  No family history on file. HOME MEDICATIONS:  Prior to Admission medications    Medication Sig Start Date End Date Taking? Authorizing Provider   levothyroxine (SYNTHROID) 175 MCG tablet Take 175 mcg by mouth every morning (before breakfast) 8/27/18  Yes Historical Provider, MD   metFORMIN (GLUCOPHAGE) 500 MG tablet Take 500 mg by mouth 2 times daily 3/4/22  Yes Historical Provider, MD   Monomethyl Fumarate (BAFIERTAM) 95 MG CPDR Take 190 mg by mouth 2 times daily 1/24/23 1/24/24 Yes Historical Provider, MD   ADVAIR -21 MCG/ACT inhaler Inhale 2 puffs into the lungs in the morning and at bedtime 2/8/23   Historical Provider, MD   gabapentin (NEURONTIN) 600 MG tablet Take 1 tablet by mouth in the morning, at noon, and at bedtime. 12/26/22   Historical Provider, MD   HYDROcodone-acetaminophen (NORCO)  MG per tablet Take 1 tablet by mouth every 6 hours as needed.  2/6/23   Historical Provider, MD   torsemide (DEMADEX) 20 MG tablet Take 1 tablet by mouth daily 12/31/22   Historical Provider, MD   valsartan (DIOVAN) 160 MG tablet Take 1 tablet by mouth daily 12/5/22   Historical Provider, MD   miconazole nitrate 2 % OINT Apply topically 2 times daily Apply to the toes in between the toes both feet and both calfs up to the knee twice a day for 1 month 1/19/23   Stefanie Santos MD   spironolactone (ALDACTONE) 25 MG tablet Take 25 mg by mouth daily    Historical Provider, MD   pramipexole (MIRAPEX) 0.125 MG tablet Take 0.125 mg by mouth 3 times daily    Historical Provider, MD   baclofen (LIORESAL) 20 MG tablet Take 20 mg by mouth 3 times daily    Historical Provider, MD   metoprolol tartrate (LOPRESSOR) 25 MG tablet Take 25 mg by mouth 2 times daily    Historical Provider, MD   vitamin E 1000 UNITS capsule Take 1,000 Units by mouth daily. Historical Provider, MD   loratadine (CLARITIN) 10 MG tablet Take 10 mg by mouth daily. Historical Provider, MD       ALLERGIES:  Bactrim [sulfamethoxazole-trimethoprim], Penicillins, and Sulfa antibiotics    SOCIAL Hx:  Social History     Socioeconomic History    Marital status:      Spouse name: Not on file    Number of children: Not on file    Years of education: Not on file    Highest education level: Not on file   Occupational History    Not on file   Tobacco Use    Smoking status: Every Day     Packs/day: 1.00     Types: Cigarettes    Smokeless tobacco: Never   Substance and Sexual Activity    Alcohol use: Yes     Comment: rarely    Drug use: Not Currently     Types: Marijuana [place])     Comment: edible    Sexual activity: Not on file   Other Topics Concern    Not on file   Social History Narrative    ** Merged History Encounter **          Social Determinants of Health     Financial Resource Strain: Not on file   Food Insecurity: Not on file   Transportation Needs: Not on file   Physical Activity: Not on file   Stress: Not on file   Social Connections: Not on file   Intimate Partner Violence: Not on file   Housing Stability: Not on file       ROS: Positive in bold  General:   Denies chills, fatigue, fever, malaise, night sweats or weight loss    Psychological:   Denies anxiety, disorientation or hallucinations    ENT:    Denies epistaxis, headaches, vertigo or visual changes    Cardiovascular:   Denies any chest pain, irregular heartbeats, or palpitations. No paroxysmal nocturnal dyspnea. Respiratory:   Denies shortness of breath, coughing, sputum production, hemoptysis, or wheezing. No orthopnea. Gastrointestinal:   Denies nausea, vomiting, diarrhea, or constipation. Denies any abdominal pain. Denies change in bowel habits or stools. Genito-Urinary:    Denies any urgency, frequency, hematuria.   Voiding without difficulty. Musculoskeletal:   Denies joint pain, joint stiffness, joint swelling or muscle pain    Neurology:    Denies any headache or focal neurological deficits. No weakness or paresthesia. Derm:    Denies any rashes, ulcers, or excoriations. Denies bruising. Extremities:   Denies any lower extremity swelling or edema. PHYSICAL EXAM: Abnormal findings noted  VITALS:  Vitals:    02/16/23 0700   BP: (!) 136/90   Pulse: 83   Resp: 24   Temp:    SpO2: 96%         CONSTITUTIONAL:    Awake, alert, cooperative, no apparent distress, and appears stated age    Patient is intubated and sedated    EYES:    sclera clear, conjunctiva normal    ENT:    Normocephalic, atraumatic,  External ears without lesions. Patient has OG tube and ET tube in place    NECK:    Supple, symmetrical, trachea midline,no JVD    HEMATOLOGIC/LYMPHATICS:    No cervical lymphadenopathy and no supraclavicular lymphadenopathy    LUNGS:    coarse decreased breath sounds to auscultation bilaterally, no wheezes, rhonchi, or rales,     CARDIOVASCULAR:    Normal apical impulse, regular rate and rhythm, normal S1 and S2, no S3 or S4, and no murmur noted    ABDOMEN:    , soft, non-distended, non-tender, morbidly obese    MUSCULOSKELETAL:    There is no redness, warmth, or swelling of the joints. NEUROLOGIC:    Awake, alert, oriented to name, place and time. Patient currently intubated and sedated    SKIN:    No bruising or bleeding. No redness, warmth, or swelling    EXTREMITIES:    Peripheral pulses present. No edema, cyanosis, or swelling.   Patient has bilateral lower extremity edema    LINES/CATHETERS   Patient has right IJ CVC  Whitaker catheter in place    LABORATORY DATA:  CBC with Differential:    Lab Results   Component Value Date/Time    WBC 6.5 02/16/2023 04:25 AM    RBC 4.04 02/16/2023 04:25 AM    HGB 12.7 02/16/2023 04:25 AM    HCT 38.7 02/16/2023 04:25 AM     02/16/2023 04:25 AM    MCV 95.8 02/16/2023 04:25 AM MCH 31.4 02/16/2023 04:25 AM    MCHC 32.8 02/16/2023 04:25 AM    RDW 12.9 02/16/2023 04:25 AM    SEGSPCT 73 03/12/2014 08:10 AM    LYMPHOPCT 10.3 02/16/2023 04:25 AM    MONOPCT 10.6 02/16/2023 04:25 AM    EOSPCT 2 10/21/2010 10:25 AM    BASOPCT 0.6 02/16/2023 04:25 AM    MONOSABS 0.69 02/16/2023 04:25 AM    LYMPHSABS 0.67 02/16/2023 04:25 AM    EOSABS 0.39 02/16/2023 04:25 AM    BASOSABS 0.04 02/16/2023 04:25 AM     CMP:    Lab Results   Component Value Date/Time     02/16/2023 04:25 AM    K 3.8 02/16/2023 04:25 AM    K 4.1 02/08/2023 02:33 PM     02/16/2023 04:25 AM    CO2 30 02/16/2023 04:25 AM    BUN 13 02/16/2023 04:25 AM    CREATININE 0.8 02/16/2023 04:25 AM    GFRAA >60 07/31/2019 09:44 AM    LABGLOM >60 02/16/2023 04:25 AM    GLUCOSE 149 02/16/2023 04:25 AM    GLUCOSE 99 05/30/2012 08:37 AM    PROT 5.9 02/16/2023 04:25 AM    LABALBU 3.1 02/16/2023 04:25 AM    LABALBU 4.2 05/30/2012 08:37 AM    CALCIUM 8.5 02/16/2023 04:25 AM    BILITOT 0.3 02/16/2023 04:25 AM    ALKPHOS 84 02/16/2023 04:25 AM    AST 13 02/16/2023 04:25 AM    ALT 14 02/16/2023 04:25 AM       ASSESSMENT/PLAN:  Acute hypoxemic and hypercapnic respiratory failure requiring mechanical ventilation  Exacerbation of COPD with current tobacco abuse  Encephalopathy rule out meningitis  Community-acquired pneumonia  History of multiple sclerosis since age 28  Hypertension  Hyperlipidemia  Obesity hypoventilation syndrome  History of alcohol and drug abuse  Morbid obesity with BMI 56.60 kg/m²  Hypothyroidism      Patient presented with altered mental status. Patient became increasingly more confused and agitated. Patient was intubated in the ED. There is concern for meningitis. However lumbar puncture was unable to be performed. Patient placed on acyclovir, meropenem, vancomycin. Patient is immunocompromise in the setting of Tecf[employer] for his MS. Cultures ordered. Consider ID consultation.   IR has been consulted for lumbar puncture. Critical care consulted and patient accepted to ICU. Further evaluation and management per critical care. Patient is awaiting [facility] pending bed availability.    -Home medication reviewed  -N.p.o. with patient intubated  -Lactated Ringer's at 75 cc an hour  -DuoNeb aerosols 4 times daily  -Pulmicort aerosol twice daily  -NG tube feedings    -Patient extubated 2/11 PM    -IV Merrem  -IV vancomycin  -IV Levaquin  -Lovenox 60 mg SQ twice daily  -PT/OT    -Transferred to monitored bed When okay with pulmonology    -BMP, CBC in a.mFrances Faulkner DO  8:01 AM  2/16/2023

## 2023-02-16 NOTE — PROGRESS NOTES
Report called to [facility] DR PETER ROCHA on the 3rd floor.

## 2023-02-16 NOTE — PROGRESS NOTES
CRITICAL CARE PROGRESS NOTE     The patient's case was discussed in multidisciplinary rounds including critical care specialist, nursing, RT and pharmacy.    Evaluation is as follows:     Low grade temp but not spiking - monitor  Resp status improved on prn and qhs BIPAP      OBJECTIVE:      Intake/Output Summary (Last 24 hours) at 2/16/2023 1750  Last data filed at 2/16/2023 1536  Gross per 24 hour   Intake 1367.46 ml   Output 4200 ml   Net -2832.54 ml       PHYSICAL EXAM:  BP (!) 165/88   Pulse 89   Temp 100.1 °F (37.8 °C) (Oral)   Resp 17   Ht 5' 10\" (1.778 m)   Wt (!) 401 lb 5 oz (182 kg)   SpO2 (!) 89%   BMI 57.58 kg/m²   Access: periph  O2: NC  Gen: Awake and alert  CV: RRR S1 S2 no m/g/r  Resp: Slight rales bl  Abd: soft NT ND  Extr: mild edema  Neuro: non focal    MEDICATIONS:   meropenem  1,000 mg IntraVENous Q8H    vancomycin  1,250 mg IntraVENous Q8H    levoFLOXacin  750 mg Oral Daily    pantoprazole  40 mg Oral QAM AC    gabapentin  600 mg Oral BID    nicotine  1 patch TransDERmal Daily    baclofen  10 mg Oral TID    sodium chloride flush  5-40 mL IntraVENous 2 times per day    levothyroxine  175 mcg Oral Daily    metoprolol tartrate  25 mg Oral BID    pramipexole  0.125 mg Oral TID    enoxaparin  60 mg SubCUTAneous BID    ipratropium-albuterol  1 ampule Inhalation Q4H    sodium chloride flush  5-40 mL IntraVENous 2 times per day    budesonide  0.5 mg Nebulization BID      dextrose      sodium chloride       HYDROcodone-acetaminophen, glucose, dextrose bolus **OR** dextrose bolus, glucagon (rDNA), dextrose, sodium chloride flush, polyethylene glycol, acetaminophen **OR** acetaminophen, sodium chloride flush, sodium chloride      LABS:  CBC:   Lab Results   Component Value Date/Time    WBC 6.5 02/16/2023 04:25 AM    RBC 4.04 02/16/2023 04:25 AM    HGB 12.7 02/16/2023 04:25 AM    HCT 38.7 02/16/2023 04:25 AM    MCV 95.8 02/16/2023 04:25 AM    MCH 31.4 02/16/2023 04:25 AM    MCHC 32.8 02/16/2023 04:25 AM    RDW 12.9 02/16/2023 04:25 AM     02/16/2023 04:25 AM    MPV 10.5 02/16/2023 04:25 AM     CMP:    Lab Results   Component Value Date/Time     02/16/2023 04:25 AM    K 3.8 02/16/2023 04:25 AM    K 4.1 02/08/2023 02:33 PM     02/16/2023 04:25 AM    CO2 30 02/16/2023 04:25 AM    BUN 13 02/16/2023 04:25 AM    CREATININE 0.8 02/16/2023 04:25 AM    GFRAA >60 07/31/2019 09:44 AM    LABGLOM >60 02/16/2023 04:25 AM    GLUCOSE 149 02/16/2023 04:25 AM    GLUCOSE 99 05/30/2012 08:37 AM    PROT 5.9 02/16/2023 04:25 AM    LABALBU 3.1 02/16/2023 04:25 AM    LABALBU 4.2 05/30/2012 08:37 AM    CALCIUM 8.5 02/16/2023 04:25 AM    BILITOT 0.3 02/16/2023 04:25 AM    ALKPHOS 84 02/16/2023 04:25 AM    AST 13 02/16/2023 04:25 AM    ALT 14 02/16/2023 04:25 AM     Magnesium:    Lab Results   Component Value Date/Time    MG 2.0 02/16/2023 04:25 AM     Phosphorus:    Lab Results   Component Value Date/Time    PHOS 2.6 02/16/2023 04:25 AM     LDH:  No results found for: LDH  PT/INR:  No results found for: PROTIME, INR  Troponin:    Lab Results   Component Value Date/Time    TROPONINI <0.01 12/10/2018 06:59 AM     ABG:    Lab Results   Component Value Date/Time    PH 7.433 02/13/2023 04:32 AM    PCO2 46.2 02/13/2023 04:32 AM    PO2 86.2 02/13/2023 04:32 AM    HCO3 30.2 02/13/2023 04:32 AM    BE 5.1 02/13/2023 04:32 AM    O2SAT 96.0 02/13/2023 04:32 AM     HgBA1c:    Lab Results   Component Value Date/Time    LABA1C 5.5 11/14/2018 09:48 AM         RADIOLOGY:  XR CHEST PORTABLE   Final Result   No acute process. Stable appearance of the chest compared to 02/14/2023. XR CHEST PORTABLE   Final Result   Bilateral ground-glass infiltrates consistent with atelectasis, pulmonary   edema and/or pneumonitis. These are not significantly changed given the   differences in technique. Small bilateral pleural effusions right more than left, improved. Stable enlargement of the cardiac silhouette.          XR CHEST PORTABLE   Final Result   Cardiomegaly, mild pulmonary venous hypertension and minimal perihilar hazy   opacity. XR CHEST PORTABLE   Final Result   Persistent subsegmental atelectasis in the right base with discrete increased   density in the right parahilar region. XR CHEST PORTABLE   Final Result   Unchanged atelectasis and or infiltrate suggested at the right base. Cardiomegaly. US DUP LOWER EXTREMITIES BILATERAL VENOUS   Final Result   No evidence of DVT in either lower extremity. XR CHEST PORTABLE   Final Result   Suboptimal inspiration with likely atelectasis at the right base. Indeterminate position of the nasogastric tube tip. Consider an abdominal   radiograph for this purpose. CT ABDOMEN PELVIS W IV CONTRAST Additional Contrast? None   Final Result   Grossly normal CT scan abdomen and pelvis. CT CHEST W CONTRAST   Final Result   Right lung atelectasis, of otherwise unremarkable CT scan chest.         XR ABDOMEN FOR NG/OG/NE TUBE PLACEMENT   Final Result   Nasogastric tube is poorly discerned on the abdominal films but CT chest   confirms that tip of this device terminates at the gastroesophageal junction. Preliminary report is provided via ancillary staff to a license caregiver on   02/08/2023 at 9:14 p.m. EST. XR CHEST PORTABLE   Final Result   1. Medical support devices as above. The enteric tube on this exam appears   to slightly retracted with distal tip projecting over the central mediastinum. (Suggest confirmation with dedicated radiographic evaluation of the lower   chest and upper abdomen and if confirmed, suggest repositioning.)      2. Atherosclerotic disease and prominence of the cardiac silhouette. There   are bilateral interstitial opacities which are unchanged. The findings were sent to the Radiology Results [address] at 6:55   pm on 2/8/2023 to be communicated to a licensed caregiver.          XR CHEST PORTABLE   Final Result   ETT as noted above. CT HEAD WO CONTRAST   Final Result   No acute intracranial abnormality. XR CHEST PORTABLE   Final Result   No acute process. Mild cardiomegaly. PROBLEM LIST:  Principal Problem:    Acute encephalopathy  Resolved Problems:    * No resolved hospital problems. *      ASSESSMENT/PLAN:     IMPRESSION:  Acute hypoxemic and hypercapnic respiratory failure  Morbid obesity  Obstructive sleep apnea  Right lower lobe pneumonia, improving  COPD with exacerbation secondary to heavy and longtime cigarette smoking  History of illicit drug use  Nicotine addiction  Alcohol abuse  Multiple sclerosis  Probable obstructive sleep apnea     PLAN:  Continue antibiotics for approximately 1 more days  BiPAP nightly  Agree with changing Airvo to simple nasal cannula since he is improving  Continue aerosolized bronchodilators and inhaled steroids  Continue anticoagulation      DVT Proph, Feeding, PT/OT/OOB    ATTESTATION:  ICU Staff Physician note of personal involvement in Care  As the attending physician, I certify that I personally reviewed the patients history and personnally examined the patient to confirm the physical findings described above,  And that I reviewed the relevant imaging studies and available reports. I also discussed the differential diagnosis and all of the proposed management plans with the patient and individuals accompanying the patient to this visit. They had the opportunity to ask questions about the proposed management plans and to have those questions answered. This patient has a high probability of sudden, clinically significant deterioration, which requires the highest level of physician preparedness to intervene urgently. I managed/supervised life or organ supporting interventions that required frequent physician assessment.    I devoted my full attention to the direct care of this patient for the amount of time indicated below.  Time I spent with the family or surrogate(s) is included only if the patient was incapable of providing the necessary information or participating in medical decisions - Time devoted to teaching and to any procedures I billed separately is not included.     CRITICAL CARE TIME:  31 min    Javi Verma MD  Pulmonary and Critical Care Medicine  02/16/23

## 2023-02-16 NOTE — PROGRESS NOTES
Speech Language Pathology      NAME:  Radha Paris  :  1965  DATE: 2023  ROOM:  [place] [id number]    Patient seen for dysphagia therapy 10 minutes. Patient tolerating current diet of soft and bite sized. Discussed diet upgrade. Patient reported he was tolerating diet well. Patient wants to remain on diet a few more days and then will trial harder solids.   Will continue POC    Encephalitis [G04.90]  Acute encephalopathy [G93.40]    [id number]  dysphagia tx    Kermit Worrell MSCCC/SLP  Speech Language Pathologist  [phone]

## 2023-02-16 NOTE — DISCHARGE INSTR - COC
Continuity of Care Form    Patient Name: Hayes De Los Santos   :  1965  MRN:  [id number]    Admit date:  2023  Discharge date:  ***    Code Status Order: Full Code   Advance Directives:     Admitting Physician:  Marry Esparza DO  PCP: Mayelin Borrero DO    Discharging Nurse: [facility] Unit/Room#: IC08/IC08-01  Discharging Unit Phone Number: ***    Emergency Contact:   Extended Emergency Contact Information  Primary Emergency Contact: Yamilet Tao  Address: [address] [address]  Home Phone: [phone]  Mobile Phone: [phone]  Relation: Parent  Secondary Emergency Contact: [facility] Phone: [phone]  Mobile Phone: [phone]  Relation: Child    Past Surgical History:  Past Surgical History:   Procedure Laterality Date    HERNIA REPAIR      TONSILLECTOMY         Immunization History: There is no immunization history on file for this patient.     Active Problems:  Patient Active Problem List   Diagnosis Code    Leg swelling M79.89    Lymphedema of both lower extremities I89.0    Tinea pedis of both feet B35.3    Dermatophytosis B35.9    Multiple sclerosis ([facility] [address] G35    Acute encephalopathy G93.40       Isolation/Infection:   Isolation            No Isolation          Patient Infection Status       Infection Onset Added Last Indicated Last Indicated By Review Planned Expiration Resolved Resolved By    None active    Resolved    COVID-19 (Rule Out) 23 Respiratory Panel, Molecular, with COVID-19 (Restricted: peds pts or suitable admitted adults) (Ordered)   23 Rule-Out Test Resulted    COVID-19 (Rule Out) 23 COVID-19, Rapid (Ordered)   23 Rule-Out Test Resulted            Nurse Assessment:  Last Vital Signs: BP (!) 154/80   Pulse 90   Temp 100 °F (37.8 °C) (Bladder)   Resp 29   Ht 5' 10\" (1.778 m)   Wt (!) 401 lb 5 oz (182 kg)   SpO2 90%   BMI 57.58 kg/m²     Last documented pain score (0-10 scale): Pain Level: 0  Last Weight:   Wt Readings from Last 1 Encounters:   02/16/23 (!) 401 lb 5 oz (182 kg)     Mental Status:  oriented, alert, able to concentrate and follow conversation, and sometimes confused at night    IV Access:  - None    Nursing Mobility/ADLs:  Walking   Assisted  Transfer  Assisted  Bathing  Dependent  Dressing  Assisted  [address] [facility] 22 Delivery   whole    Wound Care Documentation and Therapy:        Elimination:  Continence: Bowel: Yes  Bladder: sometimes continent. Has issues using a urinal.  Urinary Catheter: None   Colostomy/Ileostomy/Ileal Conduit: No       Date of Last BM: 2/20/2023    Intake/Output Summary (Last 24 hours) at 2/16/2023 1519  Last data filed at 2/16/2023 1200  Gross per 24 hour   Intake 480 ml   Output 1800 ml   Net -1320 ml     I/O last 3 completed shifts: In: 1822 [P.O.:600; I.V.:259; IV Piggyback:962.9]  Out: 3450 [Urine:3450]    Safety Concerns:     History of Falls (last 30 days) and At Risk for Falls    Impairments/Disabilities:      Vision and multiple sclerosis    Nutrition Therapy:  Current Nutrition Therapy:   - Oral Diet:  General    Routes of Feeding: Oral  Liquids: Thin Liquids  Daily Fluid Restriction: no  Last Modified Barium Swallow with Video (Video Swallowing Test): not done    Treatments at the Time of Hospital Discharge:   Respiratory Treatments: Proventil, atrovent, pulmicort  Oxygen Therapy:  is not on home oxygen therapy. However, patient is discharging on 3L NC.   Ventilator:    - BiPAP   IPAP: 13 cmH20, CPAP/EPAP: 8 cmH2O only when sleeping    Rehab Therapies: Physical Therapy and Occupational Therapy  Weight Bearing Status/Restrictions: No weight bearing restrictions  Other Medical Equipment (for information only, NOT a DME order):  walker, bedside commode, and hospital bed  Other Treatments: None    Patient's personal belongings (please select all that are sent with patient):  Glasses    RN SIGNATURE:  Electronically signed by Sara Baptiste RN on 2/20/23 at 2:39 PM EST    CASE MANAGEMENT/SOCIAL WORK SECTION    Inpatient Status Date: 2/8/2023    Readmission Risk Assessment Score:  Readmission Risk              Risk of Unplanned Readmission:  17           Discharging to Facility/ Agency   Name: [facility] [place]  [place]  [address]  [phone]      Dialysis Facility (if applicable)   Name:  Address:  Dialysis Schedule:  Phone:  Fax:    / signature: Electronically signed by Ashlie Munoz RN-BC on 2/16/2023 at 3:19 PM      PHYSICIAN SECTION    Prognosis: Good    Condition at Discharge: Stable    Rehab Potential (if transferring to Rehab): Good    Recommended Labs or Other Treatments After Discharge:     Physician Certification: I certify the above information and transfer of Calderon Tao  is necessary for the continuing treatment of the diagnosis listed and that he requires Skilled Nursing Facility for less 30 days.     Update Admission H&P: {CHP DME Changes in HandP:[id number]}    PHYSICIAN SIGNATURE:  Electronically signed by Dr.Douglas JULIA Patel DO on 02/20/23 at 5:16 PM

## 2023-02-16 NOTE — PROGRESS NOTES
Physical Therapy  Physical Therapy Treatment Note/Plan of Care    Room #:  IC08/IC08-01  Patient Name: Shira Blake  YOB: 1965  MRN: [id number]    Date of Service: 2/16/2023     Tentative placement recommendation: Subacute Rehab  Equipment recommendation: To be determined      Evaluating Physical Therapist: Raina Gil, PT  #[id number]      Specific Provider Orders/Date/Referring Provider :  02/13/23 1145    PT eval and treat  Start:  02/13/23 1145,   End:  02/13/23 1145,   ONE TIME,   Standing Count:  1 Occurrences,   Blair Wong MD     Admitting Diagnosis:   Encephalitis [G04.90]  Acute encephalopathy [G93.40]     Admitted with    altered mental status , hx ms  right lung atelectasis  Intubated 2/8-2/11  Surgery: none  Visit Diagnoses         Codes    Encephalitis    -  Primary G04.90            Patient Active Problem List   Diagnosis    Leg swelling    Lymphedema of both lower extremities    Tinea pedis of both feet    Dermatophytosis    Multiple sclerosis ([facility] [address]    Acute encephalopathy        ASSESSMENT of Current Deficits Patient exhibits decreased strength, balance, endurance, range of motion, and coordination impairing functional mobility, transfers, gait , gait distance, and tolerance to activity are barriers to d/c and require skilled intervention to address concerns listed above to increase safety and independence at discharge. Pt able to sit EOB, max assist x2 as well as stand with MaxA x 2 but was unable to sit step along bed. Pt with impaired gait prior to illness d/t weakness in Right lower extremity \"I drag it\"  Overall debility, recent intubation and pre morbid mobility issues impact  functional independence.        PHYSICAL THERAPY  PLAN OF CARE       Physical therapy plan of care is established based on physician order,  patient diagnosis and clinical assessment    Current Treatment Recommendations:    -Bed Mobility: Lower extremity exercises , Upper extremity exercises , and Trunk control activities   -Sitting Balance: Incorporate reaching activities to activate trunk muscles , Hands on support to maintain midline , Facilitate active trunk muscle engagement , Facilitate postural control in all planes , and Engage in core activities to allow for movement within base of support   -Standing Balance: Perform strengthening exercises in standing to promote motor control with or without upper extremity support  and Instruct patient on adequate base of support to maintain balance  -Transfers: Provide instruction on proper hand and foot position for adequate transfer of weight onto lower extremities and use of gait device if needed, Cues for hand placement, technique and safety. Provide stabilization to prevent fall , Support transfer of weight on to lower extremities, and Assist with extension of knees trunk and hip to accept weight transfer     PT long term treatment goals are located in below grid    Patient and or family understand(s) diagnosis, prognosis, and plan of care. Frequency of treatments: Patient will be seen  daily.          Prior Level of Function: Patient ambulated independently    Rehab Potential: good   for baseline    Past medical history:   Past Medical History:   Diagnosis Date    Dermatophytosis 1/12/2023    Hyperlipidemia     Hypertension     Leg pain     Leg swelling 1/12/2023    Lymphedema     Lymphedema of both lower extremities 1/12/2023    MS (multiple sclerosis) ([facility] [address]     Multiple sclerosis ([facility] [address]     Multiple sclerosis ([facility] [address] 1/12/2023    With significant weakness of both legs follows up with [facility]    Spinal stenosis, lumbar     Thyroid disease     Tinea pedis of both feet 1/12/2023     Past Surgical History:   Procedure Laterality Date    HERNIA REPAIR      TONSILLECTOMY         SUBJECTIVE:    Precautions: titrate/wean oxygen and pep flutter valve, falls, alarm, and O2 , air vo    Social history: Patient lives with mother and resides  in basement with one flight of steps       Equipment owned: unknown,       AM-PAC Basic Mobility        AM-PAC Basic Mobility - Inpatient   How much help is needed turning from your back to your side while in a flat bed without using bedrails?: A Lot  How much help is needed moving from lying on your back to sitting on the side of a flat bed without using bedrails?: A Lot  How much help is needed moving to and from a bed to a chair?: Total  How much help is needed standing up from a chair using your arms?: Total  How much help is needed walking in hospital room?: Total  How much help is needed climbing 3-5 steps with a railing?: Total  AM-PAC Inpatient Mobility Raw Score : 8  AM-PAC Inpatient T-Scale Score : 28.52  Mobility Inpatient CMS 0-100% Score: 86.62  Mobility Inpatient CMS G-Code Modifier : CM    Nursing cleared patient for PT treatment. OBJECTIVE;   Initial Evaluation  Date: 2/13/2023 Treatment Date:    2/16/2023   Short Term/ Long Term   Goals   Was pt agreeable to Eval/treatment? Yes Yes To be met in 5 days   Pain level   0/10    5/10  back    Bed Mobility    Rolling: Moderate assist of 1    Supine to sit: Maximal assist of 1    Sit to supine: Dependent of  2    Scooting: Maximal assist of 1   Rolling: Maximal assist of  2   Supine to sit: Maximal assist of  2   Sit to supine: Maximal assist of  2   Scooting: Maximal assist of  2    Rolling: Minimal assist of 1    Supine to sit: Minimal assist of 1    Sit to supine: Minimal assist of 1    Scooting: Minimal assist of 1     Transfers Sit to stand: Not assessed    Sit to stand: Maximal assist of  2 x 2 reps    Sit to stand:  Moderate assist of 1     Ambulation    not assessed  not assessed, unable      10 feet using  wheeled walker with Moderate assist of  2    ROM impaired   Increase range of motion 10% of affected joints    Strength BUE:   3/5  RLE:  1/5  LLE:  2/5  Increase strength in affected mm groups by 1/3 grade   Balance Sitting EOB:  poor multiplane instability with poor sense of upright  Dynamic Standing:  not assessed   Sitting EOB: fair -, right lean noted, min/mod assist to correct  Dynamic Standing: not assessed    Sitting EOB:  fair        Patient is Alert & Oriented x person, place, and time and follows one step directions    Sensation:  Patient  denies numbness/tingling   Edema:  yes bilateral lower extremities and right hand   Endurance: poor fatigues easily    Vitals: Heated high flow 50 L/min 50%  Blood Pressure at rest  Blood Pressure during session    Heart Rate at rest   Heart Rate during session     SPO2 at rest %  SPO2 during session 89-94%     Patient education  Patient educated on role of Physical Therapy, risks of immobility, safety and plan of care, importance of positional changes for oxygen exchange,  importance of mobility while in hospital , safety , and positioning for skin integrity and comfort     Patient response to education:   Pt verbalized understanding Pt demonstrated skill Pt requires further education in this area   Yes Partial Yes      Treatment:  Patient practiced and was instructed/facilitated in the following treatment: Patient   Sat edge of bed 15 minutes with Minimal assist of 1 to increase dynamic sitting balance and activity tolerance. Pt performed bed mobility, transfers, sat EOB and performed seated exercises with Saeed/ModA. Therapeutic Exercises:  ankle pumps, heel raises, and long arc quad 10 reps     At end of session, patient in bed with  call light and phone within reach,  all lines and tubes intact, nursing notified. Patient would benefit from continued skilled Physical Therapy to improve functional independence and quality of life.          Patient's/ family goals   none stated    Time in  909  Time out  937    Total Treatment Time  28 minutes    CPT codes:  Therapeutic activities (61753)   20 minutes  1 unit(s)  Therapeutic exercises (53927)   8 minutes  1 unit(s)    Lina Mena, PT

## 2023-02-17 ENCOUNTER — APPOINTMENT (OUTPATIENT)
Dept: GENERAL RADIOLOGY | Age: 58
DRG: 208 | End: 2023-02-17
Payer: MEDICARE

## 2023-02-17 PROBLEM — M25.562 ACUTE PAIN OF LEFT KNEE: Status: ACTIVE | Noted: 2023-02-17
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
## 2023-02-17 LAB
ALBUMIN SERPL-MCNC: 3.2 G/DL (ref 3.5–5.2)
ALP BLD-CCNC: 80 U/L (ref 40–129)
ALT SERPL-CCNC: 18 U/L (ref 0–40)
ANION GAP SERPL CALCULATED.3IONS-SCNC: 7 MMOL/L (ref 7–16)
AST SERPL-CCNC: 19 U/L (ref 0–39)
B.E.: 4.5 MMOL/L (ref -3–3)
BILIRUB SERPL-MCNC: 0.4 MG/DL (ref 0–1.2)
BLOOD CULTURE, ROUTINE: NORMAL
BUN BLDV-MCNC: 9 MG/DL (ref 6–20)
CALCIUM SERPL-MCNC: 8.9 MG/DL (ref 8.6–10.2)
CHLORIDE BLD-SCNC: 105 MMOL/L (ref 98–107)
CHOLESTEROL, TOTAL: 196 MG/DL (ref 0–199)
CO2: 28 MMOL/L (ref 22–29)
COHB: 0.3 % (ref 0–1.5)
CREAT SERPL-MCNC: 0.8 MG/DL (ref 0.7–1.2)
CRITICAL: ABNORMAL
CULTURE, BLOOD 2: NORMAL
DATE ANALYZED: ABNORMAL
DATE OF COLLECTION: ABNORMAL
GFR SERPL CREATININE-BSD FRML MDRD: >60 ML/MIN/1.73
GLUCOSE BLD-MCNC: 130 MG/DL (ref 74–99)
HCO3: 29.4 MMOL/L (ref 22–26)
HDLC SERPL-MCNC: 22 MG/DL
HHB: 9.5 % (ref 0–5)
LAB: ABNORMAL
LDL CHOLESTEROL CALCULATED: 117 MG/DL (ref 0–99)
Lab: ABNORMAL
METER GLUCOSE: 181 MG/DL (ref 74–99)
METHB: 0.4 % (ref 0–1.5)
MODE: ABNORMAL
O2 CONTENT: 17.3 ML/DL
O2 SATURATION: 90.4 % (ref 92–98.5)
O2HB: 89.8 % (ref 94–97)
OPERATOR ID: 914
PATIENT TEMP: 37 C
PCO2: 44.8 MMHG (ref 35–45)
PH BLOOD GAS: 7.43 (ref 7.35–7.45)
PO2: 60.2 MMHG (ref 75–100)
POTASSIUM SERPL-SCNC: 3.5 MMOL/L (ref 3.5–5)
SODIUM BLD-SCNC: 140 MMOL/L (ref 132–146)
SOURCE, BLOOD GAS: ABNORMAL
THB: 13.7 G/DL (ref 11.5–16.5)
TIME ANALYZED: 112
TOTAL PROTEIN: 6.1 G/DL (ref 6.4–8.3)
TRIGL SERPL-MCNC: 287 MG/DL (ref 0–149)
VLDLC SERPL CALC-MCNC: 57 MG/DL

## 2023-02-17 PROCEDURE — 73560 X-RAY EXAM OF KNEE 1 OR 2: CPT

## 2023-02-17 PROCEDURE — 94640 AIRWAY INHALATION TREATMENT: CPT

## 2023-02-17 PROCEDURE — 82805 BLOOD GASES W/O2 SATURATION: CPT

## 2023-02-17 PROCEDURE — 6370000000 HC RX 637 (ALT 250 FOR IP): Performed by: INTERNAL MEDICINE

## 2023-02-17 PROCEDURE — 36600 WITHDRAWAL OF ARTERIAL BLOOD: CPT

## 2023-02-17 PROCEDURE — 2580000003 HC RX 258: Performed by: INTERNAL MEDICINE

## 2023-02-17 PROCEDURE — 80061 LIPID PANEL: CPT

## 2023-02-17 PROCEDURE — 94660 CPAP INITIATION&MGMT: CPT

## 2023-02-17 PROCEDURE — 80053 COMPREHEN METABOLIC PANEL: CPT

## 2023-02-17 PROCEDURE — 82962 GLUCOSE BLOOD TEST: CPT

## 2023-02-17 PROCEDURE — 2060000000 HC ICU INTERMEDIATE R&B

## 2023-02-17 PROCEDURE — 99291 CRITICAL CARE FIRST HOUR: CPT | Performed by: FAMILY MEDICINE

## 2023-02-17 PROCEDURE — 6360000002 HC RX W HCPCS: Performed by: INTERNAL MEDICINE

## 2023-02-17 PROCEDURE — 36415 COLL VENOUS BLD VENIPUNCTURE: CPT

## 2023-02-17 PROCEDURE — 2700000000 HC OXYGEN THERAPY PER DAY

## 2023-02-17 PROCEDURE — 97535 SELF CARE MNGMENT TRAINING: CPT

## 2023-02-17 PROCEDURE — 6370000000 HC RX 637 (ALT 250 FOR IP)

## 2023-02-17 PROCEDURE — 97530 THERAPEUTIC ACTIVITIES: CPT

## 2023-02-17 RX ORDER — ALBUTEROL SULFATE 2.5 MG/3ML
2.5 SOLUTION RESPIRATORY (INHALATION) EVERY 4 HOURS
Status: DISCONTINUED | OUTPATIENT
Start: 2023-02-17 | End: 2023-02-21 | Stop reason: HOSPADM

## 2023-02-17 RX ADMIN — ALBUTEROL SULFATE 2.5 MG: 2.5 SOLUTION RESPIRATORY (INHALATION) at 14:51

## 2023-02-17 RX ADMIN — BUDESONIDE 500 MCG: 0.5 SUSPENSION RESPIRATORY (INHALATION) at 06:46

## 2023-02-17 RX ADMIN — VANCOMYCIN HYDROCHLORIDE 1250 MG: 10 INJECTION, POWDER, LYOPHILIZED, FOR SOLUTION INTRAVENOUS at 01:28

## 2023-02-17 RX ADMIN — PRAMIPEXOLE DIHYDROCHLORIDE 0.12 MG: 0.12 TABLET ORAL at 22:14

## 2023-02-17 RX ADMIN — METOPROLOL TARTRATE 25 MG: 25 TABLET, FILM COATED ORAL at 22:14

## 2023-02-17 RX ADMIN — Medication 10 ML: at 02:34

## 2023-02-17 RX ADMIN — IPRATROPIUM BROMIDE 0.5 MG: 0.5 SOLUTION RESPIRATORY (INHALATION) at 14:51

## 2023-02-17 RX ADMIN — METOPROLOL TARTRATE 25 MG: 25 TABLET, FILM COATED ORAL at 09:38

## 2023-02-17 RX ADMIN — ALBUTEROL SULFATE 2.5 MG: 2.5 SOLUTION RESPIRATORY (INHALATION) at 18:23

## 2023-02-17 RX ADMIN — ALBUTEROL SULFATE 2.5 MG: 2.5 SOLUTION RESPIRATORY (INHALATION) at 21:16

## 2023-02-17 RX ADMIN — MEROPENEM 1000 MG: 1 INJECTION, POWDER, FOR SOLUTION INTRAVENOUS at 12:28

## 2023-02-17 RX ADMIN — IPRATROPIUM BROMIDE AND ALBUTEROL SULFATE 1 AMPULE: .5; 2.5 SOLUTION RESPIRATORY (INHALATION) at 02:35

## 2023-02-17 RX ADMIN — VANCOMYCIN HYDROCHLORIDE 1250 MG: 10 INJECTION, POWDER, LYOPHILIZED, FOR SOLUTION INTRAVENOUS at 16:17

## 2023-02-17 RX ADMIN — PRAMIPEXOLE DIHYDROCHLORIDE 0.12 MG: 0.12 TABLET ORAL at 16:17

## 2023-02-17 RX ADMIN — LEVOFLOXACIN 750 MG: 750 TABLET, FILM COATED ORAL at 09:38

## 2023-02-17 RX ADMIN — IPRATROPIUM BROMIDE AND ALBUTEROL SULFATE 1 AMPULE: .5; 2.5 SOLUTION RESPIRATORY (INHALATION) at 06:46

## 2023-02-17 RX ADMIN — MEROPENEM 1000 MG: 1 INJECTION, POWDER, FOR SOLUTION INTRAVENOUS at 22:39

## 2023-02-17 RX ADMIN — PANTOPRAZOLE SODIUM 40 MG: 40 TABLET, DELAYED RELEASE ORAL at 06:40

## 2023-02-17 RX ADMIN — IPRATROPIUM BROMIDE 0.5 MG: 0.5 SOLUTION RESPIRATORY (INHALATION) at 18:23

## 2023-02-17 RX ADMIN — BACLOFEN 10 MG: 10 TABLET ORAL at 14:11

## 2023-02-17 RX ADMIN — PRAMIPEXOLE DIHYDROCHLORIDE 0.12 MG: 0.12 TABLET ORAL at 10:41

## 2023-02-17 RX ADMIN — ENOXAPARIN SODIUM 60 MG: 100 INJECTION SUBCUTANEOUS at 22:14

## 2023-02-17 RX ADMIN — BACLOFEN 10 MG: 10 TABLET ORAL at 22:14

## 2023-02-17 RX ADMIN — HYDROCODONE BITARTRATE AND ACETAMINOPHEN 1 TABLET: 10; 325 TABLET ORAL at 22:14

## 2023-02-17 RX ADMIN — GABAPENTIN 600 MG: 300 CAPSULE ORAL at 09:38

## 2023-02-17 RX ADMIN — IPRATROPIUM BROMIDE 0.5 MG: 0.5 SOLUTION RESPIRATORY (INHALATION) at 21:16

## 2023-02-17 RX ADMIN — BACLOFEN 10 MG: 10 TABLET ORAL at 09:38

## 2023-02-17 RX ADMIN — Medication 10 ML: at 09:39

## 2023-02-17 RX ADMIN — LEVOTHYROXINE SODIUM 175 MCG: 0.17 TABLET ORAL at 06:40

## 2023-02-17 RX ADMIN — VANCOMYCIN HYDROCHLORIDE 1250 MG: 10 INJECTION, POWDER, LYOPHILIZED, FOR SOLUTION INTRAVENOUS at 10:44

## 2023-02-17 RX ADMIN — ENOXAPARIN SODIUM 60 MG: 100 INJECTION SUBCUTANEOUS at 09:38

## 2023-02-17 RX ADMIN — GABAPENTIN 600 MG: 300 CAPSULE ORAL at 22:14

## 2023-02-17 RX ADMIN — BUDESONIDE 500 MCG: 0.5 SUSPENSION RESPIRATORY (INHALATION) at 18:24

## 2023-02-17 RX ADMIN — MEROPENEM 1000 MG: 1 INJECTION, POWDER, FOR SOLUTION INTRAVENOUS at 04:28

## 2023-02-17 ASSESSMENT — PAIN - FUNCTIONAL ASSESSMENT: PAIN_FUNCTIONAL_ASSESSMENT: ACTIVITIES ARE NOT PREVENTED

## 2023-02-17 ASSESSMENT — PAIN SCALES - WONG BAKER: WONGBAKER_NUMERICALRESPONSE: 0

## 2023-02-17 ASSESSMENT — PAIN SCALES - GENERAL
PAINLEVEL_OUTOF10: 0
PAINLEVEL_OUTOF10: 8
PAINLEVEL_OUTOF10: 0

## 2023-02-17 ASSESSMENT — PAIN DESCRIPTION - DESCRIPTORS: DESCRIPTORS: ACHING

## 2023-02-17 ASSESSMENT — PAIN DESCRIPTION - LOCATION: LOCATION: LEG;BACK;GENERALIZED

## 2023-02-17 NOTE — PROGRESS NOTES
Pharmacy Consultation Note  (Antibiotic Dosing and Monitoring)    Initial consult date: 02/08/2023  Consulting physician/provider: Rice  Drug: Vancomycin  Indication: Central Nervous System Infection     Age/  Gender Height Weight IBW  Allergy Information   57 y.o./male 5' 10\" (177.8 cm) (!) 380 lb (172.4 kg)     Ideal body weight: 73 kg (160 lb 15 oz)  Adjusted ideal body weight: 116.6 kg (257 lb 1.4 oz)   Bactrim [sulfamethoxazole-trimethoprim], Penicillins, and Sulfa antibiotics      Renal Function:  Recent Labs     02/15/23  0438 02/16/23  0425   BUN 17 13   CREATININE 0.8 0.8         Intake/Output Summary (Last 24 hours) at 2/17/2023 1019  Last data filed at 2/16/2023 1536  Gross per 24 hour   Intake 1127.46 ml   Output 2400 ml   Net -1272.54 ml       Vancomycin Monitoring:  Trough:    Recent Labs     02/14/23  1540   VANCOTROUGH 18.7*       Random:  No results for input(s): VANCORANDOM in the last 72 hours. No results for input(s): Vonita Juanito in the last 72 hours. Historical Cultures:  No results found for: ORG  No results for input(s): BC in the last 72 hours. Recent vancomycin administrations                     vancomycin (VANCOCIN) 1,250 mg in sodium chloride 0.9 % 250 mL IVPB (mg) 1,250 mg New Bag 02/16/23 0819     1,250 mg New Bag  0009     1,250 mg New Bag 02/15/23 1714    vancomycin (VANCOCIN) 1,250 mg in sodium chloride 0.9 % 250 mL IVPB (mg) 1,250 mg New Bag 02/15/23 0826     1,250 mg New Bag  0021     1,250 mg New Bag 02/14/23 1642     1,250 mg New Bag  0838     1,250 mg New Bag  0026     1,250 mg New Bag 02/13/23 1611             Assessment:  Patient is a 65 y.o. male who has been initiated on vancomycin  Estimated Creatinine Clearance: 170 mL/min (based on SCr of 0.8 mg/dL). To dose vancomycin, pharmacy will be utilizing [employer] calculation software for goal AUC/BEAU 400-600 mg/L-hr  2/10: Random AM level = 8.6 mcg/mL. AUC/BEAU 290 mg/L.hr. Est true trough of 4.5 mcg/mL.   2/11: Trough @ 0757 = 14.2 mcg/mL, AUC/BEAU 459 mg/L-hr  2/14: Trough @ 1540 = 18.7 mcg/mL, AUC/BEAU 513. Plan:  Continue Vancomycin 1250 mg IV q8hr  10 days of therapy per Dr. Vasquez Cordova.   Tomorrow is last day of therapy   Will continue to monitor renal function   Pharmacy to follow    Ebenezer Vallejo PharmD, BCPS 2/17/2023 10:19 AM   Ext: [phone]

## 2023-02-17 NOTE — PROGRESS NOTES
met with patient and family.  offered affirmation, a listening presence, empathy, and nurtured hope. Spiritual care is ongoing.

## 2023-02-17 NOTE — CARE COORDINATION
2/17/23 1117 CM note: COVID (-) 2/9/23. 2L nc vs bipap FIO2 40%, IV vanco & merrem. PIV. Pt was transferred from ICU to 3rd floor last night, then tx to the 5th floor early this am per family request. Met with pt, pts daughter Nayeli Ren, and pts aunt at the bedside to discuss discharge planning. [facility] and START PRECERT TODAY. WILL NEED AUTH, RAPID COVID DAY OF DISCHARGE, SIGNED STEVEN, AND DC ORDER SO CM/SW CAN COMPLETE HENS (initiated). Pts daughter Nayeli Ren is upset about events that transpired between pts transfer from ICU to the 3rd floor and is requesting to speak with the pt advocate. Spoke with Alexis Persaud, pt eric, she she is going to follow up with Nayeli Ren. Also updated unit manager, Nguyen Red, on above. CM will follow. Electronically signed by Hayden Mauricio RN on 2/17/2023 at 11:46 AM    Update: 2/17/23 1435 Per Ju Knapp, [facility] liaison, they [address] [facility] PATIENT TO COME TO THEIR FACILITY and his Joseph Drake is good until Monday afternoon. Updated charge nurse [address]. CM will follow.  Electronically signed by Hayden aMuricio RN on 2/17/2023 at 2:44 PM

## 2023-02-17 NOTE — PROGRESS NOTES
OCCUPATIONAL THERAPY TREATMENT NOTE     [facility]   [address]        Date:2023  Patient Name: Liza Mendez  MRN: [id number]  : 1965  Room: [address] [place], [place] [address]-       Evaluating OT: Li Donaldson OTR/L; [id number]      Referring Provider and Specific Provider Orders/Date:      23   OT eval and treat  Start:  23,   End:  23,   ONE TIME,   Standing Count:  1 Occurrences,   Keith Roberson MD       Placement Recommendation: Subacute        Diagnosis:   1. Encephalitis         Surgery: none        Pertinent Medical History:       Past Medical History        Past Medical History:   Diagnosis Date    Dermatophytosis 2023    Hyperlipidemia      Hypertension      Leg pain      Leg swelling 2023    Lymphedema      Lymphedema of both lower extremities 2023    MS (multiple sclerosis) ([address]      Multiple sclerosis ([address]      Multiple sclerosis ([address] 2023     With significant weakness of both legs follows [facility] with [facility]    Spinal stenosis, lumbar      Thyroid disease      Tinea pedis of both feet 2023            Past Surgical History         Past Surgical History:   Procedure Laterality Date    HERNIA REPAIR        TONSILLECTOMY                Precautions:  Fall Risk, MS, Airvo, O2 saturation remained in the 90's during activity, /81.       Assessment of current deficits:     [x] Functional mobility            [x]ADLs           [x] Strength                   []Cognition    [x] Functional transfers          [x] IADLs          [] Safety Awareness   [x]Endurance    [] Fine Coordination              [x] Balance      [] Vision/perception    []Sensation      []Gross Motor Coordination  [x] ROM           [] Delirium                   [x] Motor Control      OT PLAN OF CARE   OT POC based on physician orders, patient diagnosis and results of clinical assessment     Frequency/Duration 1-3 days/wk for 2 weeks PRN      Specific OT Treatment Interventions to include:   * Instruction/training on adapted ADL techniques and AE recommendations to increase functional independence within precautions       * Training on energy conservation strategies, correct breathing pattern and techniques to improve independence/tolerance for self-care routine  * Functional transfer/mobility training/DME recommendations for increased independence, safety, and fall prevention  * Patient/Family education to increase follow through with safety techniques and functional independence  * Recommendation of environmental modifications for increased safety with functional transfers/mobility and ADLs  * Therapeutic exercise to improve motor endurance, ROM, and functional strength for ADLs/functional transfers  * Therapeutic activities to facilitate/challenge dynamic balance, stand tolerance for increased safety and independence with ADLs  * Positioning to improve skin integrity, interaction with environment and functional independence     Recommended Adaptive Equipment: TBD at rehab       Home Living: with his mom; resides in the basement, full bathroom in the basement with a walk in shower, one flight of steps to the basement. Equipment owned: wheeled walker, shower chair, grab bars      Prior Level of Function: Independent with ADLs , Independent with IADLs; ambulated with no device, pt states he has dragged his R LE for the past 9 years. Driving: yes   Occupation: not working     Pain Level: 7/10 pain in back, chronic; Nursing notified.            Cognition: A&O: 3/4; Follows 1 step directions              Memory: fair               Sequencing: fair               Problem solving: fair               Judgement/safety: fair      [facility]   AM-PAC Daily Activity - Inpatient   How much help is needed for putting on and taking off regular lower body clothing?: Total  How much help is needed for bathing (which includes washing, rinsing, drying)?: A Lot  How much help is needed for toileting (which includes using toilet, bedpan, or urinal)?: Total  How much help is needed for putting on and taking off regular upper body clothing?: A Lot  How much help is needed for taking care of personal grooming?: A Little  How much help for eating meals?: None  AM-PAC Inpatient Daily Activity Raw Score: 13  AM-PAC Inpatient ADL T-Scale Score : 32.03  ADL Inpatient CMS 0-100% Score: 63.03  ADL Inpatient CMS G-Code Modifier : CL                Functional Assessment:     Initial Eval Status  Date: 2/14/23 Treatment Status  Date: 2/17/23 STGs = LTGs  Time frame: 10-14 days   Feeding Supervision with set up  Supervision  Independent    Grooming Minimal Assist to comb hair while seated EOB MIN A seated EOB  Independent    UB Dressing Moderate Assist  MOD A seated EOB  Independent    LB Dressing Dependent  Dep to jonas/doff socks at bed level  Minimal Assist    Bathing Maximal Assist N/t; pt educated with regards to DME  Minimal Assist    Toileting Dependent for hygiene. Currently has a catheter. N/t Minimal Assist    Bed Mobility  Supine to sit: Maximal Assist x 2   Sit to supine: Maximal Assist x 2   Rolling: Maximal assist x 2 to adjust Comfort Glide and replace chux pads. Maximal assist x 2 to scoot up in bed. MAX A to supine>sit   MAX A x2 sit>supine   Pt requiring assist to bring B LE into bed, with trunk control  Supine to sit: Minimal Assist   Sit to supine: Minimal Assist    Functional Transfers Two attempts to stand from EOB with Maximal assist x 2, currently unable to lift hips off edge of bed. MAX A to sit<>stand from EOB to bariatric w/w v/c's for hand placement with pt completing stand then immediately returning to sit EOB  Minimal Assist    Functional Mobility N/T as pt was unable to stand.   N/t pt unable to maintain static standing  Minimal Assist    Balance Sitting:     Static: fair     Dynamic: fair minus  Standing: N/T Sitting:  Static: MIN A   Dynamic: MOD A   Standing: MAX A with bariatric w/w      Activity Tolerance Fair minus Fair-  good    Visual/  Perceptual Glasses: yes                      Comments: Upon arrival pt supine in bed, agreeable to therapy session. Pt educated with regards to bed mobility, functional transfers, hand placement, walker safety, static/dynamic sitting balance, UE dressing, grooming tasks. At end of session pt supine in bed,  all lines and tubes intact, call light within reach.     Pt has made fair-  progress towards set goals.   Continue with current plan of care      Treatment Time In:0955            Treatment Time Out: 1030                Treatment Charges: Mins Units   Ther Ex  08571     Manual Therapy 29963     Thera Activities 13962 20 1   ADL/Home Mgt 55450 15 1   Neuro Re-ed 75860     Group Therapy      Orthotic manage/training  92551     Non-Billable Time     Total Timed Treatment 35 2         [place] JHAVERI/AGNIESZKA [id number]

## 2023-02-17 NOTE — PROGRESS NOTES
Department of Internal Medicine  PN    PCP: Saad Alvarez DO  Admitting Physician: Dr. Patel  Consultants:   Date of Service: 2/8/2023    CHIEF COMPLAINT: Altered mental status    HISTORY OF PRESENT ILLNESS:    Patient is 57-year-old male who presented to the ED with altered mental status.  HPI obtained from staff and chart review.  Patient lives in the basement apparently.  Mother stated that she has a room overhead where her son stays.  States that she heard her son talking nonsensically and repeating phrases.  She confronted him and tried to assist him.  Later in the day patient went to the bathroom however did not come out and mother became concerned and called EMS..  On presentation patient was able to answer questions although confused.  patient was intubated due to protection of airway and need for further evaluation in the setting of agitation.    2/9/2023  Patient seen examined on ICU.  Patient's family members at the bedside and case discussed.  Case discussed with patient's nurse at the bedside.  BUN/creatinine 16/1.0 with fasting blood sugar 195.  Procalcitonin was 0.04.  Drug screen positive for opiates with the patient on Percocet at home.  WBC 16.7 hemoglobin 13.6.  Urinalysis is unremarkable.  Temperature 99 with heart rate 71 blood pressure 132/87.  O2 sat 97% with the patient on assist control ventilator with a rate of 20 and 8 of PEEP and FiO2 55%.  Urinary output is adequate.    2/10/2023  Patient seen examined in ICU.  Patient still intubated and sedated.  Patient currently with NG tube feedings.  BUN/creatinine 15/1.1 with liver enzymes normal.  WBC is 9.8 with hemoglobin 12.3.  Viral respiratory panel was negative.  Temperature is 100 degrees with heart rate of 69 blood pressure 134/83.  O2 sat 98% with the patient on assist control ventilator with a rate of 20 and 8 of PEEP with FiO2 45%.  Urine output about 550 cc a shift.  Chest x-ray this morning shows unchanged atelectasis and/or  infiltrate in the right base. 2/11/2023  Patient seen and examined on ICU. Case discussed with patient's nurse at the bedside. BUN/creatinine was 10/0.9 with normal electrolytes. Transaminases normal with WBC with fasting blood sugar 178. CBC is normal.  Temperature 99.7 with heart rate 76 and blood pressure 151/90. O2 sat 97% with patient assist control ventilator with rate of 20 and 8 of PEEP and FiO2 40%. Urinary output is good. Case discussed with intensivist today. We will attempt weaning off ventilator today. 2/12/2023  Patient seen examined on ICU. Patient was extubated yesterday afternoon. Patient is alert oriented very very weak and still very short of breath with any activity. Case discussed with patient nurse at the bedside. BUN/creatinine 11/0.8 with normal electrolytes. Liver enzymes are normal with WBC 7.8 hemoglobin 12.7. Temperature 99.9 with heart rate 73 and blood pressure 137/86. O2 sat 95% with the patient on BiPAP with FiO2 50%. 2/13/2023  Patient seen and examined on ICU. Patient is alert and oriented to person place. The patient is very weak and has not been out of bed yet. Patient stated he tolerated eating pudding this morning without problems. BUN/creatinine 17/0.8 with normal electrolytes. Liver enzymes are normal with a WBC of 7.3 and hemoglobin 12.7. Temperature is 100.4 with heart rate of 71 blood pressure 158/90. O2 sat 95% with the patient on heated high flow nasal cannula with FiO2 55%. Urine output ranges 350-500 cc a shift. Increase activity, consult PT/OT. 2/14/2023  Patient seen examined in the ICU. Patient's family is at the bedside and case discussed. The patient is alert and oriented x3. The patient though still very weak and tired. Patient says he got up yesterday but again is very weak and short of breath with any activity. Patient tolerating alternation of BiPAP and heated high flow nasal cannula. BUN/creatinine 20/0.8 with. Transaminases normal with fasting blood sugar 142. WBC 6.6 with hemoglobin 12.6. Temperature is 99.3 with heart rate of 67 blood pressure ranges 95//95. O2 sat is 93% with the patient on heated high flow nasal cannula with FiO2 55%. Patient currently on BiPAP every 4 hours and at at bedtime alternating with heated high flow nasal cannula. Urinary output is good. 2/15/2023  Patient seen examined on ICU. Patient's wife and daughters at the bedside and case discussed. The patient is awake and alert and oriented x3. Patient's oral intake is poor-fair. Patient still very very weak. BUN/creatinine 17/0.8 with normal electrolytes. Liver enzymes normal with normal transaminase. WBC 6.8 hemoglobin 12.8. Temperature 99.5 with heart rate 75 blood pressure 133/86. O2 sat 92% on-96% on heated high flow nasal cannula with FiO2 40%. Pulmonology, speech therapy note reviewed. 2/16/2023  Patient seen examined in the ICU. Patient is feeling a lot better today. Patient currently on room air is O2 sat at rest was 93%. Patient denies any current chest or abdominal pain. Patient though still very very weak. BUN/creatinine 13/0.8 with normal electrolytes. Fasting blood sugar 149. Liver enzymes normal with WBC 6.5 and hemoglobin 12.7. Temperature is 99 with heart rate of 88 and blood pressure 135/88. O2 sat 93-96% on 6 L nasal cannula. Urine output is good ranging 900-1800 cc a shift. Pulmonology note reviewed. Patient's appetite is poor-fair. Patient most likely be transferred to telemetry floor today. 2/17/2023  Patient seen examined on PCU. Patient again is alert and oriented x3. Patient appetite is slowly improving but patient is eating candy bars and drinking orange crush drink. Patient denies any chest or abdominal pain. Patient is feeling better. Temperature 98.8 with heart rate 83 and blood pressure 104/71. O2 sat 90% on 2 L nasal cannula.   Patient is having about 2 bowel movements today.  Urine output is very good. Patient still very very weak and is unable to ambulate and with PT OT the patient was just standing from a sitting position and then sitting back down. PAST MEDICAL Hx:  Past Medical History:   Diagnosis Date    Dermatophytosis 1/12/2023    Hyperlipidemia     Hypertension     Leg pain     Leg swelling 1/12/2023    Lymphedema     Lymphedema of both lower extremities 1/12/2023    MS (multiple sclerosis) ([facility]     Multiple sclerosis ([facility] [address]     Multiple sclerosis ([facility] [address] 1/12/2023    With significant weakness of both legs follows up with [facility]    Spinal stenosis, lumbar     Thyroid disease     Tinea pedis of both feet 1/12/2023       PAST SURGICAL Hx:   Past Surgical History:   Procedure Laterality Date    HERNIA REPAIR      TONSILLECTOMY         FAMILY Hx:  No family history on file. HOME MEDICATIONS:  Prior to Admission medications    Medication Sig Start Date End Date Taking? Authorizing Provider   levothyroxine (SYNTHROID) 175 MCG tablet Take 175 mcg by mouth every morning (before breakfast) 8/27/18  Yes Historical Provider, MD   metFORMIN (GLUCOPHAGE) 500 MG tablet Take 500 mg by mouth 2 times daily 3/4/22  Yes Historical Provider, MD   Monomethyl Fumarate (BAFIERTAM) 95 MG CPDR Take 190 mg by mouth 2 times daily 1/24/23 1/24/24 Yes Historical Provider, MD   ADVAIR -21 MCG/ACT inhaler Inhale 2 puffs into the lungs in the morning and at bedtime 2/8/23   Historical Provider, MD   gabapentin (NEURONTIN) 600 MG tablet Take 1 tablet by mouth in the morning, at noon, and at bedtime. 12/26/22   Historical Provider, MD   HYDROcodone-acetaminophen (NORCO)  MG per tablet Take 1 tablet by mouth every 6 hours as needed.  2/6/23   Historical Provider, MD   torsemide (DEMADEX) 20 MG tablet Take 1 tablet by mouth daily 12/31/22   Historical Provider, MD   valsartan (DIOVAN) 160 MG tablet Take 1 tablet by mouth daily 12/5/22   Historical Provider, MD   miconazole nitrate 2 % OINT Apply topically 2 times daily Apply to the toes in between the toes both feet and both calfs up to the knee twice a day for 1 month 1/19/23   Donna Bain MD   spironolactone (ALDACTONE) 25 MG tablet Take 25 mg by mouth daily    Historical Provider, MD   pramipexole (MIRAPEX) 0.125 MG tablet Take 0.125 mg by mouth 3 times daily    Historical Provider, MD   baclofen (LIORESAL) 20 MG tablet Take 20 mg by mouth 3 times daily    Historical Provider, MD   metoprolol tartrate (LOPRESSOR) 25 MG tablet Take 25 mg by mouth 2 times daily    Historical Provider, MD   vitamin E 1000 UNITS capsule Take 1,000 Units by mouth daily. Historical Provider, MD   loratadine (CLARITIN) 10 MG tablet Take 10 mg by mouth daily.       Historical Provider, MD       ALLERGIES:  Bactrim [sulfamethoxazole-trimethoprim], Penicillins, and Sulfa antibiotics    SOCIAL Hx:  Social History     Socioeconomic History    Marital status:      Spouse name: Not on file    Number of children: Not on file    Years of education: Not on file    Highest education level: Not on file   Occupational History    Not on file   Tobacco Use    Smoking status: Every Day     Packs/day: 1.00     Types: Cigarettes    Smokeless tobacco: Never   Substance and Sexual Activity    Alcohol use: Yes     Comment: rarely    Drug use: Not Currently     Types: Marijuana [place])     Comment: edible    Sexual activity: Not on file   Other Topics Concern    Not on file   Social History Narrative    ** Merged History Encounter **          Social Determinants of Health     Financial Resource Strain: Not on file   Food Insecurity: Not on file   Transportation Needs: Not on file   Physical Activity: Not on file   Stress: Not on file   Social Connections: Not on file   Intimate Partner Violence: Not on file   Housing Stability: Not on file       ROS: Positive in bold  General:   Denies chills, fatigue, fever, malaise, night sweats or weight loss    Psychological:   Denies anxiety, disorientation or hallucinations    ENT:    Denies epistaxis, headaches, vertigo or visual changes    Cardiovascular:   Denies any chest pain, irregular heartbeats, or palpitations. No paroxysmal nocturnal dyspnea. Respiratory:   Denies shortness of breath, coughing, sputum production, hemoptysis, or wheezing. No orthopnea. Gastrointestinal:   Denies nausea, vomiting, diarrhea, or constipation. Denies any abdominal pain. Denies change in bowel habits or stools. Genito-Urinary:    Denies any urgency, frequency, hematuria. Voiding without difficulty. Musculoskeletal:   Denies joint pain, joint stiffness, joint swelling or muscle pain    Neurology:    Denies any headache or focal neurological deficits. No weakness or paresthesia. Derm:    Denies any rashes, ulcers, or excoriations. Denies bruising. Extremities:   Denies any lower extremity swelling or edema. PHYSICAL EXAM: Abnormal findings noted  VITALS:  Vitals:    02/17/23 0949   BP: 129/86   Pulse: 84   Resp: 18   Temp: 97.5 °F (36.4 °C)   SpO2: 94%         CONSTITUTIONAL:    Awake, alert, cooperative, no apparent distress, and appears stated age    Patient is intubated and sedated    EYES:    sclera clear, conjunctiva normal    ENT:    Normocephalic, atraumatic,  External ears without lesions. Patient has OG tube and ET tube in place    NECK:    Supple, symmetrical, trachea midline,no JVD    HEMATOLOGIC/LYMPHATICS:    No cervical lymphadenopathy and no supraclavicular lymphadenopathy    LUNGS:    coarse decreased breath sounds to auscultation bilaterally, no wheezes, rhonchi, or rales,     CARDIOVASCULAR:    Normal apical impulse, regular rate and rhythm, normal S1 and S2, no S3 or S4, and no murmur noted    ABDOMEN:    , soft, non-distended, non-tender, morbidly obese    MUSCULOSKELETAL:    There is no redness, warmth, or swelling of the joints.       NEUROLOGIC:    Awake, alert, oriented to name, place and time. Patient currently intubated and sedated    SKIN:    No bruising or bleeding. No redness, warmth, or swelling    EXTREMITIES:    Peripheral pulses present. No edema, cyanosis, or swelling.   Patient has bilateral lower extremity edema    LINES/CATHETERS   Patient has right IJ CVC  Whitaker catheter in place    LABORATORY DATA:  CBC with Differential:    Lab Results   Component Value Date/Time    WBC 6.5 02/16/2023 04:25 AM    RBC 4.04 02/16/2023 04:25 AM    HGB 12.7 02/16/2023 04:25 AM    HCT 38.7 02/16/2023 04:25 AM     02/16/2023 04:25 AM    MCV 95.8 02/16/2023 04:25 AM    MCH 31.4 02/16/2023 04:25 AM    MCHC 32.8 02/16/2023 04:25 AM    RDW 12.9 02/16/2023 04:25 AM    SEGSPCT 73 03/12/2014 08:10 AM    LYMPHOPCT 10.3 02/16/2023 04:25 AM    MONOPCT 10.6 02/16/2023 04:25 AM    EOSPCT 2 10/21/2010 10:25 AM    BASOPCT 0.6 02/16/2023 04:25 AM    MONOSABS 0.69 02/16/2023 04:25 AM    LYMPHSABS 0.67 02/16/2023 04:25 AM    EOSABS 0.39 02/16/2023 04:25 AM    BASOSABS 0.04 02/16/2023 04:25 AM     CMP:    Lab Results   Component Value Date/Time     02/16/2023 04:25 AM    K 3.8 02/16/2023 04:25 AM    K 4.1 02/08/2023 02:33 PM     02/16/2023 04:25 AM    CO2 30 02/16/2023 04:25 AM    BUN 13 02/16/2023 04:25 AM    CREATININE 0.8 02/16/2023 04:25 AM    GFRAA >60 07/31/2019 09:44 AM    LABGLOM >60 02/16/2023 04:25 AM    GLUCOSE 149 02/16/2023 04:25 AM    GLUCOSE 99 05/30/2012 08:37 AM    PROT 5.9 02/16/2023 04:25 AM    LABALBU 3.1 02/16/2023 04:25 AM    LABALBU 4.2 05/30/2012 08:37 AM    CALCIUM 8.5 02/16/2023 04:25 AM    BILITOT 0.3 02/16/2023 04:25 AM    ALKPHOS 84 02/16/2023 04:25 AM    AST 13 02/16/2023 04:25 AM    ALT 14 02/16/2023 04:25 AM       ASSESSMENT/PLAN:  Acute hypoxemic and hypercapnic respiratory failure requiring mechanical ventilation  Exacerbation of COPD with current tobacco abuse  Encephalopathy rule out meningitis  Community-acquired pneumonia  History of multiple sclerosis since age 28  Hypertension  Hyperlipidemia  Obesity hypoventilation syndrome  History of alcohol and drug abuse  Morbid obesity with BMI 56.60 kg/m²  Hypothyroidism      Patient presented with altered mental status. Patient became increasingly more confused and agitated. Patient was intubated in the ED. There is concern for meningitis. However lumbar puncture was unable to be performed. Patient placed on acyclovir, meropenem, vancomycin. Patient is immunocompromise in the setting of Tecf[employer] for his MS. Cultures ordered. Consider ID consultation. IR has been consulted for lumbar puncture. Critical care consulted and patient accepted to ICU. Further evaluation and management per critical care. Patient is awaiting transfer to [facility] pending bed availability.    -Home medication reviewed  -N.p.o. with patient intubated  -Lactated Ringer's at 75 cc an hour  -DuoNeb aerosols 4 times daily  -Pulmicort aerosol twice daily  -NG tube feedings    -Patient extubated 2/11 PM    -IV Merrem  -IV vancomycin  -IV Levaquin  -Lovenox 60 mg SQ twice daily  -PT/OT    -Transferred to PCU 2/16    -BMP, CBC in rita Faulkner DO  10:39 AM  2/17/2023

## 2023-02-17 NOTE — FLOWSHEET NOTE
Inpatient Wound Care    Admit Date: 2/8/2023  1:38 PM    Reason for consult:  skin care    Significant history:    Past Medical History:   Diagnosis Date    Dermatophytosis 1/12/2023    Hyperlipidemia     Hypertension     Leg pain     Leg swelling 1/12/2023    Lymphedema     Lymphedema of both lower extremities 1/12/2023    MS (multiple sclerosis) ([address]     Multiple sclerosis ([address]     Multiple sclerosis ([address] 1/12/2023    With significant weakness of both legs follows up with [facility]    Spinal stenosis, lumbar     Thyroid disease     Tinea pedis of both feet 1/12/2023       Wound history:      Findings:     02/17/23 1210   Wound 02/17/23 Neck Right   Date First Assessed/Time First Assessed: 02/17/23 1209   Primary Wound Type: Other (comment)  Location: Neck  Wound Location Orientation: Right   Wound Image    Wound Cleansed Cleansed with saline   Wound Width (cm) 0.8 cm   Wound Depth (cm) 0.6 cm   Wound Assessment Pink/red   Drainage Amount Scant   Drainage Description Sanguinous   Odor None   Ester-wound Assessment   (red rash)   Wound 02/17/23 Arm Proximal;Right   Date First Assessed/Time First Assessed: 02/17/23 1212   Present on Hospital Admission: Yes  Primary Wound Type: Other (comment)  Location: Arm  Wound Location Orientation: Proximal;Right   Wound Image    Wound Cleansed Cleansed with saline   Wound Length (cm) 0.8 cm   Wound Width (cm) 2 cm   Wound Surface Area (cm^2) 1.6 cm^2   Drainage Amount Scant   Odor None   Wound 02/17/23 Arm Right; Lower   Date First Assessed/Time First Assessed: 02/17/23 1221   Primary Wound Type: Other (comment)  Location: Arm  Wound Location Orientation: Right; Lower   Wound Image    Wound Cleansed Cleansed with saline   Wound Length (cm) 1.2 cm   Wound Width (cm) 1.8 cm   Wound Surface Area (cm^2) 2.16 cm^2   Drainage Amount Scant   Odor None   Ester-wound Assessment   (redness)       Impression:  redness on neck  rash  Bilateral heels red  Inner groins abd folds red Buttocks red    Interventions in place:  low air loss bed  Mepilex to sacral area      Plan:  Mepilex to heels   Mepilex tosacral area  Mepilex to open areas on right arm      Kenyon Garcia RN 2/17/2023 12:24 PM

## 2023-02-18 PROCEDURE — 94669 MECHANICAL CHEST WALL OSCILL: CPT

## 2023-02-18 PROCEDURE — 2060000000 HC ICU INTERMEDIATE R&B

## 2023-02-18 PROCEDURE — 2580000003 HC RX 258: Performed by: INTERNAL MEDICINE

## 2023-02-18 PROCEDURE — 2580000003 HC RX 258: Performed by: STUDENT IN AN ORGANIZED HEALTH CARE EDUCATION/TRAINING PROGRAM

## 2023-02-18 PROCEDURE — 6370000000 HC RX 637 (ALT 250 FOR IP): Performed by: INTERNAL MEDICINE

## 2023-02-18 PROCEDURE — 94660 CPAP INITIATION&MGMT: CPT

## 2023-02-18 PROCEDURE — 94640 AIRWAY INHALATION TREATMENT: CPT

## 2023-02-18 PROCEDURE — 2700000000 HC OXYGEN THERAPY PER DAY

## 2023-02-18 PROCEDURE — 6370000000 HC RX 637 (ALT 250 FOR IP)

## 2023-02-18 PROCEDURE — 6360000002 HC RX W HCPCS: Performed by: INTERNAL MEDICINE

## 2023-02-18 RX ORDER — ACETAMINOPHEN 500 MG
500 TABLET ORAL EVERY 6 HOURS PRN
Status: DISCONTINUED | OUTPATIENT
Start: 2023-02-18 | End: 2023-02-18

## 2023-02-18 RX ORDER — TRAMADOL HYDROCHLORIDE 50 MG/1
50 TABLET ORAL EVERY 6 HOURS PRN
Status: DISCONTINUED | OUTPATIENT
Start: 2023-02-18 | End: 2023-02-21 | Stop reason: HOSPADM

## 2023-02-18 RX ADMIN — LEVOFLOXACIN 750 MG: 750 TABLET, FILM COATED ORAL at 08:47

## 2023-02-18 RX ADMIN — PRAMIPEXOLE DIHYDROCHLORIDE 0.12 MG: 0.12 TABLET ORAL at 08:51

## 2023-02-18 RX ADMIN — MEROPENEM 1000 MG: 1 INJECTION, POWDER, FOR SOLUTION INTRAVENOUS at 06:47

## 2023-02-18 RX ADMIN — VANCOMYCIN HYDROCHLORIDE 1250 MG: 10 INJECTION, POWDER, LYOPHILIZED, FOR SOLUTION INTRAVENOUS at 00:41

## 2023-02-18 RX ADMIN — METOPROLOL TARTRATE 25 MG: 25 TABLET, FILM COATED ORAL at 21:21

## 2023-02-18 RX ADMIN — VANCOMYCIN HYDROCHLORIDE 1250 MG: 10 INJECTION, POWDER, LYOPHILIZED, FOR SOLUTION INTRAVENOUS at 17:08

## 2023-02-18 RX ADMIN — BUDESONIDE 500 MCG: 0.5 SUSPENSION RESPIRATORY (INHALATION) at 04:39

## 2023-02-18 RX ADMIN — ALBUTEROL SULFATE 2.5 MG: 2.5 SOLUTION RESPIRATORY (INHALATION) at 18:35

## 2023-02-18 RX ADMIN — HYDROCODONE BITARTRATE AND ACETAMINOPHEN 1 TABLET: 10; 325 TABLET ORAL at 08:47

## 2023-02-18 RX ADMIN — Medication 10 ML: at 21:21

## 2023-02-18 RX ADMIN — GABAPENTIN 600 MG: 300 CAPSULE ORAL at 08:47

## 2023-02-18 RX ADMIN — METOPROLOL TARTRATE 25 MG: 25 TABLET, FILM COATED ORAL at 08:47

## 2023-02-18 RX ADMIN — ENOXAPARIN SODIUM 60 MG: 100 INJECTION SUBCUTANEOUS at 21:21

## 2023-02-18 RX ADMIN — MEROPENEM 1000 MG: 1 INJECTION, POWDER, FOR SOLUTION INTRAVENOUS at 14:27

## 2023-02-18 RX ADMIN — IPRATROPIUM BROMIDE 0.5 MG: 0.5 SOLUTION RESPIRATORY (INHALATION) at 18:35

## 2023-02-18 RX ADMIN — IPRATROPIUM BROMIDE 0.5 MG: 0.5 SOLUTION RESPIRATORY (INHALATION) at 21:57

## 2023-02-18 RX ADMIN — ALBUTEROL SULFATE 2.5 MG: 2.5 SOLUTION RESPIRATORY (INHALATION) at 13:20

## 2023-02-18 RX ADMIN — ALBUTEROL SULFATE 2.5 MG: 2.5 SOLUTION RESPIRATORY (INHALATION) at 04:39

## 2023-02-18 RX ADMIN — IPRATROPIUM BROMIDE 0.5 MG: 0.5 SOLUTION RESPIRATORY (INHALATION) at 04:39

## 2023-02-18 RX ADMIN — VANCOMYCIN HYDROCHLORIDE 1250 MG: 10 INJECTION, POWDER, LYOPHILIZED, FOR SOLUTION INTRAVENOUS at 10:54

## 2023-02-18 RX ADMIN — IPRATROPIUM BROMIDE 0.5 MG: 0.5 SOLUTION RESPIRATORY (INHALATION) at 09:35

## 2023-02-18 RX ADMIN — PRAMIPEXOLE DIHYDROCHLORIDE 0.12 MG: 0.12 TABLET ORAL at 14:29

## 2023-02-18 RX ADMIN — ALBUTEROL SULFATE 2.5 MG: 2.5 SOLUTION RESPIRATORY (INHALATION) at 09:34

## 2023-02-18 RX ADMIN — BACLOFEN 10 MG: 10 TABLET ORAL at 08:47

## 2023-02-18 RX ADMIN — ALBUTEROL SULFATE 2.5 MG: 2.5 SOLUTION RESPIRATORY (INHALATION) at 21:57

## 2023-02-18 RX ADMIN — IPRATROPIUM BROMIDE 0.5 MG: 0.5 SOLUTION RESPIRATORY (INHALATION) at 13:20

## 2023-02-18 RX ADMIN — LEVOTHYROXINE SODIUM 175 MCG: 0.17 TABLET ORAL at 06:48

## 2023-02-18 RX ADMIN — Medication 10 ML: at 08:50

## 2023-02-18 RX ADMIN — BUDESONIDE 500 MCG: 0.5 SUSPENSION RESPIRATORY (INHALATION) at 18:36

## 2023-02-18 RX ADMIN — BACLOFEN 10 MG: 10 TABLET ORAL at 14:29

## 2023-02-18 RX ADMIN — BACLOFEN 10 MG: 10 TABLET ORAL at 21:21

## 2023-02-18 RX ADMIN — ALBUTEROL SULFATE 2.5 MG: 2.5 SOLUTION RESPIRATORY (INHALATION) at 01:15

## 2023-02-18 RX ADMIN — GABAPENTIN 600 MG: 300 CAPSULE ORAL at 21:21

## 2023-02-18 RX ADMIN — PANTOPRAZOLE SODIUM 40 MG: 40 TABLET, DELAYED RELEASE ORAL at 06:48

## 2023-02-18 RX ADMIN — ENOXAPARIN SODIUM 60 MG: 100 INJECTION SUBCUTANEOUS at 08:47

## 2023-02-18 RX ADMIN — PRAMIPEXOLE DIHYDROCHLORIDE 0.12 MG: 0.12 TABLET ORAL at 21:21

## 2023-02-18 RX ADMIN — HYDROCODONE BITARTRATE AND ACETAMINOPHEN 1 TABLET: 10; 325 TABLET ORAL at 15:52

## 2023-02-18 RX ADMIN — IPRATROPIUM BROMIDE 0.5 MG: 0.5 SOLUTION RESPIRATORY (INHALATION) at 01:15

## 2023-02-18 RX ADMIN — MEROPENEM 1000 MG: 1 INJECTION, POWDER, FOR SOLUTION INTRAVENOUS at 21:23

## 2023-02-18 ASSESSMENT — PAIN - FUNCTIONAL ASSESSMENT
PAIN_FUNCTIONAL_ASSESSMENT: ACTIVITIES ARE NOT PREVENTED
PAIN_FUNCTIONAL_ASSESSMENT: PREVENTS OR INTERFERES SOME ACTIVE ACTIVITIES AND ADLS

## 2023-02-18 ASSESSMENT — PAIN SCALES - GENERAL
PAINLEVEL_OUTOF10: 0
PAINLEVEL_OUTOF10: 7
PAINLEVEL_OUTOF10: 0
PAINLEVEL_OUTOF10: 0
PAINLEVEL_OUTOF10: 1
PAINLEVEL_OUTOF10: 0
PAINLEVEL_OUTOF10: 6

## 2023-02-18 ASSESSMENT — PAIN DESCRIPTION - ORIENTATION
ORIENTATION: MID
ORIENTATION: LOWER;UPPER

## 2023-02-18 ASSESSMENT — PAIN DESCRIPTION - LOCATION
LOCATION: BACK
LOCATION: BACK

## 2023-02-18 ASSESSMENT — PAIN SCALES - WONG BAKER: WONGBAKER_NUMERICALRESPONSE: 0

## 2023-02-18 ASSESSMENT — PAIN DESCRIPTION - DESCRIPTORS
DESCRIPTORS: ACHING
DESCRIPTORS: ACHING

## 2023-02-18 NOTE — CODE DOCUMENTATION
Staff at rrt:  Dr. Kiko Watkins  Primary RN Ana Garcia  Others- RN [facility]  ICU RN- [address] [facility] [address]  [place]

## 2023-02-18 NOTE — PROGRESS NOTES
Department of Internal Medicine  PN    PCP: Celestine Smart, DO  Admitting Physician: Dr. Anner Baumgarten  Consultants:   Date of Service: 2/8/2023    CHIEF COMPLAINT: Altered mental status    HISTORY OF PRESENT ILLNESS:    Patient is 60-year-old male who presented to the ED with altered mental status. HPI obtained from staff and chart review. Patient lives in the basement apparently. Mother stated that she has a room overhead where her son stays. States that she heard her son talking nonsensically and repeating phrases. She confronted him and tried to assist him. Later in the day patient went to the bathroom however did not come out and mother became concerned and called EMS. .  On presentation patient was able to answer questions although confused. patient was intubated due to protection of airway and need for further evaluation in the setting of agitation. 2/9/2023  Patient seen examined on ICU. Patient's family members at the bedside and case discussed. Case discussed with patient's nurse at the bedside. BUN/creatinine 16/1.0 with fasting blood sugar 195. Procalcitonin was 0.04. Drug screen positive for opiates with the patient on Percocet at home. WBC 16.7 hemoglobin 13.6. Urinalysis is unremarkable. Temperature 99 with heart rate 71 blood pressure 132/87. O2 sat 97% with the patient on assist control ventilator with a rate of 20 and 8 of PEEP and FiO2 55%. Urinary output is adequate. 2/10/2023  Patient seen examined in ICU. Patient still intubated and sedated. Patient currently with NG tube feedings. BUN/creatinine 15/1.1 with liver enzymes normal.  WBC is 9.8 with hemoglobin 12.3. Viral respiratory panel was negative. Temperature is 100 degrees with heart rate of 69 blood pressure 134/83. O2 sat 98% with the patient on assist control ventilator with a rate of 20 and 8 of PEEP with FiO2 45%. Urine output about 550 cc a shift.   Chest x-ray this morning shows unchanged atelectasis and/or infiltrate in the right base. 2/11/2023  Patient seen and examined on ICU. Case discussed with patient's nurse at the bedside. BUN/creatinine was 10/0.9 with normal electrolytes. Transaminases normal with WBC with fasting blood sugar 178. CBC is normal.  Temperature 99.7 with heart rate 76 and blood pressure 151/90. O2 sat 97% with patient assist control ventilator with rate of 20 and 8 of PEEP and FiO2 40%. Urinary output is good. Case discussed with intensivist today. We will attempt weaning off ventilator today. 2/12/2023  Patient seen examined on ICU. Patient was extubated yesterday afternoon. Patient is alert oriented very very weak and still very short of breath with any activity. Case discussed with patient nurse at the bedside. BUN/creatinine 11/0.8 with normal electrolytes. Liver enzymes are normal with WBC 7.8 hemoglobin 12.7. Temperature 99.9 with heart rate 73 and blood pressure 137/86. O2 sat 95% with the patient on BiPAP with FiO2 50%. 2/13/2023  Patient seen and examined on ICU. Patient is alert and oriented to person place. The patient is very weak and has not been out of bed yet. Patient stated he tolerated eating pudding this morning without problems. BUN/creatinine 17/0.8 with normal electrolytes. Liver enzymes are normal with a WBC of 7.3 and hemoglobin 12.7. Temperature is 100.4 with heart rate of 71 blood pressure 158/90. O2 sat 95% with the patient on heated high flow nasal cannula with FiO2 55%. Urine output ranges 350-500 cc a shift. Increase activity, consult PT/OT. 2/14/2023  Patient seen examined in the ICU. Patient's family is at the bedside and case discussed. The patient is alert and oriented x3. The patient though still very weak and tired. Patient says he got up yesterday but again is very weak and short of breath with any activity. Patient tolerating alternation of BiPAP and heated high flow nasal cannula. BUN/creatinine 20/0.8 with. Transaminases normal with fasting blood sugar 142. WBC 6.6 with hemoglobin 12.6. Temperature is 99.3 with heart rate of 67 blood pressure ranges 95//95. O2 sat is 93% with the patient on heated high flow nasal cannula with FiO2 55%. Patient currently on BiPAP every 4 hours and at at bedtime alternating with heated high flow nasal cannula. Urinary output is good. 2/15/2023  Patient seen examined on ICU. Patient's wife and daughters at the bedside and case discussed. The patient is awake and alert and oriented x3. Patient's oral intake is poor-fair. Patient still very very weak. BUN/creatinine 17/0.8 with normal electrolytes. Liver enzymes normal with normal transaminase. WBC 6.8 hemoglobin 12.8. Temperature 99.5 with heart rate 75 blood pressure 133/86. O2 sat 92% on-96% on heated high flow nasal cannula with FiO2 40%. Pulmonology, speech therapy note reviewed. 2/16/2023  Patient seen examined in the ICU. Patient is feeling a lot better today. Patient currently on room air is O2 sat at rest was 93%. Patient denies any current chest or abdominal pain. Patient though still very very weak. BUN/creatinine 13/0.8 with normal electrolytes. Fasting blood sugar 149. Liver enzymes normal with WBC 6.5 and hemoglobin 12.7. Temperature is 99 with heart rate of 88 and blood pressure 135/88. O2 sat 93-96% on 6 L nasal cannula. Urine output is good ranging 900-1800 cc a shift. Pulmonology note reviewed. Patient's appetite is poor-fair. Patient most likely be transferred to telemetry floor today. 2/17/2023  Patient seen examined on PCU. Patient again is alert and oriented x3. Patient appetite is slowly improving but patient is eating candy bars and drinking orange crush drink. Patient denies any chest or abdominal pain. Patient is feeling better. Temperature 98.8 with heart rate 83 and blood pressure 104/71. O2 sat 90% on 2 L nasal cannula.   Patient is having about 2 bowel movements today.  Urine output is very good.  Patient still very very weak and is unable to ambulate and with PT OT the patient was just standing from a sitting position and then sitting back down.    2/18/2023  Patient seen examined on PCU.  Patient tried to get out of bed by himself last night and he stated his foot slid on the floor and he fell on his left knee and side.  Patient's daughter is at the bedside again today and case discussed.  Reviewed with her today about patient's admission diagnosis and problem.  She was appreciative.  Patient is alert and oriented x3.  Patient understands that he is not to get out of bed by himself now.  Temperature 98.1 with heart rate of 74 blood pressure 145/84.  O2 sat 96% on 2 L nasal cannula.  Whitaker catheter was removed with the patient having an accurate I's and O's at this time.  Patient continues to improve and we are waiting for / to find rehab facility.  Patient will follow-up outpatient pulmonology after discharge.    PAST MEDICAL Hx:  Past Medical History:   Diagnosis Date    Dermatophytosis 1/12/2023    Hyperlipidemia     Hypertension     Leg pain     Leg swelling 1/12/2023    Lymphedema     Lymphedema of both lower extremities 1/12/2023    MS (multiple sclerosis) (HCC)     Multiple sclerosis (HCC)     Multiple sclerosis (HCC) 1/12/2023    With significant weakness of both legs follows up with [facility]    Spinal stenosis, lumbar     Thyroid disease     Tinea pedis of both feet 1/12/2023       PAST SURGICAL Hx:   Past Surgical History:   Procedure Laterality Date    HERNIA REPAIR      TONSILLECTOMY         FAMILY Hx:  No family history on file.    HOME MEDICATIONS:  Prior to Admission medications    Medication Sig Start Date End Date Taking? Authorizing Provider   levothyroxine (SYNTHROID) 175 MCG tablet Take 175 mcg by mouth every morning (before breakfast) 8/27/18  Yes Historical Provider, MD   metFORMIN (GLUCOPHAGE) 500 MG tablet Take  500 mg by mouth 2 times daily 3/4/22  Yes Historical Provider, MD   Monomethyl Fumarate (BAFIERTAM) 95 MG CPDR Take 190 mg by mouth 2 times daily 1/24/23 1/24/24 Yes Historical Provider, MD   ADVAIR -21 MCG/ACT inhaler Inhale 2 puffs into the lungs in the morning and at bedtime 2/8/23   Historical Provider, MD   gabapentin (NEURONTIN) 600 MG tablet Take 1 tablet by mouth in the morning, at noon, and at bedtime. 12/26/22   Historical Provider, MD   HYDROcodone-acetaminophen (NORCO)  MG per tablet Take 1 tablet by mouth every 6 hours as needed. 2/6/23   Historical Provider, MD   torsemide (DEMADEX) 20 MG tablet Take 1 tablet by mouth daily 12/31/22   Historical Provider, MD   valsartan (DIOVAN) 160 MG tablet Take 1 tablet by mouth daily 12/5/22   Historical Provider, MD   miconazole nitrate 2 % OINT Apply topically 2 times daily Apply to the toes in between the toes both feet and both calfs up to the knee twice a day for 1 month 1/19/23   Anneliese Drummond MD   spironolactone (ALDACTONE) 25 MG tablet Take 25 mg by mouth daily    Historical Provider, MD   pramipexole (MIRAPEX) 0.125 MG tablet Take 0.125 mg by mouth 3 times daily    Historical Provider, MD   baclofen (LIORESAL) 20 MG tablet Take 20 mg by mouth 3 times daily    Historical Provider, MD   metoprolol tartrate (LOPRESSOR) 25 MG tablet Take 25 mg by mouth 2 times daily    Historical Provider, MD   vitamin E 1000 UNITS capsule Take 1,000 Units by mouth daily. Historical Provider, MD   loratadine (CLARITIN) 10 MG tablet Take 10 mg by mouth daily.       Historical Provider, MD       ALLERGIES:  Bactrim [sulfamethoxazole-trimethoprim], Penicillins, and Sulfa antibiotics    SOCIAL Hx:  Social History     Socioeconomic History    Marital status:      Spouse name: Not on file    Number of children: Not on file    Years of education: Not on file    Highest education level: Not on file   Occupational History    Not on file   Tobacco Use Smoking status: Every Day     Packs/day: 1.00     Types: Cigarettes    Smokeless tobacco: Never   Substance and Sexual Activity    Alcohol use: Yes     Comment: rarely    Drug use: Not Currently     Types: Marijuana Dietra Due)     Comment: edible    Sexual activity: Not on file   Other Topics Concern    Not on file   Social History Narrative    ** Merged History Encounter **          Social Determinants of Health     Financial Resource Strain: Not on file   Food Insecurity: Not on file   Transportation Needs: Not on file   Physical Activity: Not on file   Stress: Not on file   Social Connections: Not on file   Intimate Partner Violence: Not on file   Housing Stability: Not on file       ROS: Positive in bold  General:   Denies chills, fatigue, fever, malaise, night sweats or weight loss    Psychological:   Denies anxiety, disorientation or hallucinations    ENT:    Denies epistaxis, headaches, vertigo or visual changes    Cardiovascular:   Denies any chest pain, irregular heartbeats, or palpitations. No paroxysmal nocturnal dyspnea. Respiratory:   Denies shortness of breath, coughing, sputum production, hemoptysis, or wheezing. No orthopnea. Gastrointestinal:   Denies nausea, vomiting, diarrhea, or constipation. Denies any abdominal pain. Denies change in bowel habits or stools. Genito-Urinary:    Denies any urgency, frequency, hematuria. Voiding without difficulty. Musculoskeletal:   Denies joint pain, joint stiffness, joint swelling or muscle pain    Neurology:    Denies any headache or focal neurological deficits. No weakness or paresthesia. Derm:    Denies any rashes, ulcers, or excoriations. Denies bruising. Extremities:   Denies any lower extremity swelling or edema.       PHYSICAL EXAM: Abnormal findings noted  VITALS:  Vitals:    02/18/23 0917   BP:    Pulse:    Resp: 18   Temp:    SpO2:          CONSTITUTIONAL:    Awake, alert, cooperative, no apparent distress, and appears stated age    Patient is intubated and sedated    EYES:    sclera clear, conjunctiva normal    ENT:    Normocephalic, atraumatic,  External ears without lesions. Patient has OG tube and ET tube in place    NECK:    Supple, symmetrical, trachea midline,no JVD    HEMATOLOGIC/LYMPHATICS:    No cervical lymphadenopathy and no supraclavicular lymphadenopathy    LUNGS:    coarse decreased breath sounds to auscultation bilaterally, no wheezes, rhonchi, or rales,     CARDIOVASCULAR:    Normal apical impulse, regular rate and rhythm, normal S1 and S2, no S3 or S4, and no murmur noted    ABDOMEN:    , soft, non-distended, non-tender, morbidly obese    MUSCULOSKELETAL:    There is no redness, warmth, or swelling of the joints. NEUROLOGIC:    Awake, alert, oriented to name, place and time. Patient currently intubated and sedated    SKIN:    No bruising or bleeding. No redness, warmth, or swelling    EXTREMITIES:    Peripheral pulses present. No edema, cyanosis, or swelling.   Patient has bilateral lower extremity edema    LINES/CATHETERS   Patient has right IJ CVC  Whitaker catheter in place    LABORATORY DATA:  CBC with Differential:    Lab Results   Component Value Date/Time    WBC 6.5 02/16/2023 04:25 AM    RBC 4.04 02/16/2023 04:25 AM    HGB 12.7 02/16/2023 04:25 AM    HCT 38.7 02/16/2023 04:25 AM     02/16/2023 04:25 AM    MCV 95.8 02/16/2023 04:25 AM    MCH 31.4 02/16/2023 04:25 AM    MCHC 32.8 02/16/2023 04:25 AM    RDW 12.9 02/16/2023 04:25 AM    SEGSPCT 73 03/12/2014 08:10 AM    LYMPHOPCT 10.3 02/16/2023 04:25 AM    MONOPCT 10.6 02/16/2023 04:25 AM    EOSPCT 2 10/21/2010 10:25 AM    BASOPCT 0.6 02/16/2023 04:25 AM    MONOSABS 0.69 02/16/2023 04:25 AM    LYMPHSABS 0.67 02/16/2023 04:25 AM    EOSABS 0.39 02/16/2023 04:25 AM    BASOSABS 0.04 02/16/2023 04:25 AM     CMP:    Lab Results   Component Value Date/Time     02/17/2023 08:52 AM    K 3.5 02/17/2023 08:52 AM    K 4.1 02/08/2023 02:33 PM     02/17/2023 08:52 AM    CO2 28 02/17/2023 08:52 AM    BUN 9 02/17/2023 08:52 AM    CREATININE 0.8 02/17/2023 08:52 AM    GFRAA >60 07/31/2019 09:44 AM    LABGLOM >60 02/17/2023 08:52 AM    GLUCOSE 130 02/17/2023 08:52 AM    GLUCOSE 99 05/30/2012 08:37 AM    PROT 6.1 02/17/2023 08:52 AM    LABALBU 3.2 02/17/2023 08:52 AM    LABALBU 4.2 05/30/2012 08:37 AM    CALCIUM 8.9 02/17/2023 08:52 AM    BILITOT 0.4 02/17/2023 08:52 AM    ALKPHOS 80 02/17/2023 08:52 AM    AST 19 02/17/2023 08:52 AM    ALT 18 02/17/2023 08:52 AM       ASSESSMENT/PLAN:  Acute hypoxemic and hypercapnic respiratory failure requiring mechanical ventilation  Exacerbation of COPD with current tobacco abuse  Encephalopathy rule out meningitis  Community-acquired pneumonia  History of multiple sclerosis since age 28  Hypertension  Hyperlipidemia  Obesity hypoventilation syndrome  History of alcohol and drug abuse  Morbid obesity with BMI 56.60 kg/m²  Hypothyroidism      Patient presented with altered mental status. Patient became increasingly more confused and agitated. Patient was intubated in the ED. There is concern for meningitis. However lumbar puncture was unable to be performed. Patient placed on acyclovir, meropenem, vancomycin. Patient is immunocompromise in the setting of [employer] for his MS. Cultures ordered. Consider ID consultation. IR has been consulted for lumbar puncture. Critical care consulted and patient accepted to ICU. Further evaluation and management per critical care.   Patient is awaiting transfer to [facility] pending bed availability.    -Home medication reviewed  -N.p.o. with patient intubated  -Lactated Ringer's at 75 cc an hour  -DuoNeb aerosols 4 times daily  -Pulmicort aerosol twice daily  -NG tube feedings    -Patient extubated 2/11 PM    -IV Merrem  -IV vancomycin  -IV Levaquin  -Lovenox 60 mg SQ twice daily  -PT/OT    -Transferred to PCU 2/16  Levaquin 750 mg p.o. daily x6 doses--day 3    Consult social services/case management for discharge planning to temporary extended-care facility for rehab    -BMP, CBC in rita Mayer DO  10:41 AM  2/18/2023

## 2023-02-18 NOTE — PROGRESS NOTES
Patient found post unwitnessed fall on floor. RRT called. Denies loss of consciousness or hitting head. Initial complaints of slight L knee pain, xray ordered by Dr. Khushi Centeno. Vital signs stable. Patient placed back in bed after evaluation by Dr. Khushi Centeno. Gripper socks on, bed rails up. Patient educated not to get out of bed without assistance. Dr. Yaritza White called to notify of event. No new orders received. Will continue to monitor patient.

## 2023-02-18 NOTE — PROGRESS NOTES
Pharmacy Consultation Note  (Antibiotic Dosing and Monitoring)    Initial consult date: 02/08/2023  Consulting physician/provider: Rice  Drug: Vancomycin  Indication: Central Nervous System Infection       Patient completes 10-day course of merrem and vancomycin IV tonight. Pharmacy will sign off on the vancomycin dosing consult.  Please re-consult if additional therapy is necessary      Ruslan Comer PharmD 2/18/2023 1:29 PM   [phone]

## 2023-02-18 NOTE — SIGNIFICANT EVENT
[address] [place], [place] [address]ist RRT/Code Blue Note    Subjective:    Called to bedside at 2023 for complaint of unwitnessed patient fall. Per patient, who was laying on his right side at foot of the bed, he was standing up while getting out of bed unassisted, and his left foot slid on the floor. Patient did not have gripper socks on. Patient states he fell onto his left knee, then down to his left side. Denies hitting his head or LOC, remembers falling. Also states his IV had started to bleed after falling so he pulled it out. Hoverjack obtained per nursing supervisor and with assist of 8 staff members, patient lifted with hoverjack and slid back onto bed. X-ray left knee ordered. Telesitter ordered and nursing supervisor will obtain.        albuterol  2.5 mg Nebulization Q4H    And    ipratropium  0.5 mg Nebulization Q4H    meropenem  1,000 mg IntraVENous Q8H    vancomycin  1,250 mg IntraVENous Q8H    levoFLOXacin  750 mg Oral Daily    pantoprazole  40 mg Oral QAM AC    gabapentin  600 mg Oral BID    nicotine  1 patch TransDERmal Daily    baclofen  10 mg Oral TID    sodium chloride flush  5-40 mL IntraVENous 2 times per day    levothyroxine  175 mcg Oral Daily    metoprolol tartrate  25 mg Oral BID    pramipexole  0.125 mg Oral TID    enoxaparin  60 mg SubCUTAneous BID    sodium chloride flush  5-40 mL IntraVENous 2 times per day    budesonide  0.5 mg Nebulization BID     HYDROcodone-acetaminophen, 1 tablet, Q6H PRN  glucose, 4 tablet, PRN  dextrose bolus, 125 mL, PRN   Or  dextrose bolus, 250 mL, PRN  glucagon (rDNA), 1 mg, PRN  dextrose, , Continuous PRN  sodium chloride flush, 5-40 mL, PRN  polyethylene glycol, 17 g, Daily PRN  acetaminophen, 650 mg, Q6H PRN   Or  acetaminophen, 650 mg, Q6H PRN  sodium chloride flush, 5-40 mL, PRN  sodium chloride, , PRN         Objective:    /80   Pulse (!) 103   Temp 98.4 °F (36.9 °C) (Oral)   Resp 18   Ht 5' 10\" (1.778 m)   Wt (!) 401 lb 5 oz (182 kg)   SpO2 93%   BMI 57.58 kg/m²     Constitutional:  morbidly obese, NAD, awake, alert, laying on right side on floor  Eyes: no scleral icterus, normal lids, no discharge  ENMT:  Normocephalic, atraumatic, mucosa moist, EOMI  Neck:  trachea midline, no JVD  Lungs:  CTA bilaterally, no audible rhonchi or wheezes noted, respirations unlabored, no retractions  Heart[de-identified]  RRR, distant heart tones, no murmur, rub, or gallop noted during exam  Abd:  Soft, non tender, non distended, bowel sounds present  :  normal male anatomy  MSK: sarcopenia present  Ext:  Moving all extremities, no edema, pulses present, no tenderness to palpation left knee  Skin:  Warm and dry, no rashes on visible skin, old bruise under left arm  Psych: non-anxious affect  Neuro:  PERRL, Alert, grossly nonfocal; following commands    Recent Labs     02/15/23  0438 02/16/23  0425 02/17/23  0852    138 140   K 4.4 3.8 3.5    102 105   CO2 31* 30* 28   BUN 17 13 9   CREATININE 0.8 0.8 0.8   GLUCOSE 126* 149* 130*   CALCIUM 8.5* 8.5* 8.9       Recent Labs     02/15/23  0438 02/16/23  0425   WBC 6.8 6.5   RBC 4.19 4.04   HGB 12.8 12.7   HCT 39.5 38.7   MCV 94.3 95.8   MCH 30.5 31.4   MCHC 32.4 32.8   RDW 12.5 12.9    240   MPV 10.5 10.5           I/O last 3 completed shifts: In: 1727.5 [P.O.:840; IV Piggyback:887.5]  Out: 2400 [Urine:2400]  No intake/output data recorded. Radiology: left knee x-ray ordered    Assessment:    Principal Problem:    Acute encephalopathy  Active Problems:    Acute pain of left knee    Fall from slipping  Resolved Problems:    * No resolved hospital problems. *      Plan:  - telesitter, fall precautions  - left knee x-ray personally reviewed, no fracture seen, official radiology read pending      Critical care time 36 minutes not including procedures. NOTE: This report was transcribed using voice recognition software.  Every effort was made to ensure accuracy; however, inadvertent computerized transcription errors may be present.      Electronically signed by Julian Su DO on 2/17/2023 at 8:53 PM

## 2023-02-19 ENCOUNTER — APPOINTMENT (OUTPATIENT)
Dept: GENERAL RADIOLOGY | Age: 58
DRG: 208 | End: 2023-02-19
Payer: MEDICARE

## 2023-02-19 LAB
ANION GAP SERPL CALCULATED.3IONS-SCNC: 9 MMOL/L (ref 7–16)
BASOPHILS ABSOLUTE: 0.08 E9/L (ref 0–0.2)
BASOPHILS RELATIVE PERCENT: 1.1 % (ref 0–2)
BILIRUBIN URINE: NEGATIVE
BLOOD, URINE: NEGATIVE
BUN BLDV-MCNC: 12 MG/DL (ref 6–20)
CALCIUM SERPL-MCNC: 8.7 MG/DL (ref 8.6–10.2)
CHLORIDE BLD-SCNC: 105 MMOL/L (ref 98–107)
CLARITY: CLEAR
CO2: 28 MMOL/L (ref 22–29)
COLOR: YELLOW
CREAT SERPL-MCNC: 0.8 MG/DL (ref 0.7–1.2)
EOSINOPHILS ABSOLUTE: 0.4 E9/L (ref 0.05–0.5)
EOSINOPHILS RELATIVE PERCENT: 5.7 % (ref 0–6)
GFR SERPL CREATININE-BSD FRML MDRD: >60 ML/MIN/1.73
GLUCOSE BLD-MCNC: 153 MG/DL (ref 74–99)
GLUCOSE URINE: NEGATIVE MG/DL
HCT VFR BLD CALC: 41.5 % (ref 37–54)
HEMOGLOBIN: 13.6 G/DL (ref 12.5–16.5)
IMMATURE GRANULOCYTES #: 0.02 E9/L
IMMATURE GRANULOCYTES %: 0.3 % (ref 0–5)
KETONES, URINE: ABNORMAL MG/DL
LEUKOCYTE ESTERASE, URINE: NEGATIVE
LYMPHOCYTES ABSOLUTE: 0.85 E9/L (ref 1.5–4)
LYMPHOCYTES RELATIVE PERCENT: 12.2 % (ref 20–42)
MCH RBC QN AUTO: 31.3 PG (ref 26–35)
MCHC RBC AUTO-ENTMCNC: 32.8 % (ref 32–34.5)
MCV RBC AUTO: 95.4 FL (ref 80–99.9)
MONOCYTES ABSOLUTE: 0.52 E9/L (ref 0.1–0.95)
MONOCYTES RELATIVE PERCENT: 7.4 % (ref 2–12)
NEUTROPHILS ABSOLUTE: 5.11 E9/L (ref 1.8–7.3)
NEUTROPHILS RELATIVE PERCENT: 73.3 % (ref 43–80)
NITRITE, URINE: NEGATIVE
PDW BLD-RTO: 13.1 FL (ref 11.5–15)
PH UA: 5.5 (ref 5–9)
PLATELET # BLD: 229 E9/L (ref 130–450)
PMV BLD AUTO: 10.9 FL (ref 7–12)
POTASSIUM SERPL-SCNC: 3.8 MMOL/L (ref 3.5–5)
PROCALCITONIN: 0.08 NG/ML (ref 0–0.08)
PROTEIN UA: NEGATIVE MG/DL
RBC # BLD: 4.35 E12/L (ref 3.8–5.8)
SODIUM BLD-SCNC: 142 MMOL/L (ref 132–146)
SPECIFIC GRAVITY UA: >=1.03 (ref 1–1.03)
UROBILINOGEN, URINE: 0.2 E.U./DL
WBC # BLD: 7 E9/L (ref 4.5–11.5)

## 2023-02-19 PROCEDURE — 6370000000 HC RX 637 (ALT 250 FOR IP)

## 2023-02-19 PROCEDURE — 2580000003 HC RX 258: Performed by: STUDENT IN AN ORGANIZED HEALTH CARE EDUCATION/TRAINING PROGRAM

## 2023-02-19 PROCEDURE — 6370000000 HC RX 637 (ALT 250 FOR IP): Performed by: INTERNAL MEDICINE

## 2023-02-19 PROCEDURE — 2060000000 HC ICU INTERMEDIATE R&B

## 2023-02-19 PROCEDURE — 6360000002 HC RX W HCPCS: Performed by: INTERNAL MEDICINE

## 2023-02-19 PROCEDURE — 71045 X-RAY EXAM CHEST 1 VIEW: CPT

## 2023-02-19 PROCEDURE — 81003 URINALYSIS AUTO W/O SCOPE: CPT

## 2023-02-19 PROCEDURE — 36415 COLL VENOUS BLD VENIPUNCTURE: CPT

## 2023-02-19 PROCEDURE — 94640 AIRWAY INHALATION TREATMENT: CPT

## 2023-02-19 PROCEDURE — 94669 MECHANICAL CHEST WALL OSCILL: CPT

## 2023-02-19 PROCEDURE — 85025 COMPLETE CBC W/AUTO DIFF WBC: CPT

## 2023-02-19 PROCEDURE — 80048 BASIC METABOLIC PNL TOTAL CA: CPT

## 2023-02-19 PROCEDURE — 2580000003 HC RX 258: Performed by: INTERNAL MEDICINE

## 2023-02-19 PROCEDURE — 94660 CPAP INITIATION&MGMT: CPT

## 2023-02-19 PROCEDURE — 2700000000 HC OXYGEN THERAPY PER DAY

## 2023-02-19 PROCEDURE — 84145 PROCALCITONIN (PCT): CPT

## 2023-02-19 RX ADMIN — METOPROLOL TARTRATE 25 MG: 25 TABLET, FILM COATED ORAL at 09:33

## 2023-02-19 RX ADMIN — IPRATROPIUM BROMIDE 0.5 MG: 0.5 SOLUTION RESPIRATORY (INHALATION) at 06:17

## 2023-02-19 RX ADMIN — PRAMIPEXOLE DIHYDROCHLORIDE 0.12 MG: 0.12 TABLET ORAL at 21:51

## 2023-02-19 RX ADMIN — ALBUTEROL SULFATE 2.5 MG: 2.5 SOLUTION RESPIRATORY (INHALATION) at 14:27

## 2023-02-19 RX ADMIN — GABAPENTIN 600 MG: 300 CAPSULE ORAL at 21:49

## 2023-02-19 RX ADMIN — IPRATROPIUM BROMIDE 0.5 MG: 0.5 SOLUTION RESPIRATORY (INHALATION) at 01:25

## 2023-02-19 RX ADMIN — Medication 10 ML: at 21:52

## 2023-02-19 RX ADMIN — ENOXAPARIN SODIUM 60 MG: 100 INJECTION SUBCUTANEOUS at 21:49

## 2023-02-19 RX ADMIN — HYDROCODONE BITARTRATE AND ACETAMINOPHEN 1 TABLET: 10; 325 TABLET ORAL at 16:52

## 2023-02-19 RX ADMIN — BACLOFEN 10 MG: 10 TABLET ORAL at 14:16

## 2023-02-19 RX ADMIN — BACLOFEN 10 MG: 10 TABLET ORAL at 09:33

## 2023-02-19 RX ADMIN — ENOXAPARIN SODIUM 60 MG: 100 INJECTION SUBCUTANEOUS at 09:33

## 2023-02-19 RX ADMIN — IPRATROPIUM BROMIDE 0.5 MG: 0.5 SOLUTION RESPIRATORY (INHALATION) at 22:30

## 2023-02-19 RX ADMIN — GABAPENTIN 600 MG: 300 CAPSULE ORAL at 09:32

## 2023-02-19 RX ADMIN — IPRATROPIUM BROMIDE 0.5 MG: 0.5 SOLUTION RESPIRATORY (INHALATION) at 09:59

## 2023-02-19 RX ADMIN — Medication 10 ML: at 09:34

## 2023-02-19 RX ADMIN — ALBUTEROL SULFATE 2.5 MG: 2.5 SOLUTION RESPIRATORY (INHALATION) at 19:03

## 2023-02-19 RX ADMIN — BACLOFEN 10 MG: 10 TABLET ORAL at 21:49

## 2023-02-19 RX ADMIN — PANTOPRAZOLE SODIUM 40 MG: 40 TABLET, DELAYED RELEASE ORAL at 09:33

## 2023-02-19 RX ADMIN — BUDESONIDE 500 MCG: 0.5 SUSPENSION RESPIRATORY (INHALATION) at 06:15

## 2023-02-19 RX ADMIN — LEVOTHYROXINE SODIUM 175 MCG: 0.17 TABLET ORAL at 09:33

## 2023-02-19 RX ADMIN — IPRATROPIUM BROMIDE 0.5 MG: 0.5 SOLUTION RESPIRATORY (INHALATION) at 14:27

## 2023-02-19 RX ADMIN — PRAMIPEXOLE DIHYDROCHLORIDE 0.12 MG: 0.12 TABLET ORAL at 09:32

## 2023-02-19 RX ADMIN — PRAMIPEXOLE DIHYDROCHLORIDE 0.12 MG: 0.12 TABLET ORAL at 14:16

## 2023-02-19 RX ADMIN — ALBUTEROL SULFATE 2.5 MG: 2.5 SOLUTION RESPIRATORY (INHALATION) at 06:14

## 2023-02-19 RX ADMIN — METOPROLOL TARTRATE 25 MG: 25 TABLET, FILM COATED ORAL at 21:49

## 2023-02-19 RX ADMIN — HYDROCODONE BITARTRATE AND ACETAMINOPHEN 1 TABLET: 10; 325 TABLET ORAL at 09:32

## 2023-02-19 RX ADMIN — ALBUTEROL SULFATE 2.5 MG: 2.5 SOLUTION RESPIRATORY (INHALATION) at 22:30

## 2023-02-19 RX ADMIN — IPRATROPIUM BROMIDE 0.5 MG: 0.5 SOLUTION RESPIRATORY (INHALATION) at 19:03

## 2023-02-19 RX ADMIN — ALBUTEROL SULFATE 2.5 MG: 2.5 SOLUTION RESPIRATORY (INHALATION) at 01:25

## 2023-02-19 RX ADMIN — LEVOFLOXACIN 750 MG: 750 TABLET, FILM COATED ORAL at 09:32

## 2023-02-19 RX ADMIN — ALBUTEROL SULFATE 2.5 MG: 2.5 SOLUTION RESPIRATORY (INHALATION) at 09:59

## 2023-02-19 ASSESSMENT — PAIN SCALES - GENERAL
PAINLEVEL_OUTOF10: 0
PAINLEVEL_OUTOF10: 0
PAINLEVEL_OUTOF10: 7

## 2023-02-19 ASSESSMENT — PAIN DESCRIPTION - DESCRIPTORS
DESCRIPTORS: SORE
DESCRIPTORS: ACHING;SORE

## 2023-02-19 ASSESSMENT — PAIN SCALES - WONG BAKER: WONGBAKER_NUMERICALRESPONSE: 0

## 2023-02-19 ASSESSMENT — PAIN DESCRIPTION - ORIENTATION
ORIENTATION: RIGHT;LEFT
ORIENTATION: RIGHT;LEFT;MID

## 2023-02-19 ASSESSMENT — PAIN DESCRIPTION - LOCATION
LOCATION: GENERALIZED
LOCATION: BACK;LEG;GENERALIZED

## 2023-02-19 ASSESSMENT — PAIN DESCRIPTION - PAIN TYPE: TYPE: CHRONIC PAIN

## 2023-02-19 NOTE — PROGRESS NOTES
Department of Internal Medicine  PN    PCP: Presley Saunders DO  Admitting Physician: Dr. Desi Forrester  Consultants:   Date of Service: 2/8/2023    CHIEF COMPLAINT: Altered mental status    HISTORY OF PRESENT ILLNESS:    Patient is 64-year-old male who presented to the ED with altered mental status. HPI obtained from staff and chart review. Patient lives in the basement apparently. Mother stated that she has a room overhead where her son stays. States that she heard her son talking nonsensically and repeating phrases. She confronted him and tried to assist him. Later in the day patient went to the bathroom however did not come out and mother became concerned and called EMS. .  On presentation patient was able to answer questions although confused. patient was intubated due to protection of airway and need for further evaluation in the setting of agitation. 2/9/2023  Patient seen examined on ICU. Patient's family members at the bedside and case discussed. Case discussed with patient's nurse at the bedside. BUN/creatinine 16/1.0 with fasting blood sugar 195. Procalcitonin was 0.04. Drug screen positive for opiates with the patient on Percocet at home. WBC 16.7 hemoglobin 13.6. Urinalysis is unremarkable. Temperature 99 with heart rate 71 blood pressure 132/87. O2 sat 97% with the patient on assist control ventilator with a rate of 20 and 8 of PEEP and FiO2 55%. Urinary output is adequate. 2/10/2023  Patient seen examined in ICU. Patient still intubated and sedated. Patient currently with NG tube feedings. BUN/creatinine 15/1.1 with liver enzymes normal.  WBC is 9.8 with hemoglobin 12.3. Viral respiratory panel was negative. Temperature is 100 degrees with heart rate of 69 blood pressure 134/83. O2 sat 98% with the patient on assist control ventilator with a rate of 20 and 8 of PEEP with FiO2 45%. Urine output about 550 cc a shift.   Chest x-ray this morning shows unchanged atelectasis and/or infiltrate in the right base. 2/11/2023  Patient seen and examined on ICU. Case discussed with patient's nurse at the bedside. BUN/creatinine was 10/0.9 with normal electrolytes. Transaminases normal with WBC with fasting blood sugar 178. CBC is normal.  Temperature 99.7 with heart rate 76 and blood pressure 151/90. O2 sat 97% with patient assist control ventilator with rate of 20 and 8 of PEEP and FiO2 40%. Urinary output is good. Case discussed with intensivist today. We will attempt weaning off ventilator today. 2/12/2023  Patient seen examined on ICU. Patient was extubated yesterday afternoon. Patient is alert oriented very very weak and still very short of breath with any activity. Case discussed with patient nurse at the bedside. BUN/creatinine 11/0.8 with normal electrolytes. Liver enzymes are normal with WBC 7.8 hemoglobin 12.7. Temperature 99.9 with heart rate 73 and blood pressure 137/86. O2 sat 95% with the patient on BiPAP with FiO2 50%. 2/13/2023  Patient seen and examined on ICU. Patient is alert and oriented to person place. The patient is very weak and has not been out of bed yet. Patient stated he tolerated eating pudding this morning without problems. BUN/creatinine 17/0.8 with normal electrolytes. Liver enzymes are normal with a WBC of 7.3 and hemoglobin 12.7. Temperature is 100.4 with heart rate of 71 blood pressure 158/90. O2 sat 95% with the patient on heated high flow nasal cannula with FiO2 55%. Urine output ranges 350-500 cc a shift. Increase activity, consult PT/OT. 2/14/2023  Patient seen examined in the ICU. Patient's family is at the bedside and case discussed. The patient is alert and oriented x3. The patient though still very weak and tired. Patient says he got up yesterday but again is very weak and short of breath with any activity. Patient tolerating alternation of BiPAP and heated high flow nasal cannula. BUN/creatinine 20/0.8 with. Transaminases normal with fasting blood sugar 142. WBC 6.6 with hemoglobin 12.6. Temperature is 99.3 with heart rate of 67 blood pressure ranges 95//95. O2 sat is 93% with the patient on heated high flow nasal cannula with FiO2 55%. Patient currently on BiPAP every 4 hours and at at bedtime alternating with heated high flow nasal cannula. Urinary output is good. 2/15/2023  Patient seen examined on ICU. Patient's wife and daughters at the bedside and case discussed. The patient is awake and alert and oriented x3. Patient's oral intake is poor-fair. Patient still very very weak. BUN/creatinine 17/0.8 with normal electrolytes. Liver enzymes normal with normal transaminase. WBC 6.8 hemoglobin 12.8. Temperature 99.5 with heart rate 75 blood pressure 133/86. O2 sat 92% on-96% on heated high flow nasal cannula with FiO2 40%. Pulmonology, speech therapy note reviewed. 2/16/2023  Patient seen examined in the ICU. Patient is feeling a lot better today. Patient currently on room air is O2 sat at rest was 93%. Patient denies any current chest or abdominal pain. Patient though still very very weak. BUN/creatinine 13/0.8 with normal electrolytes. Fasting blood sugar 149. Liver enzymes normal with WBC 6.5 and hemoglobin 12.7. Temperature is 99 with heart rate of 88 and blood pressure 135/88. O2 sat 93-96% on 6 L nasal cannula. Urine output is good ranging 900-1800 cc a shift. Pulmonology note reviewed. Patient's appetite is poor-fair. Patient most likely be transferred to telemetry floor today. 2/17/2023  Patient seen examined on PCU. Patient again is alert and oriented x3. Patient appetite is slowly improving but patient is eating candy bars and drinking orange crush drink. Patient denies any chest or abdominal pain. Patient is feeling better. Temperature 98.8 with heart rate 83 and blood pressure 104/71. O2 sat 90% on 2 L nasal cannula.   Patient is having about 2 bowel movements today.  Urine output is very good. Patient still very very weak and is unable to ambulate and with PT OT the patient was just standing from a sitting position and then sitting back down. 2/18/2023  Patient seen examined on PCU. Patient tried to get out of bed by himself last night and he stated his foot slid on the floor and he fell on his left knee and side. Patient's daughter is at the bedside again today and case discussed. Reviewed with her today about patient's admission diagnosis and problem. She was appreciative. Patient is alert and oriented x3. Patient understands that he is not to get out of bed by himself now. Temperature 98.1 with heart rate of 74 blood pressure 145/84. O2 sat 96% on 2 L nasal cannula. Whitaker catheter was removed with the patient having an accurate I's and O's at this time. Patient continues to improve and we are waiting for / to find rehab facility. Patient will follow-up outpatient pulmonology after discharge. 2/19/2023  Patient seen examined on PCU. Patient has multiple family members at the bedside and case discussed. Patient complaining of some generalized body achiness along with back pain knee pain hip pain. Patient denies any unusual shortness of breath at rest today. Patient appetite improving. BUN/creatinine is 12/0.8 with normal electrolytes. Fasting blood sugar 153. WBC 7.0 with hemoglobin 13.6. Temperature 97.6 with heart rate of 76 and blood pressure 167/76. O2 sat is increased to 5 L nasal cannula over the last 12 hours. Again inaccurate I's and O's for the last 24 hours!!     PAST MEDICAL Hx:  Past Medical History:   Diagnosis Date    Dermatophytosis 1/12/2023    Hyperlipidemia     Hypertension     Leg pain     Leg swelling 1/12/2023    Lymphedema     Lymphedema of both lower extremities 1/12/2023    MS (multiple sclerosis) ([address]     Multiple sclerosis ([address]     Multiple sclerosis ([address] 1/12/2023    With significant weakness of both legs follows up with [facility]    Spinal stenosis, lumbar     Thyroid disease     Tinea pedis of both feet 1/12/2023       PAST SURGICAL Hx:   Past Surgical History:   Procedure Laterality Date    HERNIA REPAIR      TONSILLECTOMY         FAMILY Hx:  No family history on file. HOME MEDICATIONS:  Prior to Admission medications    Medication Sig Start Date End Date Taking? Authorizing Provider   levothyroxine (SYNTHROID) 175 MCG tablet Take 175 mcg by mouth every morning (before breakfast) 8/27/18  Yes Historical Provider, MD   metFORMIN (GLUCOPHAGE) 500 MG tablet Take 500 mg by mouth 2 times daily 3/4/22  Yes Historical Provider, MD   Monomethyl Fumarate (BAFIERTAM) 95 MG CPDR Take 190 mg by mouth 2 times daily 1/24/23 1/24/24 Yes Historical Provider, MD   ADVAIR -21 MCG/ACT inhaler Inhale 2 puffs into the lungs in the morning and at bedtime 2/8/23   Historical Provider, MD   gabapentin (NEURONTIN) 600 MG tablet Take 1 tablet by mouth in the morning, at noon, and at bedtime. 12/26/22   Historical Provider, MD   HYDROcodone-acetaminophen (NORCO)  MG per tablet Take 1 tablet by mouth every 6 hours as needed.  2/6/23   Historical Provider, MD   torsemide (DEMADEX) 20 MG tablet Take 1 tablet by mouth daily 12/31/22   Historical Provider, MD   valsartan (DIOVAN) 160 MG tablet Take 1 tablet by mouth daily 12/5/22   Historical Provider, MD   miconazole nitrate 2 % OINT Apply topically 2 times daily Apply to the toes in between the toes both feet and both calfs up to the knee twice a day for 1 month 1/19/23   Seda Horne MD   spironolactone (ALDACTONE) 25 MG tablet Take 25 mg by mouth daily    Historical Provider, MD   pramipexole (MIRAPEX) 0.125 MG tablet Take 0.125 mg by mouth 3 times daily    Historical Provider, MD   baclofen (LIORESAL) 20 MG tablet Take 20 mg by mouth 3 times daily    Historical Provider, MD   metoprolol tartrate (LOPRESSOR) 25 MG tablet Take 25 mg by mouth 2 times daily    Historical Provider, MD   vitamin E 1000 UNITS capsule Take 1,000 Units by mouth daily. Historical Provider, MD   loratadine (CLARITIN) 10 MG tablet Take 10 mg by mouth daily. Historical Provider, MD       ALLERGIES:  Bactrim [sulfamethoxazole-trimethoprim], Penicillins, and Sulfa antibiotics    SOCIAL Hx:  Social History     Socioeconomic History    Marital status:      Spouse name: Not on file    Number of children: Not on file    Years of education: Not on file    Highest education level: Not on file   Occupational History    Not on file   Tobacco Use    Smoking status: Every Day     Packs/day: 1.00     Types: Cigarettes    Smokeless tobacco: Never   Substance and Sexual Activity    Alcohol use: Yes     Comment: rarely    Drug use: Not Currently     Types: Marijuana Jaziel Li)     Comment: edible    Sexual activity: Not on file   Other Topics Concern    Not on file   Social History Narrative    ** Merged History Encounter **          Social Determinants of Health     Financial Resource Strain: Not on file   Food Insecurity: Not on file   Transportation Needs: Not on file   Physical Activity: Not on file   Stress: Not on file   Social Connections: Not on file   Intimate Partner Violence: Not on file   Housing Stability: Not on file       ROS: Positive in bold  General:   Denies chills, fatigue, fever, malaise, night sweats or weight loss    Psychological:   Denies anxiety, disorientation or hallucinations    ENT:    Denies epistaxis, headaches, vertigo or visual changes    Cardiovascular:   Denies any chest pain, irregular heartbeats, or palpitations. No paroxysmal nocturnal dyspnea. Respiratory:   Denies shortness of breath, coughing, sputum production, hemoptysis, or wheezing. No orthopnea. Gastrointestinal:   Denies nausea, vomiting, diarrhea, or constipation. Denies any abdominal pain. Denies change in bowel habits or stools.       Genito-Urinary:    Denies any urgency, frequency, hematuria. Voiding without difficulty. Musculoskeletal:   Denies joint pain, joint stiffness, joint swelling or muscle pain    Neurology:    Denies any headache or focal neurological deficits. No weakness or paresthesia. Derm:    Denies any rashes, ulcers, or excoriations. Denies bruising. Extremities:   Denies any lower extremity swelling or edema. PHYSICAL EXAM: Abnormal findings noted  VITALS:  Vitals:    02/19/23 0915   BP: (!) 167/76   Pulse: 76   Resp: 18   Temp: 97.6 °F (36.4 °C)   SpO2: 94%         CONSTITUTIONAL:    Awake, alert, cooperative, no apparent distress, and appears stated age    Patient is intubated and sedated    EYES:    sclera clear, conjunctiva normal    ENT:    Normocephalic, atraumatic,  External ears without lesions. Patient has OG tube and ET tube in place    NECK:    Supple, symmetrical, trachea midline,no JVD    HEMATOLOGIC/LYMPHATICS:    No cervical lymphadenopathy and no supraclavicular lymphadenopathy    LUNGS:    coarse decreased breath sounds to auscultation bilaterally, no wheezes, rhonchi, or rales,     CARDIOVASCULAR:    Normal apical impulse, regular rate and rhythm, normal S1 and S2, no S3 or S4, and no murmur noted    ABDOMEN:    , soft, non-distended, non-tender, morbidly obese    MUSCULOSKELETAL:    There is no redness, warmth, or swelling of the joints. NEUROLOGIC:    Awake, alert, oriented to name, place and time. Patient currently intubated and sedated    SKIN:    No bruising or bleeding. No redness, warmth, or swelling    EXTREMITIES:    Peripheral pulses present. No edema, cyanosis, or swelling.   Patient has bilateral lower extremity edema    LINES/CATHETERS   Patient has right IJ CVC  Whitaker catheter in place    LABORATORY DATA:  CBC with Differential:    Lab Results   Component Value Date/Time    WBC 7.0 02/19/2023 07:50 AM    RBC 4.35 02/19/2023 07:50 AM    HGB 13.6 02/19/2023 07:50 AM    HCT 41.5 02/19/2023 07:50 AM    PLT 229 02/19/2023 07:50 AM    MCV 95.4 02/19/2023 07:50 AM    MCH 31.3 02/19/2023 07:50 AM    MCHC 32.8 02/19/2023 07:50 AM    RDW 13.1 02/19/2023 07:50 AM    SEGSPCT 73 03/12/2014 08:10 AM    LYMPHOPCT 12.2 02/19/2023 07:50 AM    MONOPCT 7.4 02/19/2023 07:50 AM    EOSPCT 2 10/21/2010 10:25 AM    BASOPCT 1.1 02/19/2023 07:50 AM    MONOSABS 0.52 02/19/2023 07:50 AM    LYMPHSABS 0.85 02/19/2023 07:50 AM    EOSABS 0.40 02/19/2023 07:50 AM    BASOSABS 0.08 02/19/2023 07:50 AM     CMP:    Lab Results   Component Value Date/Time     02/19/2023 07:50 AM    K 3.8 02/19/2023 07:50 AM    K 4.1 02/08/2023 02:33 PM     02/19/2023 07:50 AM    CO2 28 02/19/2023 07:50 AM    BUN 12 02/19/2023 07:50 AM    CREATININE 0.8 02/19/2023 07:50 AM    GFRAA >60 07/31/2019 09:44 AM    LABGLOM >60 02/19/2023 07:50 AM    GLUCOSE 153 02/19/2023 07:50 AM    GLUCOSE 99 05/30/2012 08:37 AM    PROT 6.1 02/17/2023 08:52 AM    LABALBU 3.2 02/17/2023 08:52 AM    LABALBU 4.2 05/30/2012 08:37 AM    CALCIUM 8.7 02/19/2023 07:50 AM    BILITOT 0.4 02/17/2023 08:52 AM    ALKPHOS 80 02/17/2023 08:52 AM    AST 19 02/17/2023 08:52 AM    ALT 18 02/17/2023 08:52 AM       ASSESSMENT/PLAN:  Acute hypoxemic and hypercapnic respiratory failure requiring mechanical ventilation  Exacerbation of COPD with current tobacco abuse  Encephalopathy rule out meningitis  Community-acquired pneumonia  History of multiple sclerosis since age 28  Hypertension  Hyperlipidemia  Obesity hypoventilation syndrome  History of alcohol and drug abuse  Morbid obesity with BMI 56.60 kg/m²  Hypothyroidism      Patient presented with altered mental status. Patient became increasingly more confused and agitated. Patient was intubated in the ED. There is concern for meningitis. However lumbar puncture was unable to be performed. Patient placed on acyclovir, meropenem, vancomycin. Patient is immunocompromise in the setting of [employer] for his MS. Cultures ordered.   Consider ID consultation. IR has been consulted for lumbar puncture. Critical care consulted and patient accepted to ICU. Further evaluation and management per critical care. Patient is awaiting transfer to [facility] pending bed availability.    -Home medication reviewed  -N.p.o. with patient intubated  -Lactated Ringer's at 75 cc an hour  -DuoNeb aerosols 4 times daily  -Pulmicort aerosol twice daily  -NG tube feedings    -Patient extubated 2/11 PM    -IV Merrem  -IV vancomycin  -IV Levaquin  -Lovenox 60 mg SQ twice daily  -PT/OT    -Transferred to PCU 2/16  Levaquin 750 mg p.o. daily x6 doses--day 3    Consult /case management for discharge planning to temporary extended-care facility for rehab    Chest x-ray PA and lateral 2/19  Procalcitonin 2/19  UA    -BMP, CBC in a.m.     Zahira Flores DO  10:43 AM  2/19/2023

## 2023-02-20 LAB
ANION GAP SERPL CALCULATED.3IONS-SCNC: 7 MMOL/L (ref 7–16)
BASOPHILS ABSOLUTE: 0.09 E9/L (ref 0–0.2)
BASOPHILS RELATIVE PERCENT: 1.6 % (ref 0–2)
BUN BLDV-MCNC: 13 MG/DL (ref 6–20)
CALCIUM SERPL-MCNC: 8.8 MG/DL (ref 8.6–10.2)
CHLORIDE BLD-SCNC: 103 MMOL/L (ref 98–107)
CO2: 30 MMOL/L (ref 22–29)
CREAT SERPL-MCNC: 0.8 MG/DL (ref 0.7–1.2)
EOSINOPHILS ABSOLUTE: 0.39 E9/L (ref 0.05–0.5)
EOSINOPHILS RELATIVE PERCENT: 6.7 % (ref 0–6)
GFR SERPL CREATININE-BSD FRML MDRD: >60 ML/MIN/1.73
GLUCOSE BLD-MCNC: 156 MG/DL (ref 74–99)
HCT VFR BLD CALC: 43.1 % (ref 37–54)
HEMOGLOBIN: 13.4 G/DL (ref 12.5–16.5)
IMMATURE GRANULOCYTES #: 0.02 E9/L
IMMATURE GRANULOCYTES %: 0.3 % (ref 0–5)
LYMPHOCYTES ABSOLUTE: 0.93 E9/L (ref 1.5–4)
LYMPHOCYTES RELATIVE PERCENT: 16 % (ref 20–42)
MCH RBC QN AUTO: 30.6 PG (ref 26–35)
MCHC RBC AUTO-ENTMCNC: 31.1 % (ref 32–34.5)
MCV RBC AUTO: 98.4 FL (ref 80–99.9)
MONOCYTES ABSOLUTE: 0.43 E9/L (ref 0.1–0.95)
MONOCYTES RELATIVE PERCENT: 7.4 % (ref 2–12)
NEUTROPHILS ABSOLUTE: 3.94 E9/L (ref 1.8–7.3)
NEUTROPHILS RELATIVE PERCENT: 68 % (ref 43–80)
PDW BLD-RTO: 12.9 FL (ref 11.5–15)
PLATELET # BLD: 270 E9/L (ref 130–450)
PMV BLD AUTO: 10.8 FL (ref 7–12)
POTASSIUM SERPL-SCNC: 4 MMOL/L (ref 3.5–5)
RBC # BLD: 4.38 E12/L (ref 3.8–5.8)
SARS-COV-2, NAAT: NOT DETECTED
SODIUM BLD-SCNC: 140 MMOL/L (ref 132–146)
WBC # BLD: 5.8 E9/L (ref 4.5–11.5)

## 2023-02-20 PROCEDURE — 80048 BASIC METABOLIC PNL TOTAL CA: CPT

## 2023-02-20 PROCEDURE — 97530 THERAPEUTIC ACTIVITIES: CPT | Performed by: PHYSICAL THERAPIST

## 2023-02-20 PROCEDURE — 6360000002 HC RX W HCPCS: Performed by: INTERNAL MEDICINE

## 2023-02-20 PROCEDURE — 6370000000 HC RX 637 (ALT 250 FOR IP): Performed by: INTERNAL MEDICINE

## 2023-02-20 PROCEDURE — 94640 AIRWAY INHALATION TREATMENT: CPT

## 2023-02-20 PROCEDURE — 36415 COLL VENOUS BLD VENIPUNCTURE: CPT

## 2023-02-20 PROCEDURE — 2580000003 HC RX 258: Performed by: INTERNAL MEDICINE

## 2023-02-20 PROCEDURE — 6370000000 HC RX 637 (ALT 250 FOR IP)

## 2023-02-20 PROCEDURE — 2060000000 HC ICU INTERMEDIATE R&B

## 2023-02-20 PROCEDURE — 94660 CPAP INITIATION&MGMT: CPT

## 2023-02-20 PROCEDURE — 85025 COMPLETE CBC W/AUTO DIFF WBC: CPT

## 2023-02-20 PROCEDURE — 87635 SARS-COV-2 COVID-19 AMP PRB: CPT

## 2023-02-20 PROCEDURE — 2700000000 HC OXYGEN THERAPY PER DAY

## 2023-02-20 RX ORDER — BUDESONIDE 0.5 MG/2ML
0.5 INHALANT ORAL 2 TIMES DAILY
Qty: 60 EACH | Refills: 3 | DISCHARGE
Start: 2023-02-20

## 2023-02-20 RX ORDER — ENOXAPARIN SODIUM 100 MG/ML
60 INJECTION SUBCUTANEOUS DAILY
DISCHARGE
Start: 2023-02-20

## 2023-02-20 RX ORDER — ALBUTEROL SULFATE 2.5 MG/3ML
2.5 SOLUTION RESPIRATORY (INHALATION) 4 TIMES DAILY
Qty: 120 EACH | Refills: 3 | DISCHARGE
Start: 2023-02-20

## 2023-02-20 RX ORDER — NICOTINE 21 MG/24HR
1 PATCH, TRANSDERMAL 24 HOURS TRANSDERMAL DAILY
Qty: 30 PATCH | Refills: 3 | DISCHARGE
Start: 2023-02-21

## 2023-02-20 RX ORDER — PANTOPRAZOLE SODIUM 40 MG/1
40 TABLET, DELAYED RELEASE ORAL
Qty: 30 TABLET | Refills: 3 | DISCHARGE
Start: 2023-02-21

## 2023-02-20 RX ORDER — LEVOFLOXACIN 750 MG/1
750 TABLET ORAL DAILY
Qty: 2 TABLET | Refills: 0 | DISCHARGE
Start: 2023-02-21 | End: 2023-02-23

## 2023-02-20 RX ORDER — ALBUTEROL SULFATE 2.5 MG/3ML
2.5 SOLUTION RESPIRATORY (INHALATION) EVERY 4 HOURS
Qty: 120 EACH | Refills: 3 | DISCHARGE
Start: 2023-02-20 | End: 2023-02-20 | Stop reason: SDUPTHER

## 2023-02-20 RX ADMIN — ALBUTEROL SULFATE 2.5 MG: 2.5 SOLUTION RESPIRATORY (INHALATION) at 04:36

## 2023-02-20 RX ADMIN — METOPROLOL TARTRATE 25 MG: 25 TABLET, FILM COATED ORAL at 09:25

## 2023-02-20 RX ADMIN — BACLOFEN 10 MG: 10 TABLET ORAL at 14:13

## 2023-02-20 RX ADMIN — IPRATROPIUM BROMIDE 0.5 MG: 0.5 SOLUTION RESPIRATORY (INHALATION) at 14:29

## 2023-02-20 RX ADMIN — IPRATROPIUM BROMIDE 0.5 MG: 0.5 SOLUTION RESPIRATORY (INHALATION) at 04:36

## 2023-02-20 RX ADMIN — PRAMIPEXOLE DIHYDROCHLORIDE 0.12 MG: 0.12 TABLET ORAL at 14:13

## 2023-02-20 RX ADMIN — GABAPENTIN 600 MG: 300 CAPSULE ORAL at 20:14

## 2023-02-20 RX ADMIN — GABAPENTIN 600 MG: 300 CAPSULE ORAL at 09:25

## 2023-02-20 RX ADMIN — BUDESONIDE 500 MCG: 0.5 SUSPENSION RESPIRATORY (INHALATION) at 18:53

## 2023-02-20 RX ADMIN — ALBUTEROL SULFATE 2.5 MG: 2.5 SOLUTION RESPIRATORY (INHALATION) at 09:16

## 2023-02-20 RX ADMIN — PRAMIPEXOLE DIHYDROCHLORIDE 0.12 MG: 0.12 TABLET ORAL at 20:14

## 2023-02-20 RX ADMIN — BUDESONIDE 500 MCG: 0.5 SUSPENSION RESPIRATORY (INHALATION) at 04:36

## 2023-02-20 RX ADMIN — ALBUTEROL SULFATE 2.5 MG: 2.5 SOLUTION RESPIRATORY (INHALATION) at 14:29

## 2023-02-20 RX ADMIN — ALBUTEROL SULFATE 2.5 MG: 2.5 SOLUTION RESPIRATORY (INHALATION) at 18:54

## 2023-02-20 RX ADMIN — IPRATROPIUM BROMIDE 0.5 MG: 0.5 SOLUTION RESPIRATORY (INHALATION) at 18:54

## 2023-02-20 RX ADMIN — BACLOFEN 10 MG: 10 TABLET ORAL at 09:25

## 2023-02-20 RX ADMIN — IPRATROPIUM BROMIDE 0.5 MG: 0.5 SOLUTION RESPIRATORY (INHALATION) at 22:19

## 2023-02-20 RX ADMIN — PRAMIPEXOLE DIHYDROCHLORIDE 0.12 MG: 0.12 TABLET ORAL at 09:32

## 2023-02-20 RX ADMIN — IPRATROPIUM BROMIDE 0.5 MG: 0.5 SOLUTION RESPIRATORY (INHALATION) at 01:36

## 2023-02-20 RX ADMIN — Medication 10 ML: at 09:26

## 2023-02-20 RX ADMIN — ALBUTEROL SULFATE 2.5 MG: 2.5 SOLUTION RESPIRATORY (INHALATION) at 22:19

## 2023-02-20 RX ADMIN — LEVOFLOXACIN 750 MG: 750 TABLET, FILM COATED ORAL at 09:25

## 2023-02-20 RX ADMIN — PANTOPRAZOLE SODIUM 40 MG: 40 TABLET, DELAYED RELEASE ORAL at 05:25

## 2023-02-20 RX ADMIN — BACLOFEN 10 MG: 10 TABLET ORAL at 20:14

## 2023-02-20 RX ADMIN — METOPROLOL TARTRATE 25 MG: 25 TABLET, FILM COATED ORAL at 20:14

## 2023-02-20 RX ADMIN — ENOXAPARIN SODIUM 60 MG: 100 INJECTION SUBCUTANEOUS at 09:25

## 2023-02-20 RX ADMIN — ENOXAPARIN SODIUM 60 MG: 100 INJECTION SUBCUTANEOUS at 20:14

## 2023-02-20 RX ADMIN — HYDROCODONE BITARTRATE AND ACETAMINOPHEN 1 TABLET: 10; 325 TABLET ORAL at 20:19

## 2023-02-20 RX ADMIN — LEVOTHYROXINE SODIUM 175 MCG: 0.17 TABLET ORAL at 05:25

## 2023-02-20 RX ADMIN — IPRATROPIUM BROMIDE 0.5 MG: 0.5 SOLUTION RESPIRATORY (INHALATION) at 09:16

## 2023-02-20 RX ADMIN — ALBUTEROL SULFATE 2.5 MG: 2.5 SOLUTION RESPIRATORY (INHALATION) at 01:36

## 2023-02-20 ASSESSMENT — PAIN SCALES - GENERAL
PAINLEVEL_OUTOF10: 0
PAINLEVEL_OUTOF10: 1
PAINLEVEL_OUTOF10: 8

## 2023-02-20 ASSESSMENT — PAIN DESCRIPTION - DESCRIPTORS: DESCRIPTORS: ACHING

## 2023-02-20 ASSESSMENT — PAIN SCALES - WONG BAKER
WONGBAKER_NUMERICALRESPONSE: 0

## 2023-02-20 ASSESSMENT — PAIN DESCRIPTION - LOCATION: LOCATION: BACK

## 2023-02-20 NOTE — CARE COORDINATION
2/20/23 1710 CM note: COVID (-) 2/20/23. 3L nc. Discharge order noted. Per Priscilla, [address] [place] [address] liaison, pts Lida Turner is good till the end of the day today. NEED STEVEN SIGNED. HENS completed. Ambulance transport arranged with [facility] for  at 7:30. Notified pt, pts daughter Jayy Rahman, pts RN Mikel Gaming, charge nurse Radha, and [facility] liaison Madonna of  time.  Electronically signed by Jillian Rhodes RN on 2/20/2023 at 5:16 PM

## 2023-02-20 NOTE — PROGRESS NOTES
Comprehensive Nutrition Assessment    Type and Reason for Visit:  Reassess    Nutrition Recommendations/Plan:   Continue current diet regimen & monitor     Malnutrition Assessment:  Malnutrition Status: At risk for malnutrition (Comment) (02/09/23 0800)    Context:  Chronic Illness     Findings of the 6 clinical characteristics of malnutrition:  Energy Intake:  Unable to assess (vent)  Weight Loss:  Unable to assess     Body Fat Loss:  No significant body fat loss     Muscle Mass Loss:  No significant muscle mass loss    Fluid Accumulation:  No significant fluid accumulation     Strength:  Not Performed    Nutrition Assessment:    Pt adm d/t AMS w/ resp failure, COPD exacerbation/CAP, encephalopathy r/o - meningitis. Hx poly substance abuse, MS. Note pt extubated on 2/11. Pt tolerating Regular diet w/ poor PO intake < 25%. Continue current diet regimen & monitor. Nutrition Related Findings:    A/O x4, note forgetful, obeses/round/distended/nontender abd, +BS, I/O +3.8L, non-pitting, +1 /+2pitting edema. Wound Type: None       Current Nutrition Intake & Therapies:    Average Meal Intake: 1-25%  Average Supplements Intake: Unable to assess  ADULT ORAL NUTRITION SUPPLEMENT; Lunch, Dinner; Other Oral Supplement; Gelatein 20  ADULT DIET; Regular    Anthropometric Measures:  Height: 5' 10\" (177.8 cm)  Ideal Body Weight (IBW): 166 lbs (75 kg)    Admission Body Weight: 179 lb (81.2 kg)  Current Body Weight: 401 lb (181.9 kg) (2/16 bed), 253 % IBW.     Current BMI (kg/m2): 57.5  Usual Body Weight:  (lack measured wt hx per EMR)     Weight Adjustment For: No Adjustment    BMI Categories: Obese Class 3 (BMI 40.0 or greater)    Estimated Daily Nutrient Needs:  Energy Requirements Based On: Formula  Weight Used for Energy Requirements: Admission  Energy (kcal/day): 1695-7911  (80%)  Weight Used for Protein Requirements: Ideal  Protein (g/day): 150-1700  Method Used for Fluid Requirements: 1 ml/kcal  Fluid (ml/day): 4066-3928    Nutrition Diagnosis:   Inadequate oral intake related to impaired respiratory function as evidenced by intake 0-25%    Nutrition Interventions:   Food and/or Nutrient Delivery: Continue Current Diet  Nutrition Education/Counseling: No recommendation at this time  Coordination of Nutrition Care: Continue to monitor while inpatient     Goals:     Goals: PO intake 50% or greater  Specify Other Goals: Nutrition progression    Nutrition Monitoring and Evaluation:   Behavioral-Environmental Outcomes: None Identified  Food/Nutrient Intake Outcomes: Food and Nutrient Intake, Supplement Intake  Physical Signs/Symptoms Outcomes: Biochemical Data, Chewing or Swallowing, GI Status, Fluid Status or Edema, Weight, Skin, Nutrition Focused Physical Findings    Discharge Planning:     Too soon to determine     Merle Coe RD  Contact: [phone]

## 2023-02-20 NOTE — PROGRESS NOTES
Department of Internal Medicine  PN    PCP: Hiral Cano DO  Admitting Physician: Dr. Sharon Hayes  Consultants:   Date of Service: 2/8/2023    CHIEF COMPLAINT: Altered mental status    HISTORY OF PRESENT ILLNESS:    Patient is 50-year-old male who presented to the ED with altered mental status. HPI obtained from staff and chart review. Patient lives in the basement apparently. Mother stated that she has a room overhead where her son stays. States that she heard her son talking nonsensically and repeating phrases. She confronted him and tried to assist him. Later in the day patient went to the bathroom however did not come out and mother became concerned and called EMS. .  On presentation patient was able to answer questions although confused. patient was intubated due to protection of airway and need for further evaluation in the setting of agitation. 2/9/2023  Patient seen examined on ICU. Patient's family members at the bedside and case discussed. Case discussed with patient's nurse at the bedside. BUN/creatinine 16/1.0 with fasting blood sugar 195. Procalcitonin was 0.04. Drug screen positive for opiates with the patient on Percocet at home. WBC 16.7 hemoglobin 13.6. Urinalysis is unremarkable. Temperature 99 with heart rate 71 blood pressure 132/87. O2 sat 97% with the patient on assist control ventilator with a rate of 20 and 8 of PEEP and FiO2 55%. Urinary output is adequate. 2/10/2023  Patient seen examined in ICU. Patient still intubated and sedated. Patient currently with NG tube feedings. BUN/creatinine 15/1.1 with liver enzymes normal.  WBC is 9.8 with hemoglobin 12.3. Viral respiratory panel was negative. Temperature is 100 degrees with heart rate of 69 blood pressure 134/83. O2 sat 98% with the patient on assist control ventilator with a rate of 20 and 8 of PEEP with FiO2 45%. Urine output about 550 cc a shift.   Chest x-ray this morning shows unchanged atelectasis and/or infiltrate in the right base. 2/11/2023  Patient seen and examined on ICU. Case discussed with patient's nurse at the bedside. BUN/creatinine was 10/0.9 with normal electrolytes. Transaminases normal with WBC with fasting blood sugar 178. CBC is normal.  Temperature 99.7 with heart rate 76 and blood pressure 151/90. O2 sat 97% with patient assist control ventilator with rate of 20 and 8 of PEEP and FiO2 40%. Urinary output is good. Case discussed with intensivist today. We will attempt weaning off ventilator today. 2/12/2023  Patient seen examined on ICU. Patient was extubated yesterday afternoon. Patient is alert oriented very very weak and still very short of breath with any activity. Case discussed with patient nurse at the bedside. BUN/creatinine 11/0.8 with normal electrolytes. Liver enzymes are normal with WBC 7.8 hemoglobin 12.7. Temperature 99.9 with heart rate 73 and blood pressure 137/86. O2 sat 95% with the patient on BiPAP with FiO2 50%. 2/13/2023  Patient seen and examined on ICU. Patient is alert and oriented to person place. The patient is very weak and has not been out of bed yet. Patient stated he tolerated eating pudding this morning without problems. BUN/creatinine 17/0.8 with normal electrolytes. Liver enzymes are normal with a WBC of 7.3 and hemoglobin 12.7. Temperature is 100.4 with heart rate of 71 blood pressure 158/90. O2 sat 95% with the patient on heated high flow nasal cannula with FiO2 55%. Urine output ranges 350-500 cc a shift. Increase activity, consult PT/OT. 2/14/2023  Patient seen examined in the ICU. Patient's family is at the bedside and case discussed. The patient is alert and oriented x3. The patient though still very weak and tired. Patient says he got up yesterday but again is very weak and short of breath with any activity. Patient tolerating alternation of BiPAP and heated high flow nasal cannula. BUN/creatinine 20/0.8 with. Transaminases normal with fasting blood sugar 142. WBC 6.6 with hemoglobin 12.6. Temperature is 99.3 with heart rate of 67 blood pressure ranges 95//95. O2 sat is 93% with the patient on heated high flow nasal cannula with FiO2 55%. Patient currently on BiPAP every 4 hours and at at bedtime alternating with heated high flow nasal cannula. Urinary output is good. 2/15/2023  Patient seen examined on ICU. Patient's wife and daughters at the bedside and case discussed. The patient is awake and alert and oriented x3. Patient's oral intake is poor-fair. Patient still very very weak. BUN/creatinine 17/0.8 with normal electrolytes. Liver enzymes normal with normal transaminase. WBC 6.8 hemoglobin 12.8. Temperature 99.5 with heart rate 75 blood pressure 133/86. O2 sat 92% on-96% on heated high flow nasal cannula with FiO2 40%. Pulmonology, speech therapy note reviewed. 2/16/2023  Patient seen examined in the ICU. Patient is feeling a lot better today. Patient currently on room air is O2 sat at rest was 93%. Patient denies any current chest or abdominal pain. Patient though still very very weak. BUN/creatinine 13/0.8 with normal electrolytes. Fasting blood sugar 149. Liver enzymes normal with WBC 6.5 and hemoglobin 12.7. Temperature is 99 with heart rate of 88 and blood pressure 135/88. O2 sat 93-96% on 6 L nasal cannula. Urine output is good ranging 900-1800 cc a shift. Pulmonology note reviewed. Patient's appetite is poor-fair. Patient most likely be transferred to telemetry floor today. 2/17/2023  Patient seen examined on PCU. Patient again is alert and oriented x3. Patient appetite is slowly improving but patient is eating candy bars and drinking orange crush drink. Patient denies any chest or abdominal pain. Patient is feeling better. Temperature 98.8 with heart rate 83 and blood pressure 104/71. O2 sat 90% on 2 L nasal cannula.   Patient is having about 2 bowel movements today.  Urine output is very good. Patient still very very weak and is unable to ambulate and with PT OT the patient was just standing from a sitting position and then sitting back down. 2/18/2023  Patient seen examined on PCU. Patient tried to get out of bed by himself last night and he stated his foot slid on the floor and he fell on his left knee and side. Patient's daughter is at the bedside again today and case discussed. Reviewed with her today about patient's admission diagnosis and problem. She was appreciative. Patient is alert and oriented x3. Patient understands that he is not to get out of bed by himself now. Temperature 98.1 with heart rate of 74 blood pressure 145/84. O2 sat 96% on 2 L nasal cannula. Whitaker catheter was removed with the patient having an accurate I's and O's at this time. Patient continues to improve and we are waiting for / to find rehab facility. Patient will follow-up outpatient pulmonology after discharge. 2/19/2023  Patient seen examined on PCU. Patient has multiple family members at the bedside and case discussed. Patient complaining of some generalized body achiness along with back pain knee pain hip pain. Patient denies any unusual shortness of breath at rest today. Patient appetite improving. BUN/creatinine is 12/0.8 with normal electrolytes. Fasting blood sugar 153. WBC 7.0 with hemoglobin 13.6. Temperature 97.6 with heart rate of 76 and blood pressure 167/76. O2 sat is increased to 5 L nasal cannula over the last 12 hours. Again inaccurate I's and O's for the last 24 hours!!    2/20/2023  Patient seen examined on PCU. Patient denies any problems last night or today. Patient still very weak. Patient leg strength is extremely poor. He denies any chest or abdominal pain. There is no dizziness or nausea vomiting. BUN/creatinine is 13/0.8 normal electrolytes with CO2 of 30. Fasting blood sugar 156.   WBC 5.8 hemoglobin 13.4.  Procalcitonin yesterday was 0.08. Urinalysis yesterday did not show any signs of infection. Temperature is 97.7 with heart rate of 71 blood pressure 139/72. PAST MEDICAL Hx:  Past Medical History:   Diagnosis Date    Dermatophytosis 1/12/2023    Hyperlipidemia     Hypertension     Leg pain     Leg swelling 1/12/2023    Lymphedema     Lymphedema of both lower extremities 1/12/2023    MS (multiple sclerosis) ([facility] [address]     Multiple sclerosis ([facility] [address]     Multiple sclerosis ([facility] [address] 1/12/2023    With significant weakness of both legs follows up with [facility]    Spinal stenosis, lumbar     Thyroid disease     Tinea pedis of both feet 1/12/2023       PAST SURGICAL Hx:   Past Surgical History:   Procedure Laterality Date    HERNIA REPAIR      TONSILLECTOMY         FAMILY Hx:  No family history on file. HOME MEDICATIONS:  Prior to Admission medications    Medication Sig Start Date End Date Taking? Authorizing Provider   levothyroxine (SYNTHROID) 175 MCG tablet Take 175 mcg by mouth every morning (before breakfast) 8/27/18  Yes Historical Provider, MD   metFORMIN (GLUCOPHAGE) 500 MG tablet Take 500 mg by mouth 2 times daily 3/4/22  Yes Historical Provider, MD   Monomethyl Fumarate (BAFIERTAM) 95 MG CPDR Take 190 mg by mouth 2 times daily 1/24/23 1/24/24 Yes Historical Provider, MD   ADVAIR -21 MCG/ACT inhaler Inhale 2 puffs into the lungs in the morning and at bedtime 2/8/23   Historical Provider, MD   gabapentin (NEURONTIN) 600 MG tablet Take 1 tablet by mouth in the morning, at noon, and at bedtime. 12/26/22   Historical Provider, MD   HYDROcodone-acetaminophen (NORCO)  MG per tablet Take 1 tablet by mouth every 6 hours as needed.  2/6/23   Historical Provider, MD   torsemide (DEMADEX) 20 MG tablet Take 1 tablet by mouth daily 12/31/22   Historical Provider, MD   valsartan (DIOVAN) 160 MG tablet Take 1 tablet by mouth daily 12/5/22   Historical Provider, MD   miconazole nitrate 2 % OINT Apply topically 2 times daily Apply to the toes in between the toes both feet and both calfs up to the knee twice a day for 1 month 1/19/23   Leatha Orozco MD   spironolactone (ALDACTONE) 25 MG tablet Take 25 mg by mouth daily    Historical Provider, MD   pramipexole (MIRAPEX) 0.125 MG tablet Take 0.125 mg by mouth 3 times daily    Historical Provider, MD   baclofen (LIORESAL) 20 MG tablet Take 20 mg by mouth 3 times daily    Historical Provider, MD   metoprolol tartrate (LOPRESSOR) 25 MG tablet Take 25 mg by mouth 2 times daily    Historical Provider, MD   vitamin E 1000 UNITS capsule Take 1,000 Units by mouth daily. Historical Provider, MD   loratadine (CLARITIN) 10 MG tablet Take 10 mg by mouth daily.       Historical Provider, MD       ALLERGIES:  Bactrim [sulfamethoxazole-trimethoprim], Penicillins, and Sulfa antibiotics    SOCIAL Hx:  Social History     Socioeconomic History    Marital status:      Spouse name: Not on file    Number of children: Not on file    Years of education: Not on file    Highest education level: Not on file   Occupational History    Not on file   Tobacco Use    Smoking status: Every Day     Packs/day: 1.00     Types: Cigarettes    Smokeless tobacco: Never   Substance and Sexual Activity    Alcohol use: Yes     Comment: rarely    Drug use: Not Currently     Types: Marijuana [employer] [place])     Comment: edible    Sexual activity: Not on file   Other Topics Concern    Not on file   Social History Narrative    ** Merged History Encounter **          Social Determinants of Health     Financial Resource Strain: Not on file   Food Insecurity: Not on file   Transportation Needs: Not on file   Physical Activity: Not on file   Stress: Not on file   Social Connections: Not on file   Intimate Partner Violence: Not on file   Housing Stability: Not on file       ROS: Positive in bold  General:   Denies chills, fatigue, fever, malaise, night sweats or weight loss    Psychological:   Denies anxiety, disorientation or hallucinations    ENT:    Denies epistaxis, headaches, vertigo or visual changes    Cardiovascular:   Denies any chest pain, irregular heartbeats, or palpitations. No paroxysmal nocturnal dyspnea. Respiratory:   Denies shortness of breath, coughing, sputum production, hemoptysis, or wheezing. No orthopnea. Gastrointestinal:   Denies nausea, vomiting, diarrhea, or constipation. Denies any abdominal pain. Denies change in bowel habits or stools. Genito-Urinary:    Denies any urgency, frequency, hematuria. Voiding without difficulty. Musculoskeletal:   Denies joint pain, joint stiffness, joint swelling or muscle pain    Neurology:    Denies any headache or focal neurological deficits. No weakness or paresthesia. Derm:    Denies any rashes, ulcers, or excoriations. Denies bruising. Extremities:   Denies any lower extremity swelling or edema. PHYSICAL EXAM: Abnormal findings noted  VITALS:  Vitals:    02/20/23 0723   BP: 139/72   Pulse: 71   Resp: 18   Temp: 97.7 °F (36.5 °C)   SpO2: 100%         CONSTITUTIONAL:    Awake, alert, cooperative, no apparent distress, and appears stated age    Patient is intubated and sedated    EYES:    sclera clear, conjunctiva normal    ENT:    Normocephalic, atraumatic,  External ears without lesions. Patient has OG tube and ET tube in place    NECK:    Supple, symmetrical, trachea midline,no JVD    HEMATOLOGIC/LYMPHATICS:    No cervical lymphadenopathy and no supraclavicular lymphadenopathy    LUNGS:    coarse decreased breath sounds to auscultation bilaterally, no wheezes, rhonchi, or rales,     CARDIOVASCULAR:    Normal apical impulse, regular rate and rhythm, normal S1 and S2, no S3 or S4, and no murmur noted    ABDOMEN:    , soft, non-distended, non-tender, morbidly obese    MUSCULOSKELETAL:    There is no redness, warmth, or swelling of the joints. NEUROLOGIC:    Awake, alert, oriented to name, place and time.      Patient currently intubated and sedated    SKIN:    No bruising or bleeding. No redness, warmth, or swelling    EXTREMITIES:    Peripheral pulses present. No edema, cyanosis, or swelling.   Patient has bilateral lower extremity edema    LINES/CATHETERS   Patient has right IJ CVC  Whitaker catheter in place    LABORATORY DATA:  CBC with Differential:    Lab Results   Component Value Date/Time    WBC 5.8 02/20/2023 07:39 AM    RBC 4.38 02/20/2023 07:39 AM    HGB 13.4 02/20/2023 07:39 AM    HCT 43.1 02/20/2023 07:39 AM     02/20/2023 07:39 AM    MCV 98.4 02/20/2023 07:39 AM    MCH 30.6 02/20/2023 07:39 AM    MCHC 31.1 02/20/2023 07:39 AM    RDW 12.9 02/20/2023 07:39 AM    SEGSPCT 73 03/12/2014 08:10 AM    LYMPHOPCT 16.0 02/20/2023 07:39 AM    MONOPCT 7.4 02/20/2023 07:39 AM    EOSPCT 2 10/21/2010 10:25 AM    BASOPCT 1.6 02/20/2023 07:39 AM    MONOSABS 0.43 02/20/2023 07:39 AM    LYMPHSABS 0.93 02/20/2023 07:39 AM    EOSABS 0.39 02/20/2023 07:39 AM    BASOSABS 0.09 02/20/2023 07:39 AM     CMP:    Lab Results   Component Value Date/Time     02/20/2023 07:39 AM    K 4.0 02/20/2023 07:39 AM    K 4.1 02/08/2023 02:33 PM     02/20/2023 07:39 AM    CO2 30 02/20/2023 07:39 AM    BUN 13 02/20/2023 07:39 AM    CREATININE 0.8 02/20/2023 07:39 AM    GFRAA >60 07/31/2019 09:44 AM    LABGLOM >60 02/20/2023 07:39 AM    GLUCOSE 156 02/20/2023 07:39 AM    GLUCOSE 99 05/30/2012 08:37 AM    PROT 6.1 02/17/2023 08:52 AM    LABALBU 3.2 02/17/2023 08:52 AM    LABALBU 4.2 05/30/2012 08:37 AM    CALCIUM 8.8 02/20/2023 07:39 AM    BILITOT 0.4 02/17/2023 08:52 AM    ALKPHOS 80 02/17/2023 08:52 AM    AST 19 02/17/2023 08:52 AM    ALT 18 02/17/2023 08:52 AM       ASSESSMENT/PLAN:  Acute hypoxemic and hypercapnic respiratory failure requiring mechanical ventilation  Exacerbation of COPD with current tobacco abuse  Encephalopathy rule out meningitis  Community-acquired pneumonia  History of multiple sclerosis since age 31  Hypertension  Hyperlipidemia  Obesity hypoventilation syndrome  History of alcohol and drug abuse  Morbid obesity with BMI 56.60 kg/m²  Hypothyroidism      Patient presented with altered mental status.  Patient became increasingly more confused and agitated.  Patient was intubated in the ED.  There is concern for meningitis.  However lumbar puncture was unable to be performed.  Patient placed on acyclovir, meropenem, vancomycin.  Patient is immunocompromise in the setting of [employer] for his MS.  Cultures ordered.  Consider ID consultation.  IR has been consulted for lumbar puncture.  Critical care consulted and patient accepted to ICU.  Further evaluation and management per critical care.  Patient is awaiting transfer to [facility] pending bed availability.    -Home medication reviewed  -N.p.o. with patient intubated  -Lactated Ringer's at 75 cc an hour  -DuoNeb aerosols 4 times daily  -Pulmicort aerosol twice daily  -NG tube feedings    -Patient extubated 2/11 PM    -IV Merrem  -IV vancomycin  -IV Levaquin  -Lovenox 60 mg SQ twice daily  -PT/OT    -Transferred to PCU 2/16  Levaquin 750 mg p.o. daily x6 doses--day 3    Consult /case management for discharge planning to temporary extended-care facility for rehab    Chest x-ray PA and lateral 2/19  Procalcitonin 2/19-0.08  UA-unremarkable except for trace ketone    -BMP, CBC in a.mFrances Patel DO  9:05 AM  2/20/2023

## 2023-02-20 NOTE — PLAN OF CARE
Problem: Discharge Planning  Goal: Discharge to home or other facility with appropriate resources  Outcome: Progressing     Problem: Pain  Goal: Verbalizes/displays adequate comfort level or baseline comfort level  Outcome: Progressing     Problem: Skin/Tissue Integrity  Goal: Absence of new skin breakdown  Description: 1. Monitor for areas of redness and/or skin breakdown  2. Assess vascular access sites hourly  3. Every 4-6 hours minimum:  Change oxygen saturation probe site  4. Every 4-6 hours:  If on nasal continuous positive airway pressure, respiratory therapy assess nares and determine need for appliance change or resting period.   Outcome: Progressing     Problem: Nutrition Deficit:  Goal: Optimize nutritional status  Outcome: Progressing     Problem: Respiratory - Adult  Goal: Achieves optimal ventilation and oxygenation  Outcome: Progressing     Problem: ABCDS Injury Assessment  Goal: Absence of physical injury  Outcome: Progressing     Problem: Safety - Adult  Goal: Free from fall injury  Outcome: Progressing

## 2023-02-20 NOTE — PROGRESS NOTES
Physical Therapy  Physical Therapy Treatment Note/Plan of Care    Room #:  0515/0515-01  Patient Name: Annette Harrison  YOB: 1965  MRN: [id number]    Date of Service: 2/20/2023     Tentative placement recommendation: Subacute Rehab  Equipment recommendation: To be determined      Evaluating Physical Therapist: Varinder Mejia PT  #[id number]      Specific Provider Orders/Date/Referring Provider :  02/13/23 1145    PT eval and treat  Start:  02/13/23 1145,   End:  02/13/23 1145,   ONE TIME,   Standing Count:  1 Occurrences,   Mikayla Brice MD     Admitting Diagnosis:   Encephalitis [G04.90]  Acute encephalopathy [G93.40]     Admitted with    altered mental status , hx ms  right lung atelectasis  Intubated 2/8-2/11  Surgery: none  Visit Diagnoses         Codes    Encephalitis    -  Primary G04.90            Patient Active Problem List   Diagnosis    Leg swelling    Lymphedema of both lower extremities    Tinea pedis of both feet    Dermatophytosis    Multiple sclerosis ([facility] [address]    Acute encephalopathy    Acute pain of left knee    Fall from slipping        ASSESSMENT of Current Deficits Patient exhibits decreased strength, balance, endurance, range of motion, and coordination impairing functional mobility, transfers, gait , gait distance, and tolerance to activity are barriers to d/c and require skilled intervention to address concerns listed above to increase safety and independence at discharge. Pt able to sit EOB, max assist x2 as well as stand with MaxA x 2 and was able to side step along the bed and sit up in chair. Pt with impaired gait prior to illness d/t weakness in Right lower extremity \"I drag it\". Pt required bed pan at the end of the session and needed MaxA x 2 to stand. Overall debility, recent intubation and pre morbid mobility issues impact  functional independence.        PHYSICAL THERAPY  PLAN OF CARE       Physical therapy plan of care is established based on physician order, patient diagnosis and clinical assessment    Current Treatment Recommendations:    -Bed Mobility: Lower extremity exercises , Upper extremity exercises , and Trunk control activities   -Sitting Balance: Incorporate reaching activities to activate trunk muscles , Hands on support to maintain midline , Facilitate active trunk muscle engagement , Facilitate postural control in all planes , and Engage in core activities to allow for movement within base of support   -Standing Balance: Perform strengthening exercises in standing to promote motor control with or without upper extremity support  and Instruct patient on adequate base of support to maintain balance  -Transfers: Provide instruction on proper hand and foot position for adequate transfer of weight onto lower extremities and use of gait device if needed, Cues for hand placement, technique and safety. Provide stabilization to prevent fall , Support transfer of weight on to lower extremities, and Assist with extension of knees trunk and hip to accept weight transfer     PT long term treatment goals are located in below grid    Patient and or family understand(s) diagnosis, prognosis, and plan of care. Frequency of treatments: Patient will be seen  daily.          Prior Level of Function: Patient ambulated independently    Rehab Potential: good   for baseline    Past medical history:   Past Medical History:   Diagnosis Date    Dermatophytosis 1/12/2023    Hyperlipidemia     Hypertension     Leg pain     Leg swelling 1/12/2023    Lymphedema     Lymphedema of both lower extremities 1/12/2023    MS (multiple sclerosis) ([address]     Multiple sclerosis ([address]     Multiple sclerosis ([address] 1/12/2023    With significant weakness of both legs follows up with [facility]    Spinal stenosis, lumbar     Thyroid disease     Tinea pedis of both feet 1/12/2023     Past Surgical History:   Procedure Laterality Date    HERNIA REPAIR      TONSILLECTOMY SUBJECTIVE:    Precautions: titrate/wean oxygen and pep flutter valve, falls, alarm, and O2 , air vo    Social history: Patient lives with mother and resides  in basement with one flight of steps       Equipment owned: unknown,       AM-PAC Basic Mobility        AM-PAC Basic Mobility - Inpatient   How much help is needed turning from your back to your side while in a flat bed without using bedrails?: A Lot  How much help is needed moving from lying on your back to sitting on the side of a flat bed without using bedrails?: A Lot  How much help is needed moving to and from a bed to a chair?: Total  How much help is needed standing up from a chair using your arms?: Total  How much help is needed walking in hospital room?: Total  How much help is needed climbing 3-5 steps with a railing?: Total  AM-PAC Inpatient Mobility Raw Score : 8  AM-PAC Inpatient T-Scale Score : 28.52  Mobility Inpatient CMS 0-100% Score: 86.62  Mobility Inpatient CMS G-Code Modifier : CM    Nursing cleared patient for PT treatment. OBJECTIVE;   Initial Evaluation  Date: 2/13/2023 Treatment Date:    2/20/2023   Short Term/ Long Term   Goals   Was pt agreeable to Eval/treatment? Yes Yes To be met in 5 days   Pain level   0/10    5/10  back    Bed Mobility    Rolling: Moderate assist of 1    Supine to sit: Maximal assist of 1    Sit to supine: Dependent of  2    Scooting: Maximal assist of 1   Rolling: Maximal assist of 1   Supine to sit: Maximal assist of 1   Sit to supine: Not assessed patient in chair   Scooting: Moderate assist of 1    Rolling: Minimal assist of 1    Supine to sit: Minimal assist of 1    Sit to supine: Minimal assist of 1    Scooting: Minimal assist of 1     Transfers Sit to stand: Not assessed    Sit to stand: Maximal assist of  2 x 4 reps    Sit to stand:  Moderate assist of 1     Ambulation    not assessed  not assessed, unable      10 feet using  wheeled walker with Moderate assist of  2    ROM impaired   Increase range of motion 10% of affected joints    Strength BUE:   3/5  RLE:  1/5  LLE:  2/5  Increase strength in affected mm groups by 1/3 grade   Balance Sitting EOB:  poor multiplane instability with poor sense of upright  Dynamic Standing:  not assessed   Sitting EOB: fair -, instability when sitting EOB  Dynamic Standing: not assessed    Sitting EOB:  fair        Patient is Alert & Oriented x person, place, and time and follows one step directions    Sensation:  Patient  denies numbness/tingling   Edema:  yes bilateral lower extremities and right hand   Endurance: fair  -    Vitals:  4 liters nasal cannula   Blood Pressure at rest  Blood Pressure during session    Heart Rate at rest   Heart Rate during session     SPO2 at rest %  SPO2 during session 89-96%     Patient education  Patient educated on role of Physical Therapy, risks of immobility, safety and plan of care, importance of positional changes for oxygen exchange,  importance of mobility while in hospital , safety , and positioning for skin integrity and comfort     Patient response to education:   Pt verbalized understanding Pt demonstrated skill Pt requires further education in this area   Yes Partial Yes      Treatment:  Patient practiced and was instructed/facilitated in the following treatment: Patient   Sat edge of bed 15 minutes with Minimal assist of 1 to increase dynamic sitting balance and activity tolerance. Pt performed bed mobility, transfer training, side stepped along the bed. Therapeutic Exercises:  not performed     At end of session, patient in bed with  call light and phone within reach,  all lines and tubes intact, nursing notified. Patient would benefit from continued skilled Physical Therapy to improve functional independence and quality of life.          Patient's/ family goals   none stated    Time in    1015      1119    Time out  1104      1133    Total Treatment Time  63 minutes    CPT codes:  Therapeutic activities () 63 minutes  4 unit(s)    Deo Aguirre, PT

## 2023-02-21 VITALS
BODY MASS INDEX: 45.1 KG/M2 | OXYGEN SATURATION: 97 % | TEMPERATURE: 97.6 F | RESPIRATION RATE: 20 BRPM | DIASTOLIC BLOOD PRESSURE: 63 MMHG | SYSTOLIC BLOOD PRESSURE: 136 MMHG | WEIGHT: 315 LBS | HEIGHT: 70 IN | HEART RATE: 82 BPM

## 2023-02-21 PROCEDURE — 94660 CPAP INITIATION&MGMT: CPT

## 2023-02-21 PROCEDURE — 2700000000 HC OXYGEN THERAPY PER DAY

## 2023-02-21 ASSESSMENT — PAIN SCALES - GENERAL: PAINLEVEL_OUTOF10: 8

## 2023-02-21 NOTE — PROGRESS NOTES
This writer called [facility] to update her that [facility] here to transport her dad to [facility].  Electronically signed by Chaya Perdomo RN on 2/21/2023 at 3:45 AM

## 2023-02-28 ENCOUNTER — OUTSIDE SERVICES (OUTPATIENT)
Dept: PRIMARY CARE CLINIC | Age: 58
End: 2023-02-28
Payer: MEDICARE

## 2023-02-28 DIAGNOSIS — G35 MULTIPLE SCLEROSIS (HCC): ICD-10-CM

## 2023-02-28 DIAGNOSIS — E78.2 MIXED HYPERLIPIDEMIA: ICD-10-CM

## 2023-02-28 DIAGNOSIS — E11.9 TYPE 2 DIABETES MELLITUS WITHOUT COMPLICATION, UNSPECIFIED WHETHER LONG TERM INSULIN USE (HCC): ICD-10-CM

## 2023-02-28 DIAGNOSIS — J96.02 ACUTE RESPIRATORY FAILURE WITH HYPERCAPNIA (HCC): ICD-10-CM

## 2023-02-28 DIAGNOSIS — J44.9 CHRONIC OBSTRUCTIVE PULMONARY DISEASE, UNSPECIFIED COPD TYPE (HCC): ICD-10-CM

## 2023-02-28 DIAGNOSIS — E66.2 MORBID (SEVERE) OBESITY WITH ALVEOLAR HYPOVENTILATION (HCC): ICD-10-CM

## 2023-02-28 DIAGNOSIS — I10 PRIMARY HYPERTENSION: ICD-10-CM

## 2023-02-28 DIAGNOSIS — G04.90 ENCEPHALITIS AND ENCEPHALOMYELITIS: Primary | ICD-10-CM

## 2023-02-28 PROCEDURE — 99306 1ST NF CARE HIGH MDM 50: CPT | Performed by: INTERNAL MEDICINE

## 2023-03-07 ENCOUNTER — OUTSIDE SERVICES (OUTPATIENT)
Dept: PRIMARY CARE CLINIC | Age: 58
End: 2023-03-07

## 2023-03-07 DIAGNOSIS — G04.90 ENCEPHALITIS AND ENCEPHALOMYELITIS: Primary | ICD-10-CM

## 2023-03-07 DIAGNOSIS — E66.2 MORBID (SEVERE) OBESITY WITH ALVEOLAR HYPOVENTILATION (HCC): ICD-10-CM

## 2023-03-07 DIAGNOSIS — E78.2 MIXED HYPERLIPIDEMIA: ICD-10-CM

## 2023-03-07 DIAGNOSIS — I10 PRIMARY HYPERTENSION: ICD-10-CM

## 2023-03-07 DIAGNOSIS — E11.9 TYPE 2 DIABETES MELLITUS WITHOUT COMPLICATION, UNSPECIFIED WHETHER LONG TERM INSULIN USE (HCC): ICD-10-CM

## 2023-03-07 DIAGNOSIS — J44.9 CHRONIC OBSTRUCTIVE PULMONARY DISEASE, UNSPECIFIED COPD TYPE (HCC): ICD-10-CM

## 2023-03-07 DIAGNOSIS — J96.02 ACUTE RESPIRATORY FAILURE WITH HYPERCAPNIA (HCC): ICD-10-CM

## 2023-03-07 DIAGNOSIS — G35 MULTIPLE SCLEROSIS (HCC): ICD-10-CM

## 2023-03-13 ENCOUNTER — OUTSIDE SERVICES (OUTPATIENT)
Dept: PRIMARY CARE CLINIC | Age: 58
End: 2023-03-13
Payer: MEDICARE

## 2023-03-13 DIAGNOSIS — I10 PRIMARY HYPERTENSION: ICD-10-CM

## 2023-03-13 DIAGNOSIS — E78.2 MIXED HYPERLIPIDEMIA: ICD-10-CM

## 2023-03-13 DIAGNOSIS — E66.2 MORBID (SEVERE) OBESITY WITH ALVEOLAR HYPOVENTILATION (HCC): ICD-10-CM

## 2023-03-13 DIAGNOSIS — E11.9 TYPE 2 DIABETES MELLITUS WITHOUT COMPLICATION, UNSPECIFIED WHETHER LONG TERM INSULIN USE (HCC): ICD-10-CM

## 2023-03-13 DIAGNOSIS — J44.9 CHRONIC OBSTRUCTIVE PULMONARY DISEASE, UNSPECIFIED COPD TYPE (HCC): ICD-10-CM

## 2023-03-13 DIAGNOSIS — G04.90 ENCEPHALITIS AND ENCEPHALOMYELITIS: Primary | ICD-10-CM

## 2023-03-13 DIAGNOSIS — J96.02 ACUTE RESPIRATORY FAILURE WITH HYPERCAPNIA (HCC): ICD-10-CM

## 2023-03-13 DIAGNOSIS — G35 MULTIPLE SCLEROSIS (HCC): ICD-10-CM

## 2023-03-13 PROCEDURE — 99309 SBSQ NF CARE MODERATE MDM 30: CPT | Performed by: INTERNAL MEDICINE

## 2023-03-20 ENCOUNTER — OUTSIDE SERVICES (OUTPATIENT)
Dept: PRIMARY CARE CLINIC | Age: 58
End: 2023-03-20
Payer: MEDICARE

## 2023-03-20 DIAGNOSIS — J96.02 ACUTE RESPIRATORY FAILURE WITH HYPERCAPNIA (HCC): ICD-10-CM

## 2023-03-20 DIAGNOSIS — G04.90 ENCEPHALITIS AND ENCEPHALOMYELITIS: Primary | ICD-10-CM

## 2023-03-20 DIAGNOSIS — J44.9 CHRONIC OBSTRUCTIVE PULMONARY DISEASE, UNSPECIFIED COPD TYPE (HCC): ICD-10-CM

## 2023-03-20 DIAGNOSIS — E78.2 MIXED HYPERLIPIDEMIA: ICD-10-CM

## 2023-03-20 DIAGNOSIS — E66.2 MORBID (SEVERE) OBESITY WITH ALVEOLAR HYPOVENTILATION (HCC): ICD-10-CM

## 2023-03-20 DIAGNOSIS — I10 PRIMARY HYPERTENSION: ICD-10-CM

## 2023-03-20 DIAGNOSIS — G35 MULTIPLE SCLEROSIS (HCC): ICD-10-CM

## 2023-03-20 DIAGNOSIS — E11.9 TYPE 2 DIABETES MELLITUS WITHOUT COMPLICATION, UNSPECIFIED WHETHER LONG TERM INSULIN USE (HCC): ICD-10-CM

## 2023-03-20 PROCEDURE — 99309 SBSQ NF CARE MODERATE MDM 30: CPT | Performed by: INTERNAL MEDICINE

## 2023-03-24 ASSESSMENT — COPD QUESTIONNAIRES: COPD: 1

## 2023-03-24 ASSESSMENT — VISUAL ACUITY: OU: 1

## 2023-03-24 NOTE — PROGRESS NOTES
Visit Date: 2/28/23  Linda Daley  1965  male 65 y.o. Subjective:    CC: Patient presents with altered mental status and copd. Patient presents for follow up of diabetes, htn,ms, and hyperlipidemia. HPI:  COPD  This is a chronic (he was admitted to the hospital for confusion and was found to have copd exacerbation with hypercapnia. he was treated and admitted to the nursing home for rehab) problem. The current episode started more than 1 year ago. The problem occurs constantly. The problem has been waxing and waning. His symptoms are aggravated by change in weather, climbing stairs and strenuous activity. Relieved by: taking medications, and needs to use cpap. He reports minimal improvement on treatment. His past medical history is significant for COPD. Extremity Weakness  This is a new problem. The current episode started in the past 7 days. The problem has been unchanged. Nothing aggravates the symptoms. Treatments tried: pt/ot eval. The treatment provided no relief. Diabetes  He presents for his follow-up diabetic visit. He has type 2 diabetes mellitus. His disease course has been stable. There are no hypoglycemic associated symptoms. There are no hypoglycemic complications. There are no diabetic complications. Risk factors for coronary artery disease include diabetes mellitus, dyslipidemia, hypertension, male sex and obesity. Current diabetic treatments: taking medications, no medication side effects. He is compliant with treatment most of the time. His weight is fluctuating minimally. He is following a generally healthy diet. His home blood glucose trend is fluctuating minimally. Hypertension  This is a chronic problem. The current episode started more than 1 year ago. The problem is unchanged. The problem is controlled. There are no associated agents to hypertension. Risk factors for coronary artery disease include diabetes mellitus, dyslipidemia, male gender and obesity.  Treatments tried:

## 2023-03-28 ENCOUNTER — OUTSIDE SERVICES (OUTPATIENT)
Dept: PRIMARY CARE CLINIC | Age: 58
End: 2023-03-28

## 2023-03-28 DIAGNOSIS — I10 PRIMARY HYPERTENSION: ICD-10-CM

## 2023-03-28 DIAGNOSIS — G35 MULTIPLE SCLEROSIS (HCC): ICD-10-CM

## 2023-03-28 DIAGNOSIS — E66.2 MORBID (SEVERE) OBESITY WITH ALVEOLAR HYPOVENTILATION (HCC): ICD-10-CM

## 2023-03-28 DIAGNOSIS — E11.9 TYPE 2 DIABETES MELLITUS WITHOUT COMPLICATION, UNSPECIFIED WHETHER LONG TERM INSULIN USE (HCC): ICD-10-CM

## 2023-03-28 DIAGNOSIS — J44.9 CHRONIC OBSTRUCTIVE PULMONARY DISEASE, UNSPECIFIED COPD TYPE (HCC): ICD-10-CM

## 2023-03-28 DIAGNOSIS — E78.2 MIXED HYPERLIPIDEMIA: ICD-10-CM

## 2023-03-28 DIAGNOSIS — J96.02 ACUTE RESPIRATORY FAILURE WITH HYPERCAPNIA (HCC): ICD-10-CM

## 2023-03-28 DIAGNOSIS — G04.90 ENCEPHALITIS AND ENCEPHALOMYELITIS: Primary | ICD-10-CM

## 2023-03-30 ASSESSMENT — COPD QUESTIONNAIRES: COPD: 1

## 2023-03-30 ASSESSMENT — VISUAL ACUITY: OU: 1

## 2023-03-30 NOTE — PROGRESS NOTES
upper leg: Normal.      Left upper leg: Normal.      Right lower leg: Normal.      Left lower leg: Normal.      Right foot: Normal.      Left foot: Normal.   Lymphadenopathy:      Comments: No palpable or visible regional lymphadenopathy   Skin:     General: Skin is warm and dry. Findings: No bruising, ecchymosis, lesion or rash. Neurological:      General: No focal deficit present. Mental Status: He is alert. Mental status is at baseline. Cranial Nerves: No cranial nerve deficit. Deep Tendon Reflexes: Reflexes normal.   Psychiatric:         Mood and Affect: Mood and affect normal. Mood is not depressed. Behavior: Behavior is not agitated. Behavior is cooperative. Cognition and Memory: Cognition is impaired. Comments: No apparent anxiety       Assessment & Plan:  1. Encephalitis and encephalomyelitis  2. Acute respiratory failure with hypercapnia (HCC)  3. Multiple sclerosis ([facility] [address]  4. Chronic obstructive pulmonary disease, unspecified COPD type ([facility] [address]  5. Type 2 diabetes mellitus without complication, unspecified whether long term insulin use ([facility] [address]  6. Primary hypertension  7. Mixed hyperlipidemia  8.  Morbid (severe) obesity with alveolar hypoventilation (HCC)     Chart reviewed   Medications reviewed   Continue therapy   Encourage cpap use   Monitor labs   Continue current treatment     IBasilio MA  am scribing for Dr. Kacy Garcia MA   IWilfredo MD, personally performed the services described in this documentation as scribed by Basilio Barraza and it is both accurate and complete

## 2023-04-03 ENCOUNTER — OUTSIDE SERVICES (OUTPATIENT)
Dept: PRIMARY CARE CLINIC | Age: 58
End: 2023-04-03
Payer: MEDICARE

## 2023-04-03 DIAGNOSIS — E66.2 MORBID (SEVERE) OBESITY WITH ALVEOLAR HYPOVENTILATION (HCC): ICD-10-CM

## 2023-04-03 DIAGNOSIS — E11.9 TYPE 2 DIABETES MELLITUS WITHOUT COMPLICATION, UNSPECIFIED WHETHER LONG TERM INSULIN USE (HCC): ICD-10-CM

## 2023-04-03 DIAGNOSIS — E78.2 MIXED HYPERLIPIDEMIA: ICD-10-CM

## 2023-04-03 DIAGNOSIS — J96.02 ACUTE RESPIRATORY FAILURE WITH HYPERCAPNIA (HCC): ICD-10-CM

## 2023-04-03 DIAGNOSIS — I10 PRIMARY HYPERTENSION: ICD-10-CM

## 2023-04-03 DIAGNOSIS — G04.90 ENCEPHALITIS AND ENCEPHALOMYELITIS: Primary | ICD-10-CM

## 2023-04-03 DIAGNOSIS — G47.33 OSA (OBSTRUCTIVE SLEEP APNEA): ICD-10-CM

## 2023-04-03 DIAGNOSIS — J44.9 CHRONIC OBSTRUCTIVE PULMONARY DISEASE, UNSPECIFIED COPD TYPE (HCC): ICD-10-CM

## 2023-04-03 DIAGNOSIS — G35 MULTIPLE SCLEROSIS (HCC): ICD-10-CM

## 2023-04-03 PROCEDURE — 99309 SBSQ NF CARE MODERATE MDM 30: CPT | Performed by: INTERNAL MEDICINE

## 2023-04-11 ASSESSMENT — VISUAL ACUITY: OU: 1

## 2023-04-11 ASSESSMENT — COPD QUESTIONNAIRES: COPD: 1

## 2023-04-24 ASSESSMENT — VISUAL ACUITY: OU: 1

## 2023-04-24 ASSESSMENT — COPD QUESTIONNAIRES: COPD: 1

## 2023-05-02 ASSESSMENT — VISUAL ACUITY: OU: 1

## 2023-05-02 ASSESSMENT — COPD QUESTIONNAIRES: COPD: 1

## 2023-05-15 ASSESSMENT — COPD QUESTIONNAIRES: COPD: 1

## 2023-05-15 ASSESSMENT — VISUAL ACUITY: OU: 1

## 2024-03-20 ENCOUNTER — APPOINTMENT (OUTPATIENT)
Dept: GENERAL RADIOLOGY | Age: 59
End: 2024-03-20
Payer: MEDICARE

## 2024-03-20 ENCOUNTER — HOSPITAL ENCOUNTER (EMERGENCY)
Age: 59
Discharge: HOME | End: 2024-03-20
Attending: EMERGENCY MEDICINE
Payer: MEDICARE

## 2024-03-20 VITALS
HEIGHT: 70 IN | WEIGHT: 315 LBS | OXYGEN SATURATION: 98 % | BODY MASS INDEX: 45.1 KG/M2 | DIASTOLIC BLOOD PRESSURE: 93 MMHG | SYSTOLIC BLOOD PRESSURE: 132 MMHG | TEMPERATURE: 98.8 F | HEART RATE: 85 BPM | RESPIRATION RATE: 20 BRPM

## 2024-03-20 DIAGNOSIS — S63.502A SPRAIN OF LEFT WRIST, INITIAL ENCOUNTER: Primary | ICD-10-CM

## 2024-03-20 PROCEDURE — 73110 X-RAY EXAM OF WRIST: CPT

## 2024-03-20 PROCEDURE — 6370000000 HC RX 637 (ALT 250 FOR IP): Performed by: EMERGENCY MEDICINE

## 2024-03-20 PROCEDURE — 99283 EMERGENCY DEPT VISIT LOW MDM: CPT

## 2024-03-20 RX ORDER — NAPROXEN 500 MG/1
500 TABLET ORAL 2 TIMES DAILY PRN
Qty: 60 TABLET | Refills: 0 | Status: SHIPPED | OUTPATIENT
Start: 2024-03-20

## 2024-03-20 RX ORDER — HYDROCODONE BITARTRATE AND ACETAMINOPHEN 5; 325 MG/1; MG/1
2 TABLET ORAL ONCE
Status: COMPLETED | OUTPATIENT
Start: 2024-03-20 | End: 2024-03-20

## 2024-03-20 RX ADMIN — HYDROCODONE BITARTRATE AND ACETAMINOPHEN 2 TABLET: 5; 325 TABLET ORAL at 09:28

## 2024-03-20 ASSESSMENT — LIFESTYLE VARIABLES
HOW MANY STANDARD DRINKS CONTAINING ALCOHOL DO YOU HAVE ON A TYPICAL DAY: PATIENT DOES NOT DRINK
HOW OFTEN DO YOU HAVE A DRINK CONTAINING ALCOHOL: NEVER

## 2024-03-20 ASSESSMENT — PAIN DESCRIPTION - ORIENTATION: ORIENTATION: LEFT

## 2024-03-20 ASSESSMENT — PAIN DESCRIPTION - ONSET: ONSET: ON-GOING

## 2024-03-20 ASSESSMENT — PAIN SCALES - GENERAL: PAINLEVEL_OUTOF10: 10

## 2024-03-20 ASSESSMENT — PAIN DESCRIPTION - FREQUENCY: FREQUENCY: CONTINUOUS

## 2024-03-20 ASSESSMENT — PAIN DESCRIPTION - PAIN TYPE: TYPE: ACUTE PAIN

## 2024-03-20 ASSESSMENT — PAIN - FUNCTIONAL ASSESSMENT: PAIN_FUNCTIONAL_ASSESSMENT: 0-10

## 2024-03-20 ASSESSMENT — PAIN DESCRIPTION - LOCATION: LOCATION: WRIST

## 2024-03-20 NOTE — ED PROVIDER NOTES
HPI:  3/20/24,   Time: 9:25 AM EDT       Calderon Tao is a 58 y.o. male presenting to the ED for fall/left wrist pain, beginning 3 days ago.  The complaint has been persistent, moderate in severity, and worsened by movement of affected ext.  Brought in by private vehicle.  Mechanical fall.  Landed left wrist.  Pain to top of left wrist.  No deformity.  No numbness or tingling.  Did not hit head.  No other extremity pain.  No chest pain.  No abdominal pain.    Review of Systems:   Pertinent positives and negatives are stated within HPI, all other systems reviewed and are negative.          --------------------------------------------- PAST HISTORY ---------------------------------------------  Past Medical History:  has a past medical history of Dermatophytosis, Hyperlipidemia, Hypertension, Leg pain, Leg swelling, Lymphedema, Lymphedema of both lower extremities, MS (multiple sclerosis) (HCC), Multiple sclerosis (HCC), Multiple sclerosis (HCC), Spinal stenosis, lumbar, Thyroid disease, and Tinea pedis of both feet.    Past Surgical History:  has a past surgical history that includes hernia repair and Tonsillectomy.    Social History:  reports that he has been smoking cigarettes. He has never used smokeless tobacco. He reports current alcohol use. He reports that he does not currently use drugs after having used the following drugs: Marijuana (Weed).    Family History: family history is not on file.     The patient’s home medications have been reviewed.    Allergies: Bactrim [sulfamethoxazole-trimethoprim], Penicillins, and Sulfa antibiotics        ---------------------------------------------------PHYSICAL EXAM--------------------------------------    Constitutional/General: Alert and oriented x3, chroincally ill appearing  Head: Normocephalic and atraumatic  Eyes: PERRL, EOMI, conjunctive normal, sclera non icteric  Mouth: Oropharynx clear, handling secretions,   Neck: Supple, full ROM,   Respiratory:  Not in